# Patient Record
Sex: FEMALE | Race: BLACK OR AFRICAN AMERICAN | NOT HISPANIC OR LATINO | URBAN - METROPOLITAN AREA
[De-identification: names, ages, dates, MRNs, and addresses within clinical notes are randomized per-mention and may not be internally consistent; named-entity substitution may affect disease eponyms.]

---

## 2018-08-27 ENCOUNTER — INPATIENT (INPATIENT)
Facility: HOSPITAL | Age: 43
LOS: 1 days | Discharge: HOME | DRG: 552 | End: 2018-08-29
Attending: HOSPITALIST | Admitting: HOSPITALIST
Payer: COMMERCIAL

## 2018-08-27 VITALS
HEIGHT: 61 IN | OXYGEN SATURATION: 98 % | HEART RATE: 97 BPM | SYSTOLIC BLOOD PRESSURE: 118 MMHG | WEIGHT: 190.04 LBS | TEMPERATURE: 98 F | DIASTOLIC BLOOD PRESSURE: 88 MMHG | RESPIRATION RATE: 22 BRPM

## 2018-08-27 DIAGNOSIS — Z98.89 OTHER SPECIFIED POSTPROCEDURAL STATES: Chronic | ICD-10-CM

## 2018-08-27 LAB
BASOPHILS # BLD AUTO: 0.1 K/UL — SIGNIFICANT CHANGE UP (ref 0–0.2)
BASOPHILS NFR BLD AUTO: 0.7 % — SIGNIFICANT CHANGE UP (ref 0–2)
EOSINOPHIL # BLD AUTO: 0 K/UL — SIGNIFICANT CHANGE UP (ref 0–0.5)
EOSINOPHIL NFR BLD AUTO: 0.6 % — SIGNIFICANT CHANGE UP (ref 0–6)
HCT VFR BLD CALC: 40 % — SIGNIFICANT CHANGE UP (ref 34.5–45)
HGB BLD-MCNC: 13 G/DL — SIGNIFICANT CHANGE UP (ref 11.5–15.5)
LYMPHOCYTES # BLD AUTO: 1.9 K/UL — SIGNIFICANT CHANGE UP (ref 1–3.3)
LYMPHOCYTES # BLD AUTO: 26.5 % — SIGNIFICANT CHANGE UP (ref 13–44)
MCHC RBC-ENTMCNC: 28.3 PG — SIGNIFICANT CHANGE UP (ref 27–34)
MCHC RBC-ENTMCNC: 32.4 GM/DL — SIGNIFICANT CHANGE UP (ref 32–36)
MCV RBC AUTO: 87.5 FL — SIGNIFICANT CHANGE UP (ref 80–100)
MONOCYTES # BLD AUTO: 0.3 K/UL — SIGNIFICANT CHANGE UP (ref 0–0.9)
MONOCYTES NFR BLD AUTO: 3.6 % — SIGNIFICANT CHANGE UP (ref 2–14)
NEUTROPHILS # BLD AUTO: 5 K/UL — SIGNIFICANT CHANGE UP (ref 1.8–7.4)
NEUTROPHILS NFR BLD AUTO: 68.5 % — SIGNIFICANT CHANGE UP (ref 43–77)
PLATELET # BLD AUTO: 382 K/UL — SIGNIFICANT CHANGE UP (ref 150–400)
RBC # BLD: 4.58 M/UL — SIGNIFICANT CHANGE UP (ref 3.8–5.2)
RBC # FLD: 16.1 % — HIGH (ref 10.3–14.5)
WBC # BLD: 7.3 K/UL — SIGNIFICANT CHANGE UP (ref 3.8–10.5)
WBC # FLD AUTO: 7.3 K/UL — SIGNIFICANT CHANGE UP (ref 3.8–10.5)

## 2018-08-27 PROCEDURE — 99285 EMERGENCY DEPT VISIT HI MDM: CPT

## 2018-08-27 RX ORDER — OXYCODONE HYDROCHLORIDE 5 MG/1
5 TABLET ORAL ONCE
Qty: 0 | Refills: 0 | Status: DISCONTINUED | OUTPATIENT
Start: 2018-08-27 | End: 2018-08-27

## 2018-08-27 RX ORDER — ACETAMINOPHEN 500 MG
975 TABLET ORAL ONCE
Qty: 0 | Refills: 0 | Status: COMPLETED | OUTPATIENT
Start: 2018-08-27 | End: 2018-08-27

## 2018-08-27 RX ADMIN — Medication 975 MILLIGRAM(S): at 23:15

## 2018-08-27 RX ADMIN — OXYCODONE HYDROCHLORIDE 5 MILLIGRAM(S): 5 TABLET ORAL at 23:15

## 2018-08-27 NOTE — ED ADULT NURSE NOTE - PMH
Anemia    Ectopic pregnancy  2, both ruptured  Fibromyalgia    GI bleed    Herniated disc, cervical  c6-c7  Joint effusion    Lumbar disc herniation  L4-L5, BULGING DISC AT L5-S1  Lupus    Ovarian cyst rupture    Polyarthralgia

## 2018-08-27 NOTE — ED ADULT TRIAGE NOTE - CHIEF COMPLAINT QUOTE
neck pain s/p ACDF on june 18 at [facility], now with neck pain, taking lyrica/percocet without relief, has L-S fusion pending in oct at [facility]

## 2018-08-27 NOTE — ED ADULT NURSE NOTE - CHPI ED NUR SYMPTOMS NEG
no vomiting/no weakness/no numbness/no change in level of consciousness/no loss of consciousness/no fever/no blurred vision/no confusion

## 2018-08-27 NOTE — ED PROVIDER NOTE - ATTENDING CONTRIBUTION TO CARE
Private Physician Jacinto Mccarthy [facility]   42y female pmh acdf JUNE 2018, Planning LS Spinal fusion 10/2018. PT comes to ed complains of Neck and low back pain, Neck pain had improved since surg, not improving , low back pain past year able to ambulate with pain. Pain meds not sufficent. PE WDWN female awake alert normocephalic atraumatic neck in collar, cv no rmg abd soft neuro gcs 15 speech fluent power lue 4/5 rle 5/5 pain light touch intact moving lwer extr  Ra Richey MD, Facep

## 2018-08-27 NOTE — ED PROVIDER NOTE - PROGRESS NOTE DETAILS
neurosx paged regarding consult asks to be called once CT results   Carito Kim PA-C Endorsed to Dr Daniel Richey MD, Facep CT w/o acute pathology. spine rec appreciated. patient with persistent pain and unable to ambulate will admit for further management Dino Sanchez DO: receive pt from dr gutierrez. pending CT. CT w/o acute pathology. spine sx rec appreciated. patient with persistent pain and unable to ambulate will admit for further management

## 2018-08-27 NOTE — ED PROVIDER NOTE - OBJECTIVE STATEMENT
42 year old female w/ PMHx ACDF sx w/ Dr Macagno [facility] 6/2018 w/ plan for L/S fusion 10/2018 presents co worsened neck pain. Patient states pain was controlled w/ percocet at home after surgery. For the past 3 days patient has had worsened pain, states she was supposed to follow up w/ her surgeon day of onset of pain but was unable to secondary to intensity. States she has intermittently been wearing her c-collar as advised. Denies recurrent trauma, falls. Patient endorses continued b/l hand numbness unchanged since prior surgery. She is able to ambulate w/ some difficulty. Denies fevers, chills, chest pain, sob, headaches, dizziness, abdominal pain, n/v, saddle anesthesia, bowl and bladder retention/incontinence

## 2018-08-27 NOTE — ED ADULT NURSE NOTE - CHIEF COMPLAINT QUOTE
neck pain s/p ACDF on june 18 at [facility], now with neck pain, taking lyrica/percocet without relief, has L-S fusion pending in oct at [facility], +ccollar in place

## 2018-08-27 NOTE — ED ADULT NURSE NOTE - OBJECTIVE STATEMENT
Pt wear hard c-collar presents for eval of continued low back pain for which she is scheduled to have surgery next week.  She is ambulatory without assistance.   Denies urinary or bowel incontinence.  No loss of sensation lower extremities.

## 2018-08-27 NOTE — ED ADULT NURSE NOTE - PSH
Complication following molar or ectopic pregnancy  bilateral salpingectomy  H/O abdominoplasty    H/O gastric bypass  June 2009  Internal hernia  s/p repair - 2011

## 2018-08-27 NOTE — ED PROVIDER NOTE - PHYSICAL EXAMINATION
CONSTITUTIONAL: Patient is awake, alert and oriented x 3. Patient is well appearing and in no acute distress.  HEAD: NCAT,   EYES: PERRL b/l, EOMI,   ENT: Airway patent, Nasal mucosa clear. Mouth with normal mucosa. Throat has no vesicles, no oropharyngeal exudates and uvula is midline.  LUNGS: CTA B/L,  HEART: RRR.+S1S2 no murmurs,   ABDOMEN: Soft nd/nt+bs no rebound or guarding.   EXTREMITY: no edema or calf tenderness b/l, FROM upper and lower ext b/l. Patient in c-collar + midline cervical ttp and midline lumbar ttp and left pumbar paraspinal ttp. No midline thoracic ttp  SKIN: with no rash or lesions.   NEURO: CN 3-12 grossly intact. Normal gross sensation upper and lower extremities b/l.  strength 4/5 b/l UE otherwise 5/5, strength of the RLE 5/5 LLE 4/5.

## 2018-08-28 DIAGNOSIS — R73.9 HYPERGLYCEMIA, UNSPECIFIED: ICD-10-CM

## 2018-08-28 DIAGNOSIS — M50.20 OTHER CERVICAL DISC DISPLACEMENT, UNSPECIFIED CERVICAL REGION: ICD-10-CM

## 2018-08-28 DIAGNOSIS — M51.26 OTHER INTERVERTEBRAL DISC DISPLACEMENT, LUMBAR REGION: ICD-10-CM

## 2018-08-28 DIAGNOSIS — M79.7 FIBROMYALGIA: ICD-10-CM

## 2018-08-28 DIAGNOSIS — R52 PAIN, UNSPECIFIED: ICD-10-CM

## 2018-08-28 LAB
ALBUMIN SERPL ELPH-MCNC: 4.1 G/DL — SIGNIFICANT CHANGE UP (ref 3.3–5)
ALBUMIN SERPL ELPH-MCNC: 4.5 G/DL — SIGNIFICANT CHANGE UP (ref 3.3–5)
ALP SERPL-CCNC: 120 U/L — SIGNIFICANT CHANGE UP (ref 40–120)
ALP SERPL-CCNC: 130 U/L — HIGH (ref 40–120)
ALT FLD-CCNC: 27 U/L — SIGNIFICANT CHANGE UP (ref 10–45)
ALT FLD-CCNC: 29 U/L — SIGNIFICANT CHANGE UP (ref 10–45)
ANION GAP SERPL CALC-SCNC: 18 MMOL/L — HIGH (ref 5–17)
ANION GAP SERPL CALC-SCNC: 20 MMOL/L — HIGH (ref 5–17)
AST SERPL-CCNC: 40 U/L — SIGNIFICANT CHANGE UP (ref 10–40)
AST SERPL-CCNC: 55 U/L — HIGH (ref 10–40)
BILIRUB SERPL-MCNC: 0.5 MG/DL — SIGNIFICANT CHANGE UP (ref 0.2–1.2)
BILIRUB SERPL-MCNC: 0.7 MG/DL — SIGNIFICANT CHANGE UP (ref 0.2–1.2)
BUN SERPL-MCNC: 10 MG/DL — SIGNIFICANT CHANGE UP (ref 7–23)
BUN SERPL-MCNC: 9 MG/DL — SIGNIFICANT CHANGE UP (ref 7–23)
CALCIUM SERPL-MCNC: 9 MG/DL — SIGNIFICANT CHANGE UP (ref 8.4–10.5)
CALCIUM SERPL-MCNC: 9.5 MG/DL — SIGNIFICANT CHANGE UP (ref 8.4–10.5)
CHLORIDE SERPL-SCNC: 94 MMOL/L — LOW (ref 96–108)
CHLORIDE SERPL-SCNC: 96 MMOL/L — SIGNIFICANT CHANGE UP (ref 96–108)
CO2 SERPL-SCNC: 22 MMOL/L — SIGNIFICANT CHANGE UP (ref 22–31)
CO2 SERPL-SCNC: 23 MMOL/L — SIGNIFICANT CHANGE UP (ref 22–31)
CREAT SERPL-MCNC: 0.55 MG/DL — SIGNIFICANT CHANGE UP (ref 0.5–1.3)
CREAT SERPL-MCNC: 0.61 MG/DL — SIGNIFICANT CHANGE UP (ref 0.5–1.3)
GLUCOSE SERPL-MCNC: 132 MG/DL — HIGH (ref 70–99)
GLUCOSE SERPL-MCNC: 143 MG/DL — HIGH (ref 70–99)
HCG SERPL-ACNC: <2 MIU/ML — SIGNIFICANT CHANGE UP
HCT VFR BLD CALC: 39.3 % — SIGNIFICANT CHANGE UP (ref 34.5–45)
HGB BLD-MCNC: 12.8 G/DL — SIGNIFICANT CHANGE UP (ref 11.5–15.5)
MCHC RBC-ENTMCNC: 28 PG — SIGNIFICANT CHANGE UP (ref 27–34)
MCHC RBC-ENTMCNC: 32.6 GM/DL — SIGNIFICANT CHANGE UP (ref 32–36)
MCV RBC AUTO: 86 FL — SIGNIFICANT CHANGE UP (ref 80–100)
PLATELET # BLD AUTO: 389 K/UL — SIGNIFICANT CHANGE UP (ref 150–400)
POTASSIUM SERPL-MCNC: 3.9 MMOL/L — SIGNIFICANT CHANGE UP (ref 3.5–5.3)
POTASSIUM SERPL-MCNC: 5.4 MMOL/L — HIGH (ref 3.5–5.3)
POTASSIUM SERPL-SCNC: 3.9 MMOL/L — SIGNIFICANT CHANGE UP (ref 3.5–5.3)
POTASSIUM SERPL-SCNC: 5.4 MMOL/L — HIGH (ref 3.5–5.3)
PROT SERPL-MCNC: 8.5 G/DL — HIGH (ref 6–8.3)
PROT SERPL-MCNC: 8.7 G/DL — HIGH (ref 6–8.3)
RBC # BLD: 4.57 M/UL — SIGNIFICANT CHANGE UP (ref 3.8–5.2)
RBC # FLD: 16.3 % — HIGH (ref 10.3–14.5)
SODIUM SERPL-SCNC: 136 MMOL/L — SIGNIFICANT CHANGE UP (ref 135–145)
SODIUM SERPL-SCNC: 137 MMOL/L — SIGNIFICANT CHANGE UP (ref 135–145)
WBC # BLD: 8.96 K/UL — SIGNIFICANT CHANGE UP (ref 3.8–10.5)
WBC # FLD AUTO: 8.96 K/UL — SIGNIFICANT CHANGE UP (ref 3.8–10.5)

## 2018-08-28 PROCEDURE — 72125 CT NECK SPINE W/O DYE: CPT | Mod: 26

## 2018-08-28 PROCEDURE — 72131 CT LUMBAR SPINE W/O DYE: CPT | Mod: 26

## 2018-08-28 PROCEDURE — 99255 IP/OBS CONSLTJ NEW/EST HI 80: CPT

## 2018-08-28 PROCEDURE — 99223 1ST HOSP IP/OBS HIGH 75: CPT | Mod: AI

## 2018-08-28 RX ORDER — ONDANSETRON 8 MG/1
4 TABLET, FILM COATED ORAL ONCE
Qty: 0 | Refills: 0 | Status: COMPLETED | OUTPATIENT
Start: 2018-08-28 | End: 2018-08-28

## 2018-08-28 RX ORDER — ACETAMINOPHEN 500 MG
650 TABLET ORAL EVERY 6 HOURS
Qty: 0 | Refills: 0 | Status: DISCONTINUED | OUTPATIENT
Start: 2018-08-28 | End: 2018-08-29

## 2018-08-28 RX ORDER — OXYCODONE HYDROCHLORIDE 5 MG/1
5 TABLET ORAL ONCE
Qty: 0 | Refills: 0 | Status: DISCONTINUED | OUTPATIENT
Start: 2018-08-28 | End: 2018-08-28

## 2018-08-28 RX ORDER — MORPHINE SULFATE 50 MG/1
2 CAPSULE, EXTENDED RELEASE ORAL ONCE
Qty: 0 | Refills: 0 | Status: DISCONTINUED | OUTPATIENT
Start: 2018-08-28 | End: 2018-08-28

## 2018-08-28 RX ORDER — DIAZEPAM 5 MG
5 TABLET ORAL ONCE
Qty: 0 | Refills: 0 | Status: DISCONTINUED | OUTPATIENT
Start: 2018-08-28 | End: 2018-08-28

## 2018-08-28 RX ORDER — MORPHINE SULFATE 50 MG/1
4 CAPSULE, EXTENDED RELEASE ORAL EVERY 4 HOURS
Qty: 0 | Refills: 0 | Status: DISCONTINUED | OUTPATIENT
Start: 2018-08-28 | End: 2018-08-29

## 2018-08-28 RX ORDER — ENOXAPARIN SODIUM 100 MG/ML
40 INJECTION SUBCUTANEOUS EVERY 24 HOURS
Qty: 0 | Refills: 0 | Status: DISCONTINUED | OUTPATIENT
Start: 2018-08-28 | End: 2018-08-29

## 2018-08-28 RX ORDER — ONDANSETRON 8 MG/1
4 TABLET, FILM COATED ORAL EVERY 6 HOURS
Qty: 0 | Refills: 0 | Status: DISCONTINUED | OUTPATIENT
Start: 2018-08-28 | End: 2018-08-29

## 2018-08-28 RX ORDER — MORPHINE SULFATE 50 MG/1
4 CAPSULE, EXTENDED RELEASE ORAL ONCE
Qty: 0 | Refills: 0 | Status: DISCONTINUED | OUTPATIENT
Start: 2018-08-28 | End: 2018-08-28

## 2018-08-28 RX ORDER — CYCLOBENZAPRINE HYDROCHLORIDE 10 MG/1
5 TABLET, FILM COATED ORAL THREE TIMES A DAY
Qty: 0 | Refills: 0 | Status: DISCONTINUED | OUTPATIENT
Start: 2018-08-28 | End: 2018-08-29

## 2018-08-28 RX ORDER — OXYCODONE HYDROCHLORIDE 5 MG/1
10 TABLET ORAL EVERY 6 HOURS
Qty: 0 | Refills: 0 | Status: DISCONTINUED | OUTPATIENT
Start: 2018-08-28 | End: 2018-08-29

## 2018-08-28 RX ADMIN — Medication 5 MILLIGRAM(S): at 04:14

## 2018-08-28 RX ADMIN — OXYCODONE HYDROCHLORIDE 5 MILLIGRAM(S): 5 TABLET ORAL at 03:15

## 2018-08-28 RX ADMIN — MORPHINE SULFATE 4 MILLIGRAM(S): 50 CAPSULE, EXTENDED RELEASE ORAL at 05:35

## 2018-08-28 RX ADMIN — OXYCODONE HYDROCHLORIDE 10 MILLIGRAM(S): 5 TABLET ORAL at 14:11

## 2018-08-28 RX ADMIN — MORPHINE SULFATE 2 MILLIGRAM(S): 50 CAPSULE, EXTENDED RELEASE ORAL at 10:19

## 2018-08-28 RX ADMIN — ONDANSETRON 4 MILLIGRAM(S): 8 TABLET, FILM COATED ORAL at 14:11

## 2018-08-28 RX ADMIN — ONDANSETRON 4 MILLIGRAM(S): 8 TABLET, FILM COATED ORAL at 18:39

## 2018-08-28 RX ADMIN — ONDANSETRON 4 MILLIGRAM(S): 8 TABLET, FILM COATED ORAL at 10:19

## 2018-08-28 RX ADMIN — OXYCODONE HYDROCHLORIDE 10 MILLIGRAM(S): 5 TABLET ORAL at 15:17

## 2018-08-28 RX ADMIN — OXYCODONE HYDROCHLORIDE 5 MILLIGRAM(S): 5 TABLET ORAL at 02:11

## 2018-08-28 RX ADMIN — OXYCODONE HYDROCHLORIDE 10 MILLIGRAM(S): 5 TABLET ORAL at 21:33

## 2018-08-28 RX ADMIN — OXYCODONE HYDROCHLORIDE 10 MILLIGRAM(S): 5 TABLET ORAL at 21:03

## 2018-08-28 RX ADMIN — ENOXAPARIN SODIUM 40 MILLIGRAM(S): 100 INJECTION SUBCUTANEOUS at 14:11

## 2018-08-28 RX ADMIN — CYCLOBENZAPRINE HYDROCHLORIDE 5 MILLIGRAM(S): 10 TABLET, FILM COATED ORAL at 14:22

## 2018-08-28 RX ADMIN — ONDANSETRON 4 MILLIGRAM(S): 8 TABLET, FILM COATED ORAL at 05:35

## 2018-08-28 RX ADMIN — ONDANSETRON 4 MILLIGRAM(S): 8 TABLET, FILM COATED ORAL at 07:18

## 2018-08-28 RX ADMIN — MORPHINE SULFATE 2 MILLIGRAM(S): 50 CAPSULE, EXTENDED RELEASE ORAL at 10:38

## 2018-08-28 RX ADMIN — Medication 975 MILLIGRAM(S): at 03:15

## 2018-08-28 RX ADMIN — MORPHINE SULFATE 4 MILLIGRAM(S): 50 CAPSULE, EXTENDED RELEASE ORAL at 06:35

## 2018-08-28 NOTE — H&P ADULT - PROBLEM SELECTOR PLAN 2
-plan for L/S fusion 10/2018 at [facility]  -pain control as above   -PT   -f/up with outpatient ortho spine

## 2018-08-28 NOTE — CONSULT NOTE ADULT - ASSESSMENT
42F h/o herniated lumbar and cervical discs s/p C4/6 acdf in 6/2018 with persistent pain post operatively, no new weakness / numbness or other neurological complaints. Patient diffusely weak on exam 2/2 effort and pain however no focal deficit. Patient HERNANDEZ antigravity with only break away weakness 2/2 pain. CT C and L spine no acute fracture hardware in good place.  - No acute neurosurgical intervention  - Pain control  - Would recommend patient follow up with her surgeon as outpatient

## 2018-08-28 NOTE — CONSULT NOTE ADULT - SUBJECTIVE AND OBJECTIVE BOX
p ([id number])     HPI: 42 year old female w/ PMHx ACDF sx w/ Dr Macagno [facility] 6/2018 w/ plan for L/S fusion 10/2018 presents co worsened neck pain. Patient states pain was controlled w/ percocet at home after surgery. For the past 3 days patient has had worsened pain, states she was supposed to follow up w/ her surgeon day of onset of pain but was unable to secondary to intensity. States she has intermittently been wearing her c-collar as advised for comfort. Denies recurrent trauma, falls. Patient endorses continued b/l hand numbness/parethesias unchanged since prior surgery. She is able to ambulate w/ some difficulty. Denies fevers, chills, chest pain, sob, headaches, dizziness, abdominal pain, n/v, saddle anesthesia, bowl and bladder retention/incontinence. Patient denies any new weakness / numbness. Patient only complains of continued pain which is at her baseline since prior the operation. Patient states she has not had improvement in symptoms after the surgery    PAST MEDICAL HISTORY   Lupus  Anemia  GI bleed  Joint effusion  Herniated disc, cervical  Lumbar disc herniation  Polyarthralgia  Fibromyalgia  Ectopic pregnancy  Ovarian cyst rupture  No pertinent past medical history    PAST SURGICAL HISTORY   H/O abdominoplasty  Internal hernia  Complication following molar or ectopic pregnancy  H/O gastric bypass    IV Contrast (Hives)  Seafood (Hives)      MEDICATIONS:  Antibiotics:    Neuro:    Anticoagulation:    Other:      SOCIAL HISTORY:   Occupation:   Marital Status:     FAMILY HISTORY:  No pertinent family history in first degree relatives      REVIEW OF SYSTEMS:  Check here if all are normal other than Neurological []  General:  Eyes:  ENT:  Cardiac:  Respiratory:  GI:  Musculoskeletal:   Skin:  Neurologic:   Psychiatric:     PHYSICAL EXAMINATION:   T(C): 36.8 (08-28-18 @ 01:50), Max: 36.8 (08-28-18 @ 01:50)  HR: 88 (08-28-18 @ 01:50) (88 - 97)  BP: 127/84 (08-28-18 @ 01:50) (118/88 - 127/84)  RR: 21 (08-28-18 @ 01:50) (21 - 22)  SpO2: 100% (08-28-18 @ 01:50) (98% - 100%)  Wt(kg): --Height (cm): 154.94 (08-27 @ 21:44)  Weight (kg): 86.2 (08-27 @ 21:44)    General Examination:     Neurologic Examination:           PHYSICAL EXAM:    Constitutional: No Acute Distress     Neurological: AOx3, Following Commands    Motor exam:          Upper extremity                         Delt     Bicep     Tricep    HG                                                 R         4/5        4/5        4/5       4/5                                               L          4/5        4/5        4/5       4/5          Lower extremity                        HF         KF        KE       DF         PF                                                  R        4/5       4/5        4/5     4/5         4/5                                               L         4/5        4/5      4/5       4/5       4/5    Patient exam limited to pain and effort                                             Sensation: [x] intact to light touch  [] decreased:     No clonus, no hoffmans, no babinski    Incision: CDI   LABS:                        13.0   7.3   )-----------( 382      ( 27 Aug 2018 23:43 )             40.0     08-27    136  |  96  |  10  ----------------------------<  143<H>  5.4<H>   |  22  |  0.55    Ca    9.0      27 Aug 2018 23:43    TPro  8.5<H>  /  Alb  4.1  /  TBili  0.5  /  DBili  x   /  AST  55<H>  /  ALT  29  /  AlkPhos  120  08-27          RADIOLOGY & ADDITIONAL STUDIES:  IMPRESSION:     Central disc protrusions at L4-L5 and L5-S1 causing mild spinal canal   stenosis, better assessed with MRI.     Nonspecific fluid and stranding in the midline subcutaneous fat at the   L1-L3 level; correlate for recent intervention.                MARIA ALEJANDRA DANIELLE M.D., RADIOLOGY RESIDENT  This document has been electronically signed.  NAZIA MESSINA M.D., ATTENDING RADIOLOGIST  This document has been electronically signed. Aug 28 2018  1:49AM      IMPRESSION:    No evidence for acute displaced fracture or traumatic malalignment.   Patient is status post anterior cervical discectomy and fusion of C4-C6.   The hardware appears intact.    If symptoms persist, correlate with neurologic evaluation to determine if   further evaluation with MRI is warranted, if there are no   contraindications.                MARIA ALEJANDAR DANIELLE M.D., RADIOLOGY RESIDENT  This document has been electronically signed.  NAZIA MESSINA M.D., ATTENDING RADIOLOGIST  This document has been electronically signed. Aug 28 2018  1:42AM

## 2018-08-28 NOTE — H&P ADULT - HISTORY OF PRESENT ILLNESS
42F h/o fibromyalgia, herniated lumbar and cervical discs s/p C4/6 ACDF in 6/2018 w/ plan for L/S fusion 10/2018, presents c/o worsened neck pain. Pt says neck pain had improved after surgery, but then has been progressively worsening over the past 3 weeks. Pain is localized to back of neck, radiates down left arm, sharp pain, 8 to 10 out of 10 on pain scale, constant, worse w/ movement, Percocet helps. Pain associated w/ bilateral hand numbness and tingling. Pt also w/ lower back pain that has been controlled w/ Percocet.

## 2018-08-28 NOTE — PHYSICAL THERAPY INITIAL EVALUATION ADULT - PRECAUTIONS/LIMITATIONS, REHAB EVAL
spinal precautions/RESULTS CONTINUED: CT cervical spine unremarkable. CT Lumbar spine: central disc protrusions at L4-L5 and L5-S1 causing mild spinal canal stenosis; nonspecific fluid and stranding in the midline subcutaneous fat at the L-L3 level.

## 2018-08-28 NOTE — H&P ADULT - [facility] PE MLT D E PC
normal (ped)... In no apparent distress, appears well developed and well nourished. regular rate and rhythm

## 2018-08-28 NOTE — PHYSICAL THERAPY INITIAL EVALUATION ADULT - PLANNED THERAPY INTERVENTIONS, PT EVAL
Pt currently demonstrates independence with all functional mobility. Pt to be placed on ambulation aide program to maintain independence and to encourage further ambulation/activity. Pt cleared fo discharge from a PT standpoint. Please reconsult as appropriate/if status changes. Pt currently demonstrates independence with all functional mobility. Pt to be placed on ambulation aide program to maintain independence and to encourage further ambulation/activity. Pt cleared for discharge from a PT standpoint. Please reconsult as appropriate/if status changes.

## 2018-08-28 NOTE — H&P ADULT - PROBLEM SELECTOR PLAN 1
-s/p C4-6 ACDF in 6/2018   -CT C-spine unremarkable   -pain control w/ Oxycodone and Morphine IV prn   -Flexeril prn muscle spasms   -c-collar for comfort   -PT   -neurosurgery input appreciated

## 2018-08-28 NOTE — H&P ADULT - ASSESSMENT
42F h/o fibromyalgia, herniated lumbar and cervical discs s/p C4/6 ACDF in 6/2018 w/ plan for L/S fusion 10/2018, presents c/o worsened neck pain.

## 2018-08-28 NOTE — H&P ADULT - NSHPPHYSICALEXAM_GEN_ALL_CORE
Vital Signs Last 24 Hrs  T(C): 36.7 (28 Aug 2018 08:00), Max: 36.9 (28 Aug 2018 05:31)  T(F): 98 (28 Aug 2018 08:00), Max: 98.5 (28 Aug 2018 05:31)  HR: 79 (28 Aug 2018 08:00) (79 - 97)  BP: 165/83 (28 Aug 2018 08:00) (118/88 - 165/83)  BP(mean): --  RR: 18 (28 Aug 2018 08:00) (18 - 22)  SpO2: 99% (28 Aug 2018 08:00) (98% - 100%)

## 2018-08-28 NOTE — PHYSICAL THERAPY INITIAL EVALUATION ADULT - PERTINENT HX OF CURRENT PROBLEM, REHAB EVAL
41 y/o female with h/o fibromyalgia, herniated lumbar and cervical discs s/p C4-C6 ACDF (6/2018) with plan for L/S fusion 10/2018. Pt p/w c/o neck pain post operatively. Pt says neck pain had improved after surgery, but then has been progressively worsening over the past 3 weeks.  Pain associated with bilateral hand numbness and tingling. Pt states she has intermittently been wearing her c-collar as advised for comfort. Pt also with lower back pain that has been controlled with Percocet

## 2018-08-28 NOTE — CONSULT NOTE ADULT - ATTENDING COMMENTS
Pt seen and examined  Agree with above  Will try and contact her Spine surgeon at [facility] and arrange for transfer at the pts request

## 2018-08-28 NOTE — H&P ADULT - NSHPLABSRESULTS_GEN_ALL_CORE
12.8   8.96  )-----------( 389      ( 28 Aug 2018 07:25 )             39.3   08-28    137  |  94<L>  |  9   ----------------------------<  132<H>  3.9   |  23  |  0.61    Ca    9.5      28 Aug 2018 06:10    TPro  8.7<H>  /  Alb  4.5  /  TBili  0.7  /  DBili  x   /  AST  40  /  ALT  27  /  AlkPhos  130<H>  08-28    CT C-spine:   No evidence for acute displaced fracture or traumatic malalignment.   Patient is status post anterior cervical discectomy and fusion of C4-C6.   The hardware appears intact.    CT L-spine:   Central disc protrusions at L4-L5 and L5-S1 causing mild spinal canal   stenosis, better assessed with MRI.   Nonspecific fluid and stranding in the midline subcutaneous fat at the   L1-L3 level; correlate for recent intervention.

## 2018-08-28 NOTE — PHYSICAL THERAPY INITIAL EVALUATION ADULT - GENERAL OBSERVATIONS, REHAB EVAL
Pt ayesha 35 min eval well. Pt agreed to session. Pt a/w c/o neck and back pain (planned for sx 10/2018). Pt initially rec'd in bed, premedicated, c-collar off. When PT returned pt rec'd standing in room independently with c-collar on.

## 2018-08-29 ENCOUNTER — TRANSCRIPTION ENCOUNTER (OUTPATIENT)
Age: 43
End: 2018-08-29

## 2018-08-29 VITALS
HEART RATE: 100 BPM | RESPIRATION RATE: 18 BRPM | OXYGEN SATURATION: 98 % | TEMPERATURE: 99 F | SYSTOLIC BLOOD PRESSURE: 137 MMHG | DIASTOLIC BLOOD PRESSURE: 92 MMHG

## 2018-08-29 LAB
ALBUMIN SERPL ELPH-MCNC: 4.4 G/DL — SIGNIFICANT CHANGE UP (ref 3.3–5)
ALP SERPL-CCNC: 126 U/L — HIGH (ref 40–120)
ALT FLD-CCNC: 19 U/L — SIGNIFICANT CHANGE UP (ref 10–45)
ANION GAP SERPL CALC-SCNC: 13 MMOL/L — SIGNIFICANT CHANGE UP (ref 5–17)
AST SERPL-CCNC: 29 U/L — SIGNIFICANT CHANGE UP (ref 10–40)
BILIRUB SERPL-MCNC: 0.7 MG/DL — SIGNIFICANT CHANGE UP (ref 0.2–1.2)
BUN SERPL-MCNC: 7 MG/DL — SIGNIFICANT CHANGE UP (ref 7–23)
CALCIUM SERPL-MCNC: 9.6 MG/DL — SIGNIFICANT CHANGE UP (ref 8.4–10.5)
CHLORIDE SERPL-SCNC: 97 MMOL/L — SIGNIFICANT CHANGE UP (ref 96–108)
CO2 SERPL-SCNC: 24 MMOL/L — SIGNIFICANT CHANGE UP (ref 22–31)
CREAT SERPL-MCNC: 0.71 MG/DL — SIGNIFICANT CHANGE UP (ref 0.5–1.3)
GLUCOSE SERPL-MCNC: 141 MG/DL — HIGH (ref 70–99)
POTASSIUM SERPL-MCNC: 3.6 MMOL/L — SIGNIFICANT CHANGE UP (ref 3.5–5.3)
POTASSIUM SERPL-SCNC: 3.6 MMOL/L — SIGNIFICANT CHANGE UP (ref 3.5–5.3)
PROT SERPL-MCNC: 8.1 G/DL — SIGNIFICANT CHANGE UP (ref 6–8.3)
SODIUM SERPL-SCNC: 134 MMOL/L — LOW (ref 135–145)

## 2018-08-29 PROCEDURE — 72125 CT NECK SPINE W/O DYE: CPT

## 2018-08-29 PROCEDURE — 83036 HEMOGLOBIN GLYCOSYLATED A1C: CPT

## 2018-08-29 PROCEDURE — G0378: CPT

## 2018-08-29 PROCEDURE — 80053 COMPREHEN METABOLIC PANEL: CPT

## 2018-08-29 PROCEDURE — 97161 PT EVAL LOW COMPLEX 20 MIN: CPT

## 2018-08-29 PROCEDURE — 99285 EMERGENCY DEPT VISIT HI MDM: CPT

## 2018-08-29 PROCEDURE — 84702 CHORIONIC GONADOTROPIN TEST: CPT

## 2018-08-29 PROCEDURE — 85027 COMPLETE CBC AUTOMATED: CPT

## 2018-08-29 PROCEDURE — 72131 CT LUMBAR SPINE W/O DYE: CPT

## 2018-08-29 RX ORDER — OXYCODONE AND ACETAMINOPHEN 5; 325 MG/1; MG/1
2 TABLET ORAL EVERY 6 HOURS
Qty: 0 | Refills: 0 | Status: DISCONTINUED | OUTPATIENT
Start: 2018-08-29 | End: 2018-08-29

## 2018-08-29 RX ORDER — CYCLOBENZAPRINE HYDROCHLORIDE 10 MG/1
0 TABLET, FILM COATED ORAL
Qty: 0 | Refills: 0 | COMMUNITY

## 2018-08-29 RX ADMIN — ONDANSETRON 4 MILLIGRAM(S): 8 TABLET, FILM COATED ORAL at 08:33

## 2018-08-29 RX ADMIN — OXYCODONE AND ACETAMINOPHEN 2 TABLET(S): 5; 325 TABLET ORAL at 13:00

## 2018-08-29 RX ADMIN — Medication 150 MILLIGRAM(S): at 15:36

## 2018-08-29 RX ADMIN — OXYCODONE HYDROCHLORIDE 10 MILLIGRAM(S): 5 TABLET ORAL at 04:13

## 2018-08-29 RX ADMIN — OXYCODONE HYDROCHLORIDE 10 MILLIGRAM(S): 5 TABLET ORAL at 04:43

## 2018-08-29 RX ADMIN — CYCLOBENZAPRINE HYDROCHLORIDE 5 MILLIGRAM(S): 10 TABLET, FILM COATED ORAL at 11:29

## 2018-08-29 RX ADMIN — ONDANSETRON 4 MILLIGRAM(S): 8 TABLET, FILM COATED ORAL at 02:24

## 2018-08-29 RX ADMIN — OXYCODONE AND ACETAMINOPHEN 2 TABLET(S): 5; 325 TABLET ORAL at 12:39

## 2018-08-29 RX ADMIN — ENOXAPARIN SODIUM 40 MILLIGRAM(S): 100 INJECTION SUBCUTANEOUS at 15:36

## 2018-08-29 NOTE — PROGRESS NOTE ADULT - ATTENDING COMMENTS
zofran for nausea, improved.  suspect single episode of diarrhea was from food vs. valium. Improved today. Afebrile w/ no leukocytosis, HD stable.  Patient eager for d/c home, will not give opioids on d/c. Can give 5 days lyrica until she's able to get refill from outside provider.  d/w neurosurgery, no contraindication to discharge w/ close outpatient f/u.  40 minutes spent coordinating discharge, see d/c sum for details.

## 2018-08-29 NOTE — PROGRESS NOTE ADULT - ASSESSMENT
42F h/o fibromyalgia, herniated lumbar and cervical discs s/p C4/6 ACDF in 6/2018 w/ plan for L/S fusion 10/2018, presents c/o worsened neck pain in setting of missing recent refill of chronic opioids.

## 2018-08-29 NOTE — DISCHARGE NOTE ADULT - PLAN OF CARE
achieve acceptable pain level s/p C4-6 ACDF in 6/2018   -CT C-spine unremarkable   pain control with percocet 10mg q6 (ISTOP [id number]).   Flexeril 5mg TID, home dose.   Lyrica 150 TID, home dose.   c-collar for comfort   Out-patient physical therapy   neurosurgery input appreciated, no acute interventions.  Lumbar disc herniation.  Plan: -plan for L/S fusion 10/2018 at [facility] Continue current medication regimen Neck and back pain 2/2 cervical and lumbar disc herniation   s/p C4-6 ACDF in 6/2018   -CT C-spine unremarkable   pain control with percocet 10mg q6 (ISTOP [id number]).   Flexeril 5mg TID, home dose.   Lyrica 150 TID, home dose.   c-collar for comfort   Out-patient physical therapy   neurosurgery input appreciated, no acute interventions.  Lumbar disc herniation-plan for L/S fusion 10/2018 at [facility]

## 2018-08-29 NOTE — DISCHARGE NOTE ADULT - ADDITIONAL INSTRUCTIONS
Please follow up with Orthopedic surgeon, Dr. Meza within 1 week of discharge   Call to make appointment, bring discharge paperwork to follow up visit

## 2018-08-29 NOTE — DISCHARGE NOTE ADULT - CARE PLAN
Principal Discharge DX:	Intractable pain  Goal:	achieve acceptable pain level  Assessment and plan of treatment:	s/p C4-6 ACDF in 6/2018   -CT C-spine unremarkable   pain control with percocet 10mg q6 (ISTOP [id number]).   Flexeril 5mg TID, home dose.   Lyrica 150 TID, home dose.   c-collar for comfort   Out-patient physical therapy   neurosurgery input appreciated, no acute interventions.  Lumbar disc herniation.  Plan: -plan for L/S fusion 10/2018 at [facility]  Secondary Diagnosis:	Fibromyalgia  Assessment and plan of treatment:	Continue current medication regimen Principal Discharge DX:	Intractable pain  Goal:	achieve acceptable pain level  Assessment and plan of treatment:	Neck and back pain 2/2 cervical and lumbar disc herniation   s/p C4-6 ACDF in 6/2018   -CT C-spine unremarkable   pain control with percocet 10mg q6 (ISTOP [id number]).   Flexeril 5mg TID, home dose.   Lyrica 150 TID, home dose.   c-collar for comfort   Out-patient physical therapy   neurosurgery input appreciated, no acute interventions.  Lumbar disc herniation-plan for L/S fusion 10/2018 at [facility]  Secondary Diagnosis:	Fibromyalgia  Assessment and plan of treatment:	Continue current medication regimen

## 2018-08-29 NOTE — PROGRESS NOTE ADULT - PROBLEM SELECTOR PLAN 1
-s/p C4-6 ACDF in 6/2018   -CT C-spine unremarkable   -pain control w/ Oxycodone and Morphine IV prn   -Flexeril prn muscle spasms   -c-collar for comfort   -PT   -neurosurgery input appreciated - acute pain 2/2 patient missing refill of opioids. Now feels no pains. No new neurologic symptoms.   - re-start home regimen as below.   -s/p C4-6 ACDF in 6/2018   -CT C-spine unremarkable   -pain control w/ home dose perc 10 q6 (ISTOP [id number]).   -Flexeril 5mg TID, home dose.   - lyrica 150 TID, home dose.   -c-collar for comfort   -PT   -neurosurgery input appreciated, no acute interventions.

## 2018-08-29 NOTE — DISCHARGE NOTE ADULT - MEDICATION SUMMARY - MEDICATIONS TO CHANGE
I will SWITCH the dose or number of times a day I take the medications listed below when I get home from the hospital:    Lyrica 150 mg oral capsule  -- 1 cap(s) by mouth 2 times a day

## 2018-08-29 NOTE — DISCHARGE NOTE ADULT - MEDICATION SUMMARY - MEDICATIONS TO TAKE
I will START or STAY ON the medications listed below when I get home from the hospital:    Rolling walker  -- Indication: For ambulation    Physical Therapy  -- Indication: For Intractable pain    Percocet 10/325 oral tablet  -- 1 tab(s) by mouth every 6 hours, As Needed  -- Indication: For Pain    pregabalin 150 mg oral capsule  -- 1 cap(s) by mouth every 8 hours MDD:3  -- Indication: For Fibromyalgia    cyclobenzaprine 5 mg oral tablet  -- 1 tab(s) by mouth 3 times a day, As needed, Muscle Spasm  -- Indication: For Pain    Multiple Vitamins oral tablet  -- 1 tab(s) by mouth once a day  -- Indication: For supplement

## 2018-08-29 NOTE — PROGRESS NOTE ADULT - SUBJECTIVE AND OBJECTIVE BOX
cc: acute on chronic pain.     Yesterday after food + valium she developed watery diarrhea x1. None since.  Overnight VSS, afebrile. Improved pains this AM. Still w/ mild nausea.     REVIEW OF SYSTEMS:  CONSTITUTIONAL: No fever, weight loss, +fatigue.   EYES: No eye pain, visual disturbances, or discharge  ENMT:  No difficulty hearing, tinnitus, vertigo; No sinus or throat pain  NECK: +chronic neck pain, wears brace.   RESPIRATORY: No cough, wheezing, chills or hemoptysis; No shortness of breath  CARDIOVASCULAR: No chest pain, palpitations, dizziness, or leg swelling  GASTROINTESTINAL: +mild abdominal discomfort, mild nausea, no vomiting or hematemesis; No diarrhea (resolved) or constipation. No melena or hematochezia.  GENITOURINARY: No dysuria, frequency, hematuria, or incontinence  NEUROLOGICAL: No headaches, memory loss, +chronic loss of strength (LUE), no numbness, or tremors  SKIN: No itching, burning, rashes, or lesions   ENDOCRINE: No heat or cold intolerance; No hair loss  MUSCULOSKELETAL: pains as above.   HEME/LYMPH: No easy bruising, or bleeding gums    T(C): 37 (08-29-18 @ 08:37), Max: 37.2 (08-29-18 @ 00:58)  HR: 90 (08-29-18 @ 08:37) (88 - 112)  BP: 130/87 (08-29-18 @ 08:37) (130/87 - 147/89)  RR: 18 (08-29-18 @ 08:37) (18 - 18)  SpO2: 95% (08-29-18 @ 08:37) (95% - 99%)  Wt(kg): --Vital Signs Last 24 Hrs  T(C): 37 (29 Aug 2018 08:37), Max: 37.2 (29 Aug 2018 00:58)  T(F): 98.6 (29 Aug 2018 08:37), Max: 98.9 (29 Aug 2018 00:58)  HR: 90 (29 Aug 2018 08:37) (88 - 112)  BP: 130/87 (29 Aug 2018 08:37) (130/87 - 147/89)  BP(mean): --  RR: 18 (29 Aug 2018 08:37) (18 - 18)  SpO2: 95% (29 Aug 2018 08:37) (95% - 99%)    PHYSICAL EXAM:  GENERAL: NAD, well-groomed. Wearing collar around neck.   HEAD:  Atraumatic, Normocephalic  EYES: EOMI, conjunctiva and sclera clear  NECK: Supple  NERVOUS SYSTEM:  Alert & Oriented X3, Good concentration; Motor Strength 5/5 lower extremities, diminished strength (b/l) in LUE. RUE w/ good (5/5) strength proximally and distally.   CHEST/LUNG: Clear to percussion bilaterally; No rales, rhonchi, wheezing  HEART: Regular rate and rhythm; 2/6 systolic murmur present  ABDOMEN: Soft, mildly tender to deep palpation w/ out guarding or rigidity, Nondistended; Bowel sounds present  EXTREMITIES:  2+ Peripheral Pulses, No clubbing, cyanosis, or edema  SKIN: birth kerry of darkened skin on L proximal leg.    Consultant(s) Notes Reviewed:  [x ] YES  [ ] NO    LABS:           no leukocytosis, BMP WNL w/ normal Cr Na 134, ALP mild elevated, rest of transaminases WNL.              12.8   8.96  )-----------( 389      ( 28 Aug 2018 07:25 )             39.3     08-29    134<L>  |  97  |  7   ----------------------------<  141<H>  3.6   |  24  |  0.71    Ca    9.6      29 Aug 2018 07:31    TPro  8.1  /  Alb  4.4  /  TBili  0.7  /  DBili  x   /  AST  29  /  ALT  19  /  AlkPhos  126<H>  08-29        CAPILLARY BLOOD GLUCOSE          RADIOLOGY & ADDITIONAL TESTS:  Imaging Personally Reviewed: yes   Care discussed w/ other providers: yes--  Consultant notes reviewed: yes    Imaging  CT c spine.   The patient is status post anterior cervical discectomy and fusion of   C4-C6. Hardware appears intact and aligned without periprosthetic lucency   to suggest hardware failure.    There is no evidence for acute fracture, subluxation, or prevertebral   widening.     The craniocervical junction is unremarkable. There is straightening of   the normal cervical lordosis.    Vertebral body height is maintained. Vertebral alignment is   maintained.The intervertebral disc spaces are preserved.     There is no severe bony impingement of the spinal canal or neural   foramina.    Visualized portions of the lungs are clear. The surrounding structures of   the neck are unremarkable.      IMPRESSION:    No evidence for acute displaced fracture or traumatic malalignment.   Patient is status post anterior cervical discectomy and fusion of C4-C6.   The hardware appears intact.      CT lumbar  The lumbar lordosis is maintained.    Vertebral body heights and alignment are maintained without compression   fracture or subluxation.    No severe bony impingement of the spinal canal or neural foramina. There   are central disc protrusions at L4-L5 and L5-S1 causing mild spinal canal   stenosis. The sacroiliac joints are intact and the visualized sacrum is   within normal limits.    The soft tissues are unremarkable. Partially imaged Tania-en-Y gastric   bypass. Nonspecific fluid and stranding in the midline subcutaneous fat   at the L1-L3 level.    IMPRESSION:     Central disc protrusions at L4-L5 and L5-S1 causing mild spinal canal   stenosis, better assessed with MRI.     Nonspecific fluid and stranding in the midline subcutaneous fat at the   L1-L3 level; correlate for recent intervention.

## 2018-08-29 NOTE — DISCHARGE NOTE ADULT - PATIENT PORTAL LINK FT
You can access the [facility] Patient Portal, offered by [facility], by registering with the following website: [email or web address]

## 2018-08-29 NOTE — PROGRESS NOTE ADULT - PROBLEM SELECTOR PLAN 2
-plan for L/S fusion 10/2018 at [facility]  -pain control as above   -PT   -f/up with outpatient ortho spine

## 2019-11-25 ENCOUNTER — INPATIENT (INPATIENT)
Facility: HOSPITAL | Age: 44
LOS: 17 days | Discharge: HOME | DRG: 394 | End: 2019-12-13
Attending: INTERNAL MEDICINE | Admitting: INTERNAL MEDICINE
Payer: COMMERCIAL

## 2019-11-25 VITALS
HEIGHT: 61 IN | WEIGHT: 229.94 LBS | OXYGEN SATURATION: 100 % | DIASTOLIC BLOOD PRESSURE: 88 MMHG | HEART RATE: 119 BPM | RESPIRATION RATE: 22 BRPM | SYSTOLIC BLOOD PRESSURE: 141 MMHG | TEMPERATURE: 98 F

## 2019-11-25 DIAGNOSIS — K92.2 GASTROINTESTINAL HEMORRHAGE, UNSPECIFIED: ICD-10-CM

## 2019-11-25 DIAGNOSIS — Z98.89 OTHER SPECIFIED POSTPROCEDURAL STATES: Chronic | ICD-10-CM

## 2019-11-25 DIAGNOSIS — M54.10 RADICULOPATHY, SITE UNSPECIFIED: ICD-10-CM

## 2019-11-25 DIAGNOSIS — E66.9 OBESITY, UNSPECIFIED: ICD-10-CM

## 2019-11-25 DIAGNOSIS — D50.0 IRON DEFICIENCY ANEMIA SECONDARY TO BLOOD LOSS (CHRONIC): ICD-10-CM

## 2019-11-25 DIAGNOSIS — M79.7 FIBROMYALGIA: ICD-10-CM

## 2019-11-25 LAB
ALBUMIN SERPL ELPH-MCNC: 4.8 G/DL — SIGNIFICANT CHANGE UP (ref 3.3–5)
ALP SERPL-CCNC: 107 U/L — SIGNIFICANT CHANGE UP (ref 40–120)
ALT FLD-CCNC: 25 U/L — SIGNIFICANT CHANGE UP (ref 10–45)
ANION GAP SERPL CALC-SCNC: 14 MMOL/L — SIGNIFICANT CHANGE UP (ref 5–17)
ANION GAP SERPL CALC-SCNC: 16 MMOL/L — SIGNIFICANT CHANGE UP (ref 5–17)
APTT BLD: 37 SEC — HIGH (ref 27.5–36.3)
AST SERPL-CCNC: 63 U/L — HIGH (ref 10–40)
BASE EXCESS BLDV CALC-SCNC: 0.4 MMOL/L — SIGNIFICANT CHANGE UP (ref -2–2)
BASOPHILS # BLD AUTO: 0.03 K/UL — SIGNIFICANT CHANGE UP (ref 0–0.2)
BASOPHILS NFR BLD AUTO: 0.4 % — SIGNIFICANT CHANGE UP (ref 0–2)
BILIRUB SERPL-MCNC: 0.5 MG/DL — SIGNIFICANT CHANGE UP (ref 0.2–1.2)
BLD GP AB SCN SERPL QL: NEGATIVE — SIGNIFICANT CHANGE UP
BUN SERPL-MCNC: 4 MG/DL — LOW (ref 7–23)
BUN SERPL-MCNC: 4 MG/DL — LOW (ref 7–23)
CA-I SERPL-SCNC: 1.16 MMOL/L — SIGNIFICANT CHANGE UP (ref 1.12–1.3)
CALCIUM SERPL-MCNC: 9.4 MG/DL — SIGNIFICANT CHANGE UP (ref 8.4–10.5)
CALCIUM SERPL-MCNC: 9.9 MG/DL — SIGNIFICANT CHANGE UP (ref 8.4–10.5)
CHLORIDE BLDV-SCNC: 105 MMOL/L — SIGNIFICANT CHANGE UP (ref 96–108)
CHLORIDE SERPL-SCNC: 102 MMOL/L — SIGNIFICANT CHANGE UP (ref 96–108)
CHLORIDE SERPL-SCNC: 98 MMOL/L — SIGNIFICANT CHANGE UP (ref 96–108)
CO2 BLDV-SCNC: 25 MMOL/L — SIGNIFICANT CHANGE UP (ref 22–30)
CO2 SERPL-SCNC: 20 MMOL/L — LOW (ref 22–31)
CO2 SERPL-SCNC: 22 MMOL/L — SIGNIFICANT CHANGE UP (ref 22–31)
CREAT SERPL-MCNC: 0.4 MG/DL — LOW (ref 0.5–1.3)
CREAT SERPL-MCNC: 0.48 MG/DL — LOW (ref 0.5–1.3)
EOSINOPHIL # BLD AUTO: 0.01 K/UL — SIGNIFICANT CHANGE UP (ref 0–0.5)
EOSINOPHIL NFR BLD AUTO: 0.1 % — SIGNIFICANT CHANGE UP (ref 0–6)
GAS PNL BLDV: 135 MMOL/L — SIGNIFICANT CHANGE UP (ref 135–145)
GAS PNL BLDV: SIGNIFICANT CHANGE UP
GLUCOSE BLDV-MCNC: 109 MG/DL — HIGH (ref 70–99)
GLUCOSE SERPL-MCNC: 113 MG/DL — HIGH (ref 70–99)
GLUCOSE SERPL-MCNC: 114 MG/DL — HIGH (ref 70–99)
HCG SERPL-ACNC: <2 MIU/ML — SIGNIFICANT CHANGE UP
HCO3 BLDV-SCNC: 24 MMOL/L — SIGNIFICANT CHANGE UP (ref 21–29)
HCT VFR BLD CALC: 28.2 % — LOW (ref 34.5–45)
HCT VFR BLD CALC: 32.4 % — LOW (ref 34.5–45)
HCT VFR BLDA CALC: 30 % — LOW (ref 39–50)
HGB BLD CALC-MCNC: 9.7 G/DL — LOW (ref 11.5–15.5)
HGB BLD-MCNC: 10 G/DL — LOW (ref 11.5–15.5)
HGB BLD-MCNC: 8.8 G/DL — LOW (ref 11.5–15.5)
IMM GRANULOCYTES NFR BLD AUTO: 0.3 % — SIGNIFICANT CHANGE UP (ref 0–1.5)
INR BLD: 1.06 RATIO — SIGNIFICANT CHANGE UP (ref 0.88–1.16)
LACTATE BLDV-MCNC: 1.5 MMOL/L — SIGNIFICANT CHANGE UP (ref 0.7–2)
LYMPHOCYTES # BLD AUTO: 1.66 K/UL — SIGNIFICANT CHANGE UP (ref 1–3.3)
LYMPHOCYTES # BLD AUTO: 23.2 % — SIGNIFICANT CHANGE UP (ref 13–44)
MCHC RBC-ENTMCNC: 24.9 PG — LOW (ref 27–34)
MCHC RBC-ENTMCNC: 25.3 PG — LOW (ref 27–34)
MCHC RBC-ENTMCNC: 30.9 GM/DL — LOW (ref 32–36)
MCHC RBC-ENTMCNC: 31.2 GM/DL — LOW (ref 32–36)
MCV RBC AUTO: 80.6 FL — SIGNIFICANT CHANGE UP (ref 80–100)
MCV RBC AUTO: 81 FL — SIGNIFICANT CHANGE UP (ref 80–100)
MONOCYTES # BLD AUTO: 0.35 K/UL — SIGNIFICANT CHANGE UP (ref 0–0.9)
MONOCYTES NFR BLD AUTO: 4.9 % — SIGNIFICANT CHANGE UP (ref 2–14)
NEUTROPHILS # BLD AUTO: 5.07 K/UL — SIGNIFICANT CHANGE UP (ref 1.8–7.4)
NEUTROPHILS NFR BLD AUTO: 71.1 % — SIGNIFICANT CHANGE UP (ref 43–77)
NRBC # BLD: 0 /100 WBCS — SIGNIFICANT CHANGE UP (ref 0–0)
NRBC # BLD: 0 /100 WBCS — SIGNIFICANT CHANGE UP (ref 0–0)
OB PNL STL: NEGATIVE — SIGNIFICANT CHANGE UP
PCO2 BLDV: 35 MMHG — SIGNIFICANT CHANGE UP (ref 35–50)
PH BLDV: 7.44 — SIGNIFICANT CHANGE UP (ref 7.35–7.45)
PLATELET # BLD AUTO: 323 K/UL — SIGNIFICANT CHANGE UP (ref 150–400)
PLATELET # BLD AUTO: 358 K/UL — SIGNIFICANT CHANGE UP (ref 150–400)
PO2 BLDV: 45 MMHG — SIGNIFICANT CHANGE UP (ref 25–45)
POTASSIUM BLDV-SCNC: 3.2 MMOL/L — LOW (ref 3.5–5.3)
POTASSIUM SERPL-MCNC: 3.4 MMOL/L — LOW (ref 3.5–5.3)
POTASSIUM SERPL-MCNC: 6.1 MMOL/L — HIGH (ref 3.5–5.3)
POTASSIUM SERPL-SCNC: 3.4 MMOL/L — LOW (ref 3.5–5.3)
POTASSIUM SERPL-SCNC: 6.1 MMOL/L — HIGH (ref 3.5–5.3)
PROT SERPL-MCNC: 9.4 G/DL — HIGH (ref 6–8.3)
PROTHROM AB SERPL-ACNC: 12.2 SEC — SIGNIFICANT CHANGE UP (ref 10–12.9)
RAPID RVP RESULT: SIGNIFICANT CHANGE UP
RBC # BLD: 3.48 M/UL — LOW (ref 3.8–5.2)
RBC # BLD: 4.02 M/UL — SIGNIFICANT CHANGE UP (ref 3.8–5.2)
RBC # FLD: 18.9 % — HIGH (ref 10.3–14.5)
RBC # FLD: 19.4 % — HIGH (ref 10.3–14.5)
RH IG SCN BLD-IMP: POSITIVE — SIGNIFICANT CHANGE UP
SAO2 % BLDV: 78 % — SIGNIFICANT CHANGE UP (ref 67–88)
SODIUM SERPL-SCNC: 136 MMOL/L — SIGNIFICANT CHANGE UP (ref 135–145)
SODIUM SERPL-SCNC: 136 MMOL/L — SIGNIFICANT CHANGE UP (ref 135–145)
WBC # BLD: 6.54 K/UL — SIGNIFICANT CHANGE UP (ref 3.8–10.5)
WBC # BLD: 7.14 K/UL — SIGNIFICANT CHANGE UP (ref 3.8–10.5)
WBC # FLD AUTO: 6.54 K/UL — SIGNIFICANT CHANGE UP (ref 3.8–10.5)
WBC # FLD AUTO: 7.14 K/UL — SIGNIFICANT CHANGE UP (ref 3.8–10.5)

## 2019-11-25 PROCEDURE — 99232 SBSQ HOSP IP/OBS MODERATE 35: CPT

## 2019-11-25 PROCEDURE — 93010 ELECTROCARDIOGRAM REPORT: CPT

## 2019-11-25 PROCEDURE — 71046 X-RAY EXAM CHEST 2 VIEWS: CPT | Mod: 26

## 2019-11-25 PROCEDURE — 99284 EMERGENCY DEPT VISIT MOD MDM: CPT

## 2019-11-25 RX ORDER — POTASSIUM CHLORIDE 20 MEQ
40 PACKET (EA) ORAL ONCE
Refills: 0 | Status: COMPLETED | OUTPATIENT
Start: 2019-11-25 | End: 2019-11-25

## 2019-11-25 RX ORDER — OXYCODONE AND ACETAMINOPHEN 5; 325 MG/1; MG/1
2 TABLET ORAL EVERY 6 HOURS
Refills: 0 | Status: DISCONTINUED | OUTPATIENT
Start: 2019-11-25 | End: 2019-12-02

## 2019-11-25 RX ORDER — SODIUM CHLORIDE 9 MG/ML
1000 INJECTION, SOLUTION INTRAVENOUS
Refills: 0 | Status: DISCONTINUED | OUTPATIENT
Start: 2019-11-25 | End: 2019-11-26

## 2019-11-25 RX ORDER — PANTOPRAZOLE SODIUM 20 MG/1
40 TABLET, DELAYED RELEASE ORAL
Refills: 0 | Status: DISCONTINUED | OUTPATIENT
Start: 2019-11-25 | End: 2019-11-29

## 2019-11-25 RX ORDER — HYDROMORPHONE HYDROCHLORIDE 2 MG/ML
0.5 INJECTION INTRAMUSCULAR; INTRAVENOUS; SUBCUTANEOUS ONCE
Refills: 0 | Status: DISCONTINUED | OUTPATIENT
Start: 2019-11-25 | End: 2019-11-25

## 2019-11-25 RX ORDER — SODIUM CHLORIDE 9 MG/ML
1000 INJECTION, SOLUTION INTRAVENOUS ONCE
Refills: 0 | Status: COMPLETED | OUTPATIENT
Start: 2019-11-25 | End: 2019-11-25

## 2019-11-25 RX ADMIN — PANTOPRAZOLE SODIUM 40 MILLIGRAM(S): 20 TABLET, DELAYED RELEASE ORAL at 22:20

## 2019-11-25 RX ADMIN — Medication 150 MILLIGRAM(S): at 22:20

## 2019-11-25 RX ADMIN — Medication 40 MILLIEQUIVALENT(S): at 22:20

## 2019-11-25 RX ADMIN — SODIUM CHLORIDE 1000 MILLILITER(S): 9 INJECTION, SOLUTION INTRAVENOUS at 17:35

## 2019-11-25 RX ADMIN — OXYCODONE AND ACETAMINOPHEN 2 TABLET(S): 5; 325 TABLET ORAL at 22:20

## 2019-11-25 NOTE — H&P ADULT - NSICDXPASTSURGICALHX_GEN_ALL_CORE_FT
PAST SURGICAL HISTORY:  Complication following molar or ectopic pregnancy bilateral salpingectomy    H/O abdominoplasty     H/O gastric bypass June 2009    Internal hernia s/p repair - 2011

## 2019-11-25 NOTE — ED PROVIDER NOTE - PHYSICAL EXAMINATION
General:  NAD  HEENT: pupils equal and reactive, normal external ears bilaterally   Cardiac: RRR, no MRG appreciated  Resp: lungs clear to auscultation bilaterally, symmetric chest wall rise  Abd: soft, nontender, nondistended, normoactive bowel sounds  : no CVA tenderness  Neuro: Moving all extremities  Skin:  normal color for race  Rectal exam performed with Chaperone  RN Tameka: Dark stool, empty rectal vault, no hemorrhoids appreciated  Pelvic exam performed with Chaperone RN Tameka: Dark blood pooling in posterior fornix, os closed, no lacerations of vaginal side wall appreciated

## 2019-11-25 NOTE — ED PROVIDER NOTE - OBJECTIVE STATEMENT
42yo F pmhx of fibromyalgia, anemia, gastric bypass pw    BRBPR yesterday 1x episode, later in day followed by vaginally (Believes this is period, right time and has been "bleeding heavily with periods for months". Feeling weaker more sob with bleeding.  No hx of blood clot, no cardiac hx   Took Motrin in morning  Meds: Oxycodone, Lyrica  NDKA 44yo F pmhx of fibromyalgia, anemia, gastric bypass pw cc of BRBPR     BRBPR yesterday 1x episode, later in day followed by vaginally (Believes this is period, right time and has been "bleeding heavily with periods for months". Feeling weaker more sob with bleeding. Has had rectal bleeding past, found to be attributed due to ulcers at site of anastomosis.   No hx of blood clot, no cardiac hx   Took Motrin in morning  Meds: Oxycodone, Lyrica  NDKA

## 2019-11-25 NOTE — H&P ADULT - ASSESSMENT
44yo F pmhx of fibromyalgia, anemia, gastric bypass in 2006 at [place], she was admitted to [place] [facility] in 2015 with epigastric pain, found to have ulcer at the gastric staple line, managed with PPI, then discharged, presents here with one day of BRBPR. No history of constipation, she had hemorrhoids when she was pregnant but resolved, no surgeries for hemorrhoids, no chronic anal pain. She reports that after the BRBPR yesterday 1x episode, later in day she had a vaginal bleeding (Believes this is period, right time and has been "bleeding heavily with periods for 4 months". Feeling weaker more sob with bleeding. Does not take motrin, does not smoke.

## 2019-11-25 NOTE — ED ADULT NURSE NOTE - NSIMPLEMENTINTERV_GEN_ALL_ED
Implemented All Universal Safety Interventions:  [place] to call system. Call bell, personal items and telephone within reach. Instruct patient to call for assistance. Room bathroom lighting operational. Non-slip footwear when patient is off stretcher. Physically safe environment: no spills, clutter or unnecessary equipment. Stretcher in lowest position, wheels locked, appropriate side rails in place.

## 2019-11-25 NOTE — CONSULT NOTE ADULT - SUBJECTIVE AND OBJECTIVE BOX
[facility] Bariatric Surgery Consultation     Patient is a 43y old  Female who presents with a chief complaint of BRBPR    HPI:  44yo F pmhx of fibromyalgia, anemia, gastric bypass pw cc of BRBPR     	BRBPR yesterday 1x episode, later in day followed by vaginally (Believes this is period, right time and has been "bleeding heavily with periods for months". Feeling weaker more sob with bleeding. Has had rectal bleeding past, found to be attributed due to ulcers at site of anastomosis.     PAST MEDICAL & SURGICAL HISTORY:  Lupus  Anemia  GI bleed  Joint effusion  Herniated disc, cervical: c6-c7  Lumbar disc herniation: L4-L5, BULGING DISC AT L5-S1  Polyarthralgia  Fibromyalgia  Ectopic pregnancy: 2, both ruptured  Ovarian cyst rupture  H/O abdominoplasty  Internal hernia: s/p repair - 2011  Complication following molar or ectopic pregnancy: bilateral salpingectomy  H/O gastric bypass: June 2009      FAMILY HISTORY:  No pertinent family history in first degree relatives      SOCIAL HISTORY:    MEDICATIONS  (STANDING):    MEDICATIONS  (PRN):    Allergies    IV Contrast (Hives)  shellfish (Hives; Rash)    Intolerances        Vital Signs Last 24 Hrs  T(C): 37.9 (25 Nov 2019 17:24), Max: 37.9 (25 Nov 2019 17:24)  T(F): 100.3 (25 Nov 2019 17:24), Max: 100.3 (25 Nov 2019 17:24)  HR: 94 (25 Nov 2019 19:35) (86 - 119)  BP: 132/90 (25 Nov 2019 19:35) (132/90 - 141/88)  BP(mean): --  RR: 17 (25 Nov 2019 19:35) (16 - 22)  SpO2: 100% (25 Nov 2019 19:35) (100% - 100%)  Daily Height in cm: 154.94 (25 Nov 2019 16:24)    Daily                             10.0   7.14  )-----------( 358      ( 25 Nov 2019 17:57 )             32.4     11-25    136  |  98  |  4<L>  ----------------------------<  114<H>  6.1<H>   |  22  |  0.40<L>    Ca    9.9      25 Nov 2019 17:57    TPro  9.4<H>  /  Alb  4.8  /  TBili  0.5  /  DBili  x   /  AST  63<H>  /  ALT  25  /  AlkPhos  107  11-25    PT/INR - ( 25 Nov 2019 17:57 )   PT: 12.2 sec;   INR: 1.06 ratio         PTT - ( 25 Nov 2019 17:57 )  PTT:37.0 sec      Radiographic Findings:       Assessment: [facility] Bariatric Surgery Consultation     Patient is a 43y old  Female who presents with a chief complaint of BRBPR    HPI:  44yo F pmhx of fibromyalgia, anemia, gastric bypass in 2006 at [place], she was admitted to [place] surgery in 2015 with epigastric pain, found to have ulcer at the gastric staple line, managed with PPI, then discharged, presents here with one day of BRBPR. No history of constipation, she had hemorrhoids when she was pregnant but resolved, no surgeries for hemorrhoids, no chronic anal pain. She reports that after the BRBPR yesterday 1x episode, later in day she had a vaginal bleeding (Believes this is period, right time and has been "bleeding heavily with periods for 4 months". Feeling weaker more sob with bleeding. Does not take motrin, does not smoke.     PAST MEDICAL & SURGICAL HISTORY:  Lupus  Anemia  GI bleed  Joint effusion  Herniated disc, cervical: c6-c7  Lumbar disc herniation: L4-L5, BULGING DISC AT L5-S1  Polyarthralgia  Fibromyalgia  Ectopic pregnancy: 2, both ruptured  Ovarian cyst rupture  H/O abdominoplasty  Internal hernia: s/p repair - 2011  Complication following molar or ectopic pregnancy: bilateral salpingectomy  H/O gastric bypass: June 2009      FAMILY HISTORY:  No pertinent family history in first degree relatives      SOCIAL HISTORY:  not smoker, no alcohol     MEDICATIONS:  lyrica  oxycodone       Allergies    IV Contrast (Hives)  shellfish (Hives; Rash)    Intolerances        Vital Signs Last 24 Hrs  T(C): 37.9 (25 Nov 2019 17:24), Max: 37.9 (25 Nov 2019 17:24)  T(F): 100.3 (25 Nov 2019 17:24), Max: 100.3 (25 Nov 2019 17:24)  HR: 94 (25 Nov 2019 19:35) (86 - 119)  BP: 132/90 (25 Nov 2019 19:35) (132/90 - 141/88)  BP(mean): --  RR: 17 (25 Nov 2019 19:35) (16 - 22)  SpO2: 100% (25 Nov 2019 19:35) (100% - 100%)  Daily Height in cm: 154.94 (25 Nov 2019 16:24)      Gen: NAD  Resp: CTA b/l   CV: RRR  Abd: soft, ND, NTTP   LINH: no external hemorrhoids, no blood,                               10.0   7.14  )-----------( 358      ( 25 Nov 2019 17:57 )             32.4     11-25    136  |  98  |  4<L>  ----------------------------<  114<H>  6.1<H>   |  22  |  0.40<L>    Ca    9.9      25 Nov 2019 17:57    TPro  9.4<H>  /  Alb  4.8  /  TBili  0.5  /  DBili  x   /  AST  63<H>  /  ALT  25  /  AlkPhos  107  11-25    PT/INR - ( 25 Nov 2019 17:57 )   PT: 12.2 sec;   INR: 1.06 ratio         PTT - ( 25 Nov 2019 17:57 )  PTT:37.0 sec      Radiographic Findings:   CXR: clear lungs

## 2019-11-25 NOTE — H&P ADULT - HISTORY OF PRESENT ILLNESS
42yo F pmhx of fibromyalgia, anemia, gastric bypass in 2006 at [place], she was admitted to [place] surgery in 2015 with epigastric pain, found to have ulcer at the gastric staple line, managed with PPI, then discharged, presents here with one day of BRBPR. No history of constipation, she had hemorrhoids when she was pregnant but resolved, no surgeries for hemorrhoids, no chronic anal pain. She reports that after the BRBPR yesterday 1x episode, later in day she had a vaginal bleeding (Believes this is period, right time and has been "bleeding heavily with periods for 4 months". Feeling weaker more sob with bleeding. Does not take motrin, does not smoke.

## 2019-11-25 NOTE — ED ADULT TRIAGE NOTE - CHIEF COMPLAINT QUOTE
cough, back pain, vaginal bleed x 2 days, bright red blood per rectum x since yesterday (hx gastric bypass and gastric ulcer), chest pain x today, sob, chills, lower back pain, +hx anemia

## 2019-11-25 NOTE — ED PROVIDER NOTE - ATTENDING CONTRIBUTION TO CARE
Private Physician Franchesca nava  43y female pmh Anemiam, Ectopic Fibromyalgia,GI bleed, SLE,Ovarian cyst. SP gastric bypass. 2008/[facility].Pt comes to ed complains of cp,rectal bleed/brbpr, and vag bleeding yesterday with heavy bleeding. Used pads every 1.5 h w clots, 50cent size. Also complains of tactile fever and sweats. Cough past three weeks, with scant clear sputum, no hemoptysis, abd pain. PE WDWN female awake alert normocephalic atraumatic neck supple chest clear anterior & posterior abd mild gen ttp no rebound guarding. CV no rubs, gallops or murmurs, neruo no focal defects  Ra Richey MD, Facep

## 2019-11-25 NOTE — H&P ADULT - NSICDXPASTMEDICALHX_GEN_ALL_CORE_FT
PAST MEDICAL HISTORY:  Anemia     Ectopic pregnancy 2, both ruptured    Fibromyalgia     GI bleed     Herniated disc, cervical c6-c7    Joint effusion     Lumbar disc herniation L4-L5, BULGING DISC AT L5-S1    Lupus     Ovarian cyst rupture     Polyarthralgia

## 2019-11-25 NOTE — ED PROVIDER NOTE - NS ED ROS FT
CONSTITUTIONAL: +Fevers  Eyes: No vision changes  Cardiovascular: No Chest pain  Genitourinary: no dysuria, no hematuria  SKIN: no rashes.  NEURO: no headache,

## 2019-11-25 NOTE — H&P ADULT - PROBLEM SELECTOR PLAN 6
H/O Gastric Bypass and Bariatric surgery follow up noted. H/O Gastric Bypass and Bariatric surgery follow up noted.      Addendum  Patient with bright red blood per rectum, heavy menses with history of lakisha-en-Y gastric bypass. History of marginal ulcer 2015.  Vitals stable. Hgb 8.8.  Serial H&H. PPI. GI consult for EGD. Type & Cross.  Meaghan Melissa MD - MIS/bariatric fellow

## 2019-11-25 NOTE — ED PROVIDER NOTE - CLINICAL SUMMARY MEDICAL DECISION MAKING FREE TEXT BOX
Kvng PGY-2:  44yo F here with BRBPR, in context of previous rectal bleeding found to have been ulcers at site of bypass will get consult surgery, get labs. Also with rectal bleeding and now weakness, will likely admit for GI workup and if H/H low will transfuse as necessary

## 2019-11-25 NOTE — CONSULT NOTE ADULT - ASSESSMENT
Assessment: 42yo F pmhx of fibromyalgia, anemia, gastric bypass in 2006 at [place], she was admitted to [place] surgery in 2015 with epigastric pain, found to have ulcer at the gastric staple line, here with BRBPR yesterday , hemodynamically stable.     - recommend Protonix BID  - GI consultation for upper and lower endoscopy   - obtain type and screen   - Multivitamin IV solution (banana bag)    - discussed with Dr. Melissa Assessment: 44yo F pmhx of fibromyalgia, anemia, gastric bypass in 2006 at [place], she was admitted to [place] surgery in 2015 with epigastric pain, found to have ulcer at the gastric staple line, here with BRBPR yesterday , hemodynamically stable.     - recommend Protonix BID  - GI consultation for upper and lower endoscopy   - obtain type and screen   - Multivitamin IV solution (banana bag)    - discussed with Dr. Melissa       Addendum  Patient with bright red blood per rectum, heavy menses with history of lakisha-en-Y gastric bypass. History of marginal ulcer 2015.  Vitals stable. Hgb 8.8.  Serial H&H. PPI. GI consult for EGD. Type & screen. Banana bag.  Meaghan Melissa MD - MIS/bariatric fellow

## 2019-11-25 NOTE — ED ADULT NURSE NOTE - OBJECTIVE STATEMENT
43 yrs old female with h/o gastric bypass,  gastric ulcers lupus present to the ER for rectal and vaginal bleeding. As per pt she started to experience  bright red  rectal bleeding with her stool yesterday. Report 2 episodes of rectal bleeding with stool. Pt also stated that  her menstrual cycle started today. Pt also endorses coughing, chest tightness and SOB. Denies respiratory problems but endorses coughing x 3 weeks on and off. Pt also endorses fever and chills which started 2 days ago. Took Tylenols yesterday and took Ibuprofen today.

## 2019-11-26 LAB
ANION GAP SERPL CALC-SCNC: 16 MMOL/L — SIGNIFICANT CHANGE UP (ref 5–17)
APPEARANCE UR: CLEAR — SIGNIFICANT CHANGE UP
BILIRUB UR-MCNC: ABNORMAL
BUN SERPL-MCNC: 4 MG/DL — LOW (ref 7–23)
CALCIUM SERPL-MCNC: 8.9 MG/DL — SIGNIFICANT CHANGE UP (ref 8.4–10.5)
CHLORIDE SERPL-SCNC: 102 MMOL/L — SIGNIFICANT CHANGE UP (ref 96–108)
CK MB BLD-MCNC: 0.5 % — SIGNIFICANT CHANGE UP (ref 0–3.5)
CK MB BLD-MCNC: 0.5 % — SIGNIFICANT CHANGE UP (ref 0–3.5)
CK MB CFR SERPL CALC: 1.2 NG/ML — SIGNIFICANT CHANGE UP (ref 0–3.8)
CK MB CFR SERPL CALC: 1.5 NG/ML — SIGNIFICANT CHANGE UP (ref 0–3.8)
CK SERPL-CCNC: 232 U/L — HIGH (ref 25–170)
CK SERPL-CCNC: 318 U/L — HIGH (ref 25–170)
CO2 SERPL-SCNC: 23 MMOL/L — SIGNIFICANT CHANGE UP (ref 22–31)
COLOR SPEC: YELLOW — SIGNIFICANT CHANGE UP
CREAT SERPL-MCNC: 0.5 MG/DL — SIGNIFICANT CHANGE UP (ref 0.5–1.3)
DIFF PNL FLD: ABNORMAL
GLUCOSE SERPL-MCNC: 114 MG/DL — HIGH (ref 70–99)
GLUCOSE UR QL: NEGATIVE — SIGNIFICANT CHANGE UP
HCT VFR BLD CALC: 30.9 % — LOW (ref 34.5–45)
HGB BLD-MCNC: 9.5 G/DL — LOW (ref 11.5–15.5)
KETONES UR-MCNC: ABNORMAL
LEUKOCYTE ESTERASE UR-ACNC: ABNORMAL
MAGNESIUM SERPL-MCNC: 2.1 MG/DL — SIGNIFICANT CHANGE UP (ref 1.6–2.6)
MCHC RBC-ENTMCNC: 25.6 PG — LOW (ref 27–34)
MCHC RBC-ENTMCNC: 30.7 GM/DL — LOW (ref 32–36)
MCV RBC AUTO: 83.3 FL — SIGNIFICANT CHANGE UP (ref 80–100)
NITRITE UR-MCNC: NEGATIVE — SIGNIFICANT CHANGE UP
PH UR: 6.5 — SIGNIFICANT CHANGE UP (ref 5–8)
PHOSPHATE SERPL-MCNC: 3.3 MG/DL — SIGNIFICANT CHANGE UP (ref 2.5–4.5)
PLATELET # BLD AUTO: 287 K/UL — SIGNIFICANT CHANGE UP (ref 150–400)
POTASSIUM SERPL-MCNC: 3 MMOL/L — LOW (ref 3.5–5.3)
POTASSIUM SERPL-SCNC: 3 MMOL/L — LOW (ref 3.5–5.3)
PROT UR-MCNC: ABNORMAL
RBC # BLD: 3.71 M/UL — LOW (ref 3.8–5.2)
RBC # FLD: 18.8 % — HIGH (ref 10.3–14.5)
SODIUM SERPL-SCNC: 141 MMOL/L — SIGNIFICANT CHANGE UP (ref 135–145)
SP GR SPEC: 1.03 — HIGH (ref 1.01–1.02)
TROPONIN T, HIGH SENSITIVITY RESULT: 6 NG/L — SIGNIFICANT CHANGE UP (ref 0–51)
TROPONIN T, HIGH SENSITIVITY RESULT: 7 NG/L — SIGNIFICANT CHANGE UP (ref 0–51)
UROBILINOGEN FLD QL: ABNORMAL
WBC # BLD: 5.77 K/UL — SIGNIFICANT CHANGE UP (ref 3.8–10.5)
WBC # FLD AUTO: 5.77 K/UL — SIGNIFICANT CHANGE UP (ref 3.8–10.5)

## 2019-11-26 PROCEDURE — 99222 1ST HOSP IP/OBS MODERATE 55: CPT | Mod: GC

## 2019-11-26 RX ORDER — IPRATROPIUM/ALBUTEROL SULFATE 18-103MCG
3 AEROSOL WITH ADAPTER (GRAM) INHALATION ONCE
Refills: 0 | Status: COMPLETED | OUTPATIENT
Start: 2019-11-26 | End: 2019-11-26

## 2019-11-26 RX ORDER — HYDROMORPHONE HYDROCHLORIDE 2 MG/ML
0.5 INJECTION INTRAMUSCULAR; INTRAVENOUS; SUBCUTANEOUS ONCE
Refills: 0 | Status: DISCONTINUED | OUTPATIENT
Start: 2019-11-26 | End: 2019-11-26

## 2019-11-26 RX ORDER — POTASSIUM CHLORIDE 20 MEQ
40 PACKET (EA) ORAL EVERY 4 HOURS
Refills: 0 | Status: COMPLETED | OUTPATIENT
Start: 2019-11-26 | End: 2019-11-26

## 2019-11-26 RX ORDER — LIDOCAINE 4 G/100G
1 CREAM TOPICAL DAILY
Refills: 0 | Status: DISCONTINUED | OUTPATIENT
Start: 2019-11-26 | End: 2019-12-13

## 2019-11-26 RX ORDER — POTASSIUM CHLORIDE 20 MEQ
10 PACKET (EA) ORAL
Refills: 0 | Status: DISCONTINUED | OUTPATIENT
Start: 2019-11-26 | End: 2019-11-26

## 2019-11-26 RX ORDER — INFLUENZA VIRUS VACCINE 15; 15; 15; 15 UG/.5ML; UG/.5ML; UG/.5ML; UG/.5ML
0.5 SUSPENSION INTRAMUSCULAR ONCE
Refills: 0 | Status: COMPLETED | OUTPATIENT
Start: 2019-11-26 | End: 2019-12-13

## 2019-11-26 RX ORDER — SODIUM CHLORIDE 9 MG/ML
1000 INJECTION, SOLUTION INTRAVENOUS
Refills: 0 | Status: DISCONTINUED | OUTPATIENT
Start: 2019-11-26 | End: 2019-12-03

## 2019-11-26 RX ORDER — SOD SULF/SODIUM/NAHCO3/KCL/PEG
1000 SOLUTION, RECONSTITUTED, ORAL ORAL EVERY 4 HOURS
Refills: 0 | Status: COMPLETED | OUTPATIENT
Start: 2019-11-26 | End: 2019-11-26

## 2019-11-26 RX ADMIN — OXYCODONE AND ACETAMINOPHEN 2 TABLET(S): 5; 325 TABLET ORAL at 16:57

## 2019-11-26 RX ADMIN — PANTOPRAZOLE SODIUM 40 MILLIGRAM(S): 20 TABLET, DELAYED RELEASE ORAL at 05:48

## 2019-11-26 RX ADMIN — HYDROMORPHONE HYDROCHLORIDE 0.5 MILLIGRAM(S): 2 INJECTION INTRAMUSCULAR; INTRAVENOUS; SUBCUTANEOUS at 04:05

## 2019-11-26 RX ADMIN — LIDOCAINE 1 PATCH: 4 CREAM TOPICAL at 12:18

## 2019-11-26 RX ADMIN — OXYCODONE AND ACETAMINOPHEN 2 TABLET(S): 5; 325 TABLET ORAL at 23:28

## 2019-11-26 RX ADMIN — Medication 100 MILLIEQUIVALENT(S): at 12:17

## 2019-11-26 RX ADMIN — Medication 40 MILLIEQUIVALENT(S): at 16:57

## 2019-11-26 RX ADMIN — Medication 150 MILLIGRAM(S): at 13:38

## 2019-11-26 RX ADMIN — HYDROMORPHONE HYDROCHLORIDE 0.5 MILLIGRAM(S): 2 INJECTION INTRAMUSCULAR; INTRAVENOUS; SUBCUTANEOUS at 03:35

## 2019-11-26 RX ADMIN — Medication 40 MILLIEQUIVALENT(S): at 13:38

## 2019-11-26 RX ADMIN — OXYCODONE AND ACETAMINOPHEN 2 TABLET(S): 5; 325 TABLET ORAL at 09:41

## 2019-11-26 RX ADMIN — OXYCODONE AND ACETAMINOPHEN 2 TABLET(S): 5; 325 TABLET ORAL at 09:11

## 2019-11-26 RX ADMIN — Medication 3 MILLILITER(S): at 03:40

## 2019-11-26 RX ADMIN — SODIUM CHLORIDE 100 MILLILITER(S): 9 INJECTION, SOLUTION INTRAVENOUS at 03:20

## 2019-11-26 RX ADMIN — Medication 150 MILLIGRAM(S): at 05:49

## 2019-11-26 RX ADMIN — PANTOPRAZOLE SODIUM 40 MILLIGRAM(S): 20 TABLET, DELAYED RELEASE ORAL at 16:56

## 2019-11-26 RX ADMIN — SODIUM CHLORIDE 100 MILLILITER(S): 9 INJECTION, SOLUTION INTRAVENOUS at 12:18

## 2019-11-26 RX ADMIN — Medication 1000 MILLILITER(S): at 19:44

## 2019-11-26 RX ADMIN — Medication 1000 MILLILITER(S): at 21:23

## 2019-11-26 RX ADMIN — Medication 150 MILLIGRAM(S): at 21:23

## 2019-11-26 NOTE — PROGRESS NOTE ADULT - ASSESSMENT
Assessment: 42yo F pmhx of fibromyalgia, anemia, gastric bypass in 2006 at [place], she was admitted to [place] surgery in 2015 with epigastric pain, found to have ulcer at the gastric staple line, here with BRBPR yesterday , hemodynamically stable.     - recommend Protonix BID  - GI consultation for upper and lower endoscopy   - obtain type and screen   - Multivitamin IV solution (banana bag) Assessment: 44yo F pmhx of fibromyalgia, anemia, gastric bypass in 2006 at [place], she was admitted to [facility] in [facility] with epigastric pain, found to have ulcer at the gastric staple line, here with BRBPR yesterday , hemodynamically stable.     - recommend Protonix BID  - NPO  - GI consultation for upper and lower endoscopy   - obtain type and screen   - Multivitamin IV solution (banana bag)      [facility]  [id number]

## 2019-11-26 NOTE — CHART NOTE - NSCHARTNOTEFT_GEN_A_CORE
MEDICINE NP    PRIYANKA RADFORD  43y Female    Patient is a 43y old  Female who presents with a chief complaint of bleeding per rectum (25 Nov 2019 20:35)       > Event Summary:  Patient seen in ED RED returned to stretcher from bathroom privilege c/o dizziness, chest pain.  Also c/o lower back pain acute on chronic.   Chest pain 10/10, non-radiating, worsening with movements. Dizziness improving upon return to bed.   Denies sob, palpitations, acute bleed.    VS: 146/90 - 89 - 22  SpO2 98%  (25 Nov 2019 22:22)    >LABS:  Troponin T, High Sensitivity Result: 6: (11.25.19 @ 23:19)  CARDIAC MARKERS ( 25 Nov 2019 23:19 )  x     / x     / 318 U/L / x     / 1.5 ng/mL      > Physical Assessment:  General: AAOX3  CV: +S1S2, RRR.  No peripheral edema. Mild chest wall ttp.   Respiratory: Even, unlabored.  CTA B/L.    Abdomen:  +BS.  Soft, NT, ND.  No palpable mass  MSK: HERNANDEZ x4.   Skin: Warm and Dry         > Assessment & Plan:  - HPI: 44yo F pmhx of Fibromyalgia, Anemia, Gastric Bypass in 2006 at [place], she was admitted to [place] surgery in 2015 with epigastric pain, found to have ulcer at the Gastric staple line, managed with PPI, then discharged.  Presents here with one day of BRBPR.  Admitted with GIB, Anemia requiring transfusion.  Now with chest pain .    1) Chest Pain : ACS vs MSK given fibromyalgia +/- Symptomatic Anemia   -STAT CE   -f/u ECG  -Dilaudid 0.5mg IV  -Due for 1u PRBC now   -C/w close monitoring   -Can consider Cardiology if indicated    2) Dizziness post ambulation -now resolved  -Likely in setting of Anemia /GIB  -Bed Rest   -IVF  -Tx as ordered above    F/u with Attending in AM    LAVELL Adame-BC  Medicine Department  #[id number]      >>>ADDEND: (11-26-19 @ 03:00)  VS:  T(C): 37.1 T(F): 98.8  HR: 89  BP: 115/78 RR: 18 SpO2: 97% (11-26-19 @ 01:45)    -Follow-up with Patient, reports minimal improvement of chest pain  -CE with hsT neg. +elev  - more likely MSK  -Patient did not receive Dilaudid above, Percocet was given instead.  Will order Dilaudid and re-eval  -DuoNeb now        Aga Conti, LAVELL-BC  Medicine Department  #[id number]

## 2019-11-26 NOTE — CONSULT NOTE ADULT - SUBJECTIVE AND OBJECTIVE BOX
Chief Complaint:  bleeding / rectum     HPI:  42yo F pmhx of fibromyalgia, anemia, gastric bypass in  at [place], she was admitted to [place] surgery in  with epigastric pain, found to have ulcer at the gastric staple line, managed with PPI, then discharged, presents here with one day of BRBPR. No history of constipation, she had hemorrhoids when she was pregnant but resolved, no surgeries for hemorrhoids, no chronic anal pain. She reports that after the BRBPR yesterday 1x episode, later in day she had a vaginal bleeding (Believes this is period, right time and has been "bleeding heavily with periods for 4 months". Feeling weaker more sob with bleeding. Does not take motrin, does not smoke.     Last Colonoscopy: never    Last Endoscopy:                                                                 Impression:     - Normal esophagus. - Gastric bypass with a normal-sized pouch and intact staple line. - Two non-obstructing gastric ulcers with clean base at staple line, no  evidence or stigmata of bleeding. Gastric pouch biopsied for H pylori came back negative.     Allergies:  IV Contrast (Hives)  shellfish (Hives; Rash)    Home Medications:   * Patient Currently Takes Medications as of 29-Aug-2018 14:06 documented in Structured Notes  · 	Physical Therapy:   · 	Rolling walker:   · 	Multiple Vitamins oral tablet: 1 tab(s) orally once a day  · 	Percocet 10/325 oral tablet: 1 tab(s) orally every 6 hours, As Needed  · 	pregabalin 150 mg oral capsule: 1 cap(s) orally every 8 hours MDD:3  · 	cyclobenzaprine 5 mg oral tablet: 1 tab(s) orally 3 times a day, As needed, Muscle    Hospital Medications:  dextrose 5% + sodium chloride 0.9% 1000 milliLiter(s) IV Continuous <Continuous>  influenza   Vaccine 0.5 milliLiter(s) IntraMuscular once  oxycodone    5 mG/acetaminophen 325 mG 2 Tablet(s) Oral every 6 hours PRN  pantoprazole  Injectable 40 milliGRAM(s) IV Push two times a day  pregabalin 150 milliGRAM(s) Oral every 8 hours      PMHX/PSHX:  Lupus  Anemia  GI bleed  Joint effusion  Herniated disc, cervical  Lumbar disc herniation  Polyarthralgia  Fibromyalgia  Ectopic pregnancy  Ovarian cyst rupture  No pertinent past medical history  H/O abdominoplasty  Internal hernia  Complication following molar or ectopic pregnancy  H/O gastric bypass      Family history:  No family history of colon CA in first degree relatives      Social History: no smoking    ROS:   General:  No fevers, chills or night sweats.  ENT:  No sore throat or dysphagia  CV:  No pain or palpitations  Resp:  No dyspnea, cough, wheezing  GI:  as above  Skin:  No rash or edema      PHYSICAL EXAM:   GENERAL:  NAD, Appears stated age  HEENT:  NC/AT,  conjunctivae clear and pink, sclera -anicteric  CHEST:  CTA B/L, Normal effort  HEART:  RRR S1/S2, No murmurs  ABDOMEN:  Soft, non-tender, non-distended, normoactive bowel sounds,  no masses   EXTREMITIES:  No cyanosis or Edema  SKIN:  Warm & Dry. No rash or erythema  NEURO:  Alert, oriented  Rectal exam: no blood or melena     Vital Signs:  Vital Signs Last 24 Hrs  T(C): 37.1 (2019 01:45), Max: 37.9 (2019 17:24)  T(F): 98.8 (2019 01:45), Max: 100.3 (2019 17:24)  HR: 89 (2019 01:45) (86 - 119)  BP: 115/78 (2019 01:45) (115/78 - 146/90)  BP(mean): 104 (2019 20:35) (104 - 104)  RR: 18 (2019 01:45) (16 - 22)  SpO2: 97% (2019 01:45) (97% - 100%)  Daily Height in cm: 154.94 (2019 16:24)    Daily     LABS:                        8.8    6.54  )-----------( 323      ( 2019 19:49 )             28.2     Mean Cell Volume: 81.0 fl (19 @ 19:49)        141  |  102  |  4<L>  ----------------------------<  114<H>  3.0<L>   |  23  |  0.50    Ca    8.9      2019 06:39  Phos  3.3       Mg     2.1         TPro  9.4<H>  /  Alb  4.8  /  TBili  0.5  /  DBili  x   /  AST  63<H>  /  ALT  25  /  AlkPhos  107  11-25    LIVER FUNCTIONS - ( 2019 17:57 )  Alb: 4.8 g/dL / Pro: 9.4 g/dL / ALK PHOS: 107 U/L / ALT: 25 U/L / AST: 63 U/L / GGT: x           PT/INR - ( 2019 17:57 )   PT: 12.2 sec;   INR: 1.06 ratio         PTT - ( 2019 17:57 )  PTT:37.0 sec  Urinalysis Basic - ( 2019 00:24 )    Color: Yellow / Appearance: Clear / S.029 / pH: x  Gluc: x / Ketone: Large  / Bili: Small / Urobili: 4 mg/dL   Blood: x / Protein: 30 mg/dL / Nitrite: Negative   Leuk Esterase: Moderate / RBC: >50 /hpf / WBC 3-5   Sq Epi: x / Non Sq Epi: Moderate / Bacteria: Few                              8.8    6.54  )-----------( 323      ( 2019 19:49 )             28.2                         10.0   7.14  )-----------( 358      ( 2019 17:57 )             32.4     Imaging:

## 2019-11-26 NOTE — PROGRESS NOTE ADULT - ASSESSMENT
42yo F pmhx of fibromyalgia, anemia, gastric bypass in 2006 at [place], she was admitted to [place] surgery in 2015 with epigastric pain, found to have ulcer at the gastric staple line, managed with PPI, then discharged, presents here with one day of BRBPR. No history of constipation, she had hemorrhoids when she was pregnant but resolved, no surgeries for hemorrhoids, no chronic anal pain. She reports that after the BRBPR yesterday 1x episode, later in day she had a vaginal bleeding (Believes this is period, right time and has been "bleeding heavily with periods for 4 months". Feeling weaker more sob with bleeding. Does not take motrin, does not smoke.      Problem/Plan - 1:  ·  Problem: GIB (gastrointestinal bleeding).  Plan: Hemodynamically stable. GI and Colorectal help appreciated. EGD and Colonoscopy.      Problem/Plan - 2:  ·  Problem: Anemia due to blood loss.  Plan: Transfusing to keep Hgb>9 G .      Problem/Plan - 3:  ·  Problem:  Fever and chills.  Plan: Will check RVP ,Chest Xray noted  and cultures pending. No Leucocytosis .      Problem/Plan - 4:  ·  Problem: Back pain with radiculopathy.  Plan: H/O Surgeries and will f/u with her spinal surgeon.      Problem/Plan - 5:  ·  Problem: Fibromyalgia.  Plan: Home meds.      Problem/Plan - 6:  Problem: Obesity. Plan: H/O Gastric Bypass and Bariatric surgery follow up noted.

## 2019-11-26 NOTE — PROGRESS NOTE ADULT - SUBJECTIVE AND OBJECTIVE BOX
INTERVAL HPI/OVERNIGHT EVENTS: I feel better.   Vital Signs Last 24 Hrs  T(C): 36.9 (2019 18:36), Max: 37.9 (2019 20:35)  T(F): 98.4 (2019 18:36), Max: 100.3 (2019 20:35)  HR: 79 (2019 18:36) (77 - 95)  BP: 126/83 (2019 18:36) (115/78 - 146/90)  BP(mean): 104 (2019 20:35) (104 - 104)  RR: 18 (2019 18:36) (18 - 22)  SpO2: 99% (2019 18:36) (97% - 100%)  I&O's Summary    2019 07:01  -  2019 19:53  --------------------------------------------------------  IN: 120 mL / OUT: 0 mL / NET: 120 mL      MEDICATIONS  (STANDING):  dextrose 5% + sodium chloride 0.9% 1000 milliLiter(s) (100 mL/Hr) IV Continuous <Continuous>  influenza   Vaccine 0.5 milliLiter(s) IntraMuscular once  lidocaine   Patch 1 Patch Transdermal daily  pantoprazole  Injectable 40 milliGRAM(s) IV Push two times a day  polyethylene glycol/electrolyte Solution 1000 milliLiter(s) Oral every 4 hours  pregabalin 150 milliGRAM(s) Oral every 8 hours    MEDICATIONS  (PRN):  oxycodone    5 mG/acetaminophen 325 mG 2 Tablet(s) Oral every 6 hours PRN Moderate Pain (4 - 6)    LABS:                        9.5    5.77  )-----------( 287      ( 2019 09:17 )             30.9         141  |  102  |  4<L>  ----------------------------<  114<H>  3.0<L>   |  23  |  0.50    Ca    8.9      2019 06:39  Phos  3.3       Mg     2.1         TPro  9.4<H>  /  Alb  4.8  /  TBili  0.5  /  DBili  x   /  AST  63<H>  /  ALT  25  /  AlkPhos  107  11-25    PT/INR - ( 2019 17:57 )   PT: 12.2 sec;   INR: 1.06 ratio         PTT - ( 2019 17:57 )  PTT:37.0 sec  Urinalysis Basic - ( 2019 00:24 )    Color: Yellow / Appearance: Clear / S.029 / pH: x  Gluc: x / Ketone: Large  / Bili: Small / Urobili: 4 mg/dL   Blood: x / Protein: 30 mg/dL / Nitrite: Negative   Leuk Esterase: Moderate / RBC: >50 /hpf / WBC 3-5   Sq Epi: x / Non Sq Epi: Moderate / Bacteria: Few      CAPILLARY BLOOD GLUCOSE            Urinalysis Basic - ( 2019 00:24 )    Color: Yellow / Appearance: Clear / S.029 / pH: x  Gluc: x / Ketone: Large  / Bili: Small / Urobili: 4 mg/dL   Blood: x / Protein: 30 mg/dL / Nitrite: Negative   Leuk Esterase: Moderate / RBC: >50 /hpf / WBC 3-5   Sq Epi: x / Non Sq Epi: Moderate / Bacteria: Few      REVIEW OF SYSTEMS:  CONSTITUTIONAL: No fever, weight loss, or fatigue  EYES: No eye pain, visual disturbances, or discharge  ENMT:  No difficulty hearing, tinnitus, vertigo; No sinus or throat pain  NECK: No pain or stiffness  RESPIRATORY: No cough, wheezing, chills or hemoptysis; No shortness of breath  CARDIOVASCULAR: No chest pain, palpitations, dizziness, or leg swelling  GASTROINTESTINAL: No abdominal or epigastric pain. No nausea, vomiting, or hematemesis; No diarrhea or constipation. No melena or hematochezia.  GENITOURINARY: No dysuria, frequency, hematuria, or incontinence  NEUROLOGICAL: No headaches, memory loss, loss of strength, numbness, or tremors      Consultant(s) Notes Reviewed:  [x ] YES  [ ] NO    PHYSICAL EXAM:  GENERAL: NAD, well-groomed, well-developed, not in any distress ,  HEAD:  Atraumatic, Normocephalic  EYES: EOMI, PERRLA, conjunctiva and sclera clear  ENMT: No tonsillar erythema, exudates, or enlargement; Moist mucous membranes, Good dentition, No lesions  NECK: Supple, No JVD, Normal thyroid  NERVOUS SYSTEM:  Alert & Oriented X3, No focal deficit   CHEST/LUNG: Good air entry bilateral with no  rales, rhonchi, wheezing, or rubs  HEART: Regular rate and rhythm; No murmurs, rubs, or gallops  ABDOMEN: Soft, Nontender, Nondistended; Bowel sounds present  EXTREMITIES:  2+ Peripheral Pulses, No clubbing, cyanosis, or edema    Care Discussed with Consultants/Other Providers [ x] YES  [ ] NO

## 2019-11-26 NOTE — PROGRESS NOTE ADULT - SUBJECTIVE AND OBJECTIVE BOX
GENERAL SURGERY CONSULT PROGRESS NOTE    SUBJECTIVE  Patient seen and examined. episode of chest pain overnight, ACS workup pending. received PRBC x 1.   NPO without nausea, vomiting, passing flatus, having bowel movements, voiding without issues, have been ambulating and out of bed. Denies fever, chills, SOB, chest pain.       OBJECTIVE    PHYSICAL EXAM  General: Appears well, NAD  CHEST: breathing comfortably  CV: appears well perfused  Abdomen: soft, nontender, nondistended, no rebound or guarding, no rectal bleeding noted on exam  Extremities: Grossly symmetric    T(C): 37.1 (19 @ 01:45), Max: 37.9 (19 @ 17:24)  HR: 89 (19 @ 01:45) (86 - 119)  BP: 115/78 (19 @ 01:45) (115/78 - 146/90)  RR: 18 (19 @ 01:45) (16 - 22)  SpO2: 97% (19 @ 01:45) (97% - 100%)      MEDICATIONS  dextrose 5% + sodium chloride 0.9% 1000 milliLiter(s) IV Continuous <Continuous>  influenza   Vaccine 0.5 milliLiter(s) IntraMuscular once  oxycodone    5 mG/acetaminophen 325 mG 2 Tablet(s) Oral every 6 hours PRN  pantoprazole  Injectable 40 milliGRAM(s) IV Push two times a day  pregabalin 150 milliGRAM(s) Oral every 8 hours      LABS                        8.8    6.54  )-----------( 323      ( 2019 19:49 )             28.2         136  |  102  |  4<L>  ----------------------------<  113<H>  3.4<L>   |  20<L>  |  0.48<L>    Ca    9.4      2019 19:49    TPro  9.4<H>  /  Alb  4.8  /  TBili  0.5  /  DBili  x   /  AST  63<H>  /  ALT  25  /  AlkPhos  107      PT/INR - ( 2019 17:57 )   PT: 12.2 sec;   INR: 1.06 ratio         PTT - ( 2019 17:57 )  PTT:37.0 sec  Urinalysis Basic - ( 2019 00:24 )    Color: Yellow / Appearance: Clear / S.029 / pH: x  Gluc: x / Ketone: Large  / Bili: Small / Urobili: 4 mg/dL   Blood: x / Protein: 30 mg/dL / Nitrite: Negative   Leuk Esterase: Moderate / RBC: >50 /hpf / WBC 3-5   Sq Epi: x / Non Sq Epi: Moderate / Bacteria: Few        RADIOLOGY & ADDITIONAL STUDIES GENERAL SURGERY CONSULT PROGRESS NOTE    SUBJECTIVE  Patient seen and examined. episode of chest pain overnight, ACS workup pending. received PRBC x 1.   NPO without nausea, vomiting, passing flatus, no bowel movements since admission, voiding without issues, have been ambulating and out of bed. Denies fever, chills, SOB, chest pain.   c/o vaginal bleeding, denies rectal bleeding but has not had BM since admission    OBJECTIVE    PHYSICAL EXAM  General: Appears well, NAD  CHEST: breathing comfortably  CV: appears well perfused  Abdomen: soft, nontender, nondistended, no rebound or guarding, no rectal bleeding noted on exam  Extremities: Grossly symmetric    T(C): 37.1 (19 @ 01:45), Max: 37.9 (19 @ 17:24)  HR: 89 (19 @ :45) (86 - 119)  BP: 115/78 (19 @ 01:45) (115/78 - 146/90)  RR: 18 (19 @ 01:45) (16 - 22)  SpO2: 97% (19 @ 01:45) (97% - 100%)      MEDICATIONS  dextrose 5% + sodium chloride 0.9% 1000 milliLiter(s) IV Continuous <Continuous>  influenza   Vaccine 0.5 milliLiter(s) IntraMuscular once  oxycodone    5 mG/acetaminophen 325 mG 2 Tablet(s) Oral every 6 hours PRN  pantoprazole  Injectable 40 milliGRAM(s) IV Push two times a day  pregabalin 150 milliGRAM(s) Oral every 8 hours      LABS                        8.8    6.54  )-----------( 323      ( 2019 19:49 )             28.2         136  |  102  |  4<L>  ----------------------------<  113<H>  3.4<L>   |  20<L>  |  0.48<L>    Ca    9.4      2019 19:49    TPro  9.4<H>  /  Alb  4.8  /  TBili  0.5  /  DBili  x   /  AST  63<H>  /  ALT  25  /  AlkPhos  107      PT/INR - ( 2019 17:57 )   PT: 12.2 sec;   INR: 1.06 ratio         PTT - ( 2019 17:57 )  PTT:37.0 sec  Urinalysis Basic - ( 2019 00:24 )    Color: Yellow / Appearance: Clear / S.029 / pH: x  Gluc: x / Ketone: Large  / Bili: Small / Urobili: 4 mg/dL   Blood: x / Protein: 30 mg/dL / Nitrite: Negative   Leuk Esterase: Moderate / RBC: >50 /hpf / WBC 3-5   Sq Epi: x / Non Sq Epi: Moderate / Bacteria: Few        RADIOLOGY & ADDITIONAL STUDIES GENERAL SURGERY CONSULT PROGRESS NOTE    SUBJECTIVE  Patient seen and examined. episode of chest pain overnight, ACS workup pending. received PRBC x 1.   NPO without nausea, vomiting, passing flatus, no bowel movements since admission, voiding without issues, have been ambulating and out of bed. Denies fever, chills, SOB, chest pain.   c/o vaginal bleeding, denies rectal bleeding but has not had BM since admission    OBJECTIVE    PHYSICAL EXAM limited by patient in hallway  General: Appears well, NAD  CHEST: breathing comfortably  CV: appears well perfused  Abdomen: soft, nontender, nondistended, no rebound or guarding  Extremities: Grossly symmetric    T(C): 37.1 (19 @ 01:45), Max: 37.9 (19 @ 17:24)  HR: 89 (19 @ :45) (86 - 119)  BP: 115/78 (19 @ 01:45) (115/78 - 146/90)  RR: 18 (19 @ 01:45) (16 - 22)  SpO2: 97% (19 @ 01:45) (97% - 100%)      MEDICATIONS  dextrose 5% + sodium chloride 0.9% 1000 milliLiter(s) IV Continuous <Continuous>  influenza   Vaccine 0.5 milliLiter(s) IntraMuscular once  oxycodone    5 mG/acetaminophen 325 mG 2 Tablet(s) Oral every 6 hours PRN  pantoprazole  Injectable 40 milliGRAM(s) IV Push two times a day  pregabalin 150 milliGRAM(s) Oral every 8 hours      LABS                        8.8    6.54  )-----------( 323      ( 2019 19:49 )             28.2         136  |  102  |  4<L>  ----------------------------<  113<H>  3.4<L>   |  20<L>  |  0.48<L>    Ca    9.4      2019 19:49    TPro  9.4<H>  /  Alb  4.8  /  TBili  0.5  /  DBili  x   /  AST  63<H>  /  ALT  25  /  AlkPhos  107      PT/INR - ( 2019 17:57 )   PT: 12.2 sec;   INR: 1.06 ratio         PTT - ( 2019 17:57 )  PTT:37.0 sec  Urinalysis Basic - ( 2019 00:24 )    Color: Yellow / Appearance: Clear / S.029 / pH: x  Gluc: x / Ketone: Large  / Bili: Small / Urobili: 4 mg/dL   Blood: x / Protein: 30 mg/dL / Nitrite: Negative   Leuk Esterase: Moderate / RBC: >50 /hpf / WBC 3-5   Sq Epi: x / Non Sq Epi: Moderate / Bacteria: Few        RADIOLOGY & ADDITIONAL STUDIES

## 2019-11-26 NOTE — CONSULT NOTE ADULT - ASSESSMENT
43 year woman with medical history of fibromyalgia, anemia, gastric bypass in 2006 at [place], marginal ulcer in 2015( at the gastric staple line) presents here with one day of BRBPR.  Hgb dropped from 10 to 8.8    Impression:   # Hematochezia DDx: Hemorrhoidal bleed, diverticular bleed, colonic angioectasia, malignancy   # Acute on chronic anemia     Recommendations:  - Clear liquid diet for now  - Will tentatively plan for colonoscopy tomorrow.  - NPO after midnight.  - Continue to monitor h&h and transfuse if hgb drop below 7     - Iron studies 43 year woman with medical history of fibromyalgia, anemia, gastric bypass in 2006 at [place], marginal ulcer in 2015( at the gastric staple line) presents here with one day of BRBPR.  Hgb dropped from 10 to 8.8    Impression:   # Hematochezia DDx: Hemorrhoidal bleed, diverticular bleed, colonic angioectasia, malignancy   # Acute on chronic anemia     Recommendations:  - Clear liquid diet for now  - Will tentatively plan for colonoscopy + EGD tomorrow.  - NPO after midnight.  - Continue to monitor h&h and transfuse if hgb drop below 7     - Iron studies

## 2019-11-27 ENCOUNTER — RESULT REVIEW (OUTPATIENT)
Age: 44
End: 2019-11-27

## 2019-11-27 ENCOUNTER — TRANSCRIPTION ENCOUNTER (OUTPATIENT)
Age: 44
End: 2019-11-27

## 2019-11-27 LAB
ANION GAP SERPL CALC-SCNC: 11 MMOL/L — SIGNIFICANT CHANGE UP (ref 5–17)
BUN SERPL-MCNC: <4 MG/DL — LOW (ref 7–23)
CALCIUM SERPL-MCNC: 9.5 MG/DL — SIGNIFICANT CHANGE UP (ref 8.4–10.5)
CHLORIDE SERPL-SCNC: 106 MMOL/L — SIGNIFICANT CHANGE UP (ref 96–108)
CO2 SERPL-SCNC: 20 MMOL/L — LOW (ref 22–31)
CREAT SERPL-MCNC: 0.5 MG/DL — SIGNIFICANT CHANGE UP (ref 0.5–1.3)
GLUCOSE SERPL-MCNC: 119 MG/DL — HIGH (ref 70–99)
HCT VFR BLD CALC: 30.8 % — LOW (ref 34.5–45)
HGB BLD-MCNC: 9.5 G/DL — LOW (ref 11.5–15.5)
MCHC RBC-ENTMCNC: 25.7 PG — LOW (ref 27–34)
MCHC RBC-ENTMCNC: 30.8 GM/DL — LOW (ref 32–36)
MCV RBC AUTO: 83.5 FL — SIGNIFICANT CHANGE UP (ref 80–100)
NRBC # BLD: 0 /100 WBCS — SIGNIFICANT CHANGE UP (ref 0–0)
PLATELET # BLD AUTO: 304 K/UL — SIGNIFICANT CHANGE UP (ref 150–400)
POTASSIUM SERPL-MCNC: 4.5 MMOL/L — SIGNIFICANT CHANGE UP (ref 3.5–5.3)
POTASSIUM SERPL-SCNC: 4.5 MMOL/L — SIGNIFICANT CHANGE UP (ref 3.5–5.3)
RBC # BLD: 3.69 M/UL — LOW (ref 3.8–5.2)
RBC # FLD: 18.6 % — HIGH (ref 10.3–14.5)
SODIUM SERPL-SCNC: 137 MMOL/L — SIGNIFICANT CHANGE UP (ref 135–145)
WBC # BLD: 5.44 K/UL — SIGNIFICANT CHANGE UP (ref 3.8–10.5)
WBC # FLD AUTO: 5.44 K/UL — SIGNIFICANT CHANGE UP (ref 3.8–10.5)

## 2019-11-27 PROCEDURE — 88305 TISSUE EXAM BY PATHOLOGIST: CPT | Mod: 26

## 2019-11-27 PROCEDURE — 99231 SBSQ HOSP IP/OBS SF/LOW 25: CPT

## 2019-11-27 PROCEDURE — 88312 SPECIAL STAINS GROUP 1: CPT | Mod: 26

## 2019-11-27 PROCEDURE — 43239 EGD BIOPSY SINGLE/MULTIPLE: CPT | Mod: GC

## 2019-11-27 PROCEDURE — 45380 COLONOSCOPY AND BIOPSY: CPT | Mod: GC

## 2019-11-27 RX ORDER — IRON SUCROSE 20 MG/ML
200 INJECTION, SOLUTION INTRAVENOUS ONCE
Refills: 0 | Status: COMPLETED | OUTPATIENT
Start: 2019-11-27 | End: 2019-11-27

## 2019-11-27 RX ADMIN — LIDOCAINE 1 PATCH: 4 CREAM TOPICAL at 11:44

## 2019-11-27 RX ADMIN — OXYCODONE AND ACETAMINOPHEN 2 TABLET(S): 5; 325 TABLET ORAL at 00:10

## 2019-11-27 RX ADMIN — OXYCODONE AND ACETAMINOPHEN 2 TABLET(S): 5; 325 TABLET ORAL at 19:33

## 2019-11-27 RX ADMIN — Medication 100 MILLIGRAM(S): at 21:07

## 2019-11-27 RX ADMIN — SODIUM CHLORIDE 100 MILLILITER(S): 9 INJECTION, SOLUTION INTRAVENOUS at 05:40

## 2019-11-27 RX ADMIN — OXYCODONE AND ACETAMINOPHEN 2 TABLET(S): 5; 325 TABLET ORAL at 12:09

## 2019-11-27 RX ADMIN — Medication 150 MILLIGRAM(S): at 13:31

## 2019-11-27 RX ADMIN — PANTOPRAZOLE SODIUM 40 MILLIGRAM(S): 20 TABLET, DELAYED RELEASE ORAL at 05:39

## 2019-11-27 RX ADMIN — OXYCODONE AND ACETAMINOPHEN 2 TABLET(S): 5; 325 TABLET ORAL at 11:39

## 2019-11-27 RX ADMIN — Medication 100 MILLIGRAM(S): at 14:53

## 2019-11-27 RX ADMIN — LIDOCAINE 1 PATCH: 4 CREAM TOPICAL at 00:49

## 2019-11-27 RX ADMIN — Medication 150 MILLIGRAM(S): at 05:39

## 2019-11-27 RX ADMIN — Medication 150 MILLIGRAM(S): at 21:07

## 2019-11-27 RX ADMIN — IRON SUCROSE 110 MILLIGRAM(S): 20 INJECTION, SOLUTION INTRAVENOUS at 21:07

## 2019-11-27 RX ADMIN — OXYCODONE AND ACETAMINOPHEN 2 TABLET(S): 5; 325 TABLET ORAL at 20:15

## 2019-11-27 RX ADMIN — PANTOPRAZOLE SODIUM 40 MILLIGRAM(S): 20 TABLET, DELAYED RELEASE ORAL at 18:45

## 2019-11-27 NOTE — DISCHARGE NOTE NURSING/CASE MANAGEMENT/SOCIAL WORK - PATIENT PORTAL LINK FT
You can access the FollowMyHealth Patient Portal offered by [facility] by registering at the following website: [email or web address]. By joining [facility]’s FollowMyHealth portal, you will also be able to view your health information using other applications (apps) compatible with our system.

## 2019-11-27 NOTE — PROGRESS NOTE ADULT - SUBJECTIVE AND OBJECTIVE BOX
GENERAL SURGERY CONSULT PROGRESS NOTE    SUBJECTIVE  Patient seen and examined. NPO without nausea, vomiting, passing flatus, no bowel movements since admission, voiding without issues, have been ambulating and out of bed. Denies fever, chills, SOB, chest pain.   c/o vaginal bleeding, denies rectal bleeding but has not had BM since admission    OBJECTIVE    PHYSICAL EXAM   General: Appears well, NAD  CHEST: breathing comfortably  CV: appears well perfused  Abdomen: soft, nontender, nondistended, no rebound or guarding  Extremities: Grossly symmetric            Vital Signs:  Vital Signs Last 24 Hrs  T(C): 36.6 (27 Nov 2019 04:30), Max: 36.9 (26 Nov 2019 18:36)  T(F): 97.8 (27 Nov 2019 04:30), Max: 98.4 (26 Nov 2019 18:36)  HR: 91 (27 Nov 2019 04:30) (77 - 91)  BP: 124/83 (27 Nov 2019 04:30) (115/80 - 138/92)  BP(mean): --  RR: 18 (27 Nov 2019 04:30) (17 - 18)  SpO2: 97% (27 Nov 2019 04:30) (97% - 99%)    CAPILLARY BLOOD GLUCOSE          I&O's Detail    26 Nov 2019 07:01  -  27 Nov 2019 04:38  --------------------------------------------------------  IN:    Oral Fluid: 120 mL  Total IN: 120 mL    OUT:  Total OUT: 0 mL    Total NET: 120 mL          MEDICATIONS  (STANDING):  dextrose 5% + sodium chloride 0.9% 1000 milliLiter(s) (100 mL/Hr) IV Continuous <Continuous>  influenza   Vaccine 0.5 milliLiter(s) IntraMuscular once  lidocaine   Patch 1 Patch Transdermal daily  pantoprazole  Injectable 40 milliGRAM(s) IV Push two times a day  pregabalin 150 milliGRAM(s) Oral every 8 hours    MEDICATIONS  (PRN):  oxycodone    5 mG/acetaminophen 325 mG 2 Tablet(s) Oral every 6 hours PRN Moderate Pain (4 - 6)          Labs:    11-27    137  |  106  |  <4<L>  ----------------------------<  119<H>  4.5   |  20<L>  |  0.50    Ca    9.5      27 Nov 2019 01:22  Phos  3.3     11-26  Mg     2.1     11-26    TPro  9.4<H>  /  Alb  4.8  /  TBili  0.5  /  DBili  x   /  AST  63<H>  /  ALT  25  /  AlkPhos  107  11-25    LIVER FUNCTIONS - ( 25 Nov 2019 17:57 )  Alb: 4.8 g/dL / Pro: 9.4 g/dL / ALK PHOS: 107 U/L / ALT: 25 U/L / AST: 63 U/L / GGT: x                                 9.5    5.44  )-----------( 304      ( 27 Nov 2019 01:22 )             30.8     PT/INR - ( 25 Nov 2019 17:57 )   PT: 12.2 sec;   INR: 1.06 ratio         PTT - ( 25 Nov 2019 17:57 )  PTT:37.0 sec    Imaging: GENERAL SURGERY CONSULT PROGRESS NOTE    SUBJECTIVE  Patient seen and examined. NPO without nausea, vomiting, passing flatus, no bowel movements since admission, voiding without issues, have been ambulating and out of bed. Denies fever, chills, SOB, chest pain.   c/o vaginal bleeding that has improved, denies rectal bleeding     OBJECTIVE    PHYSICAL EXAM   General: Appears well, NAD  CHEST: breathing comfortably  CV: appears well perfused  Abdomen: soft, nontender, nondistended, no rebound or guarding  Extremities: Grossly symmetric            Vital Signs:  Vital Signs Last 24 Hrs  T(C): 36.6 (27 Nov 2019 04:30), Max: 36.9 (26 Nov 2019 18:36)  T(F): 97.8 (27 Nov 2019 04:30), Max: 98.4 (26 Nov 2019 18:36)  HR: 91 (27 Nov 2019 04:30) (77 - 91)  BP: 124/83 (27 Nov 2019 04:30) (115/80 - 138/92)  BP(mean): --  RR: 18 (27 Nov 2019 04:30) (17 - 18)  SpO2: 97% (27 Nov 2019 04:30) (97% - 99%)    CAPILLARY BLOOD GLUCOSE          I&O's Detail    26 Nov 2019 07:01  -  27 Nov 2019 04:38  --------------------------------------------------------  IN:    Oral Fluid: 120 mL  Total IN: 120 mL    OUT:  Total OUT: 0 mL    Total NET: 120 mL          MEDICATIONS  (STANDING):  dextrose 5% + sodium chloride 0.9% 1000 milliLiter(s) (100 mL/Hr) IV Continuous <Continuous>  influenza   Vaccine 0.5 milliLiter(s) IntraMuscular once  lidocaine   Patch 1 Patch Transdermal daily  pantoprazole  Injectable 40 milliGRAM(s) IV Push two times a day  pregabalin 150 milliGRAM(s) Oral every 8 hours    MEDICATIONS  (PRN):  oxycodone    5 mG/acetaminophen 325 mG 2 Tablet(s) Oral every 6 hours PRN Moderate Pain (4 - 6)          Labs:    11-27    137  |  106  |  <4<L>  ----------------------------<  119<H>  4.5   |  20<L>  |  0.50    Ca    9.5      27 Nov 2019 01:22  Phos  3.3     11-26  Mg     2.1     11-26    TPro  9.4<H>  /  Alb  4.8  /  TBili  0.5  /  DBili  x   /  AST  63<H>  /  ALT  25  /  AlkPhos  107  11-25    LIVER FUNCTIONS - ( 25 Nov 2019 17:57 )  Alb: 4.8 g/dL / Pro: 9.4 g/dL / ALK PHOS: 107 U/L / ALT: 25 U/L / AST: 63 U/L / GGT: x                                 9.5    5.44  )-----------( 304      ( 27 Nov 2019 01:22 )             30.8     PT/INR - ( 25 Nov 2019 17:57 )   PT: 12.2 sec;   INR: 1.06 ratio         PTT - ( 25 Nov 2019 17:57 )  PTT:37.0 sec    Imaging:

## 2019-11-27 NOTE — DISCHARGE NOTE NURSING/CASE MANAGEMENT/SOCIAL WORK - NSDCVIVACCINE_GEN_ALL_CORE_FT
Influenza , 2016/4/7 12:49 , Vicky Dhaliwal (RN) Influenza , 2016/4/7 12:49 , Vicky Dhaliwal (RN)  Influenza , 2019/12/13 17:27 , Priscilla Vale (RN)

## 2019-11-27 NOTE — PROGRESS NOTE ADULT - SUBJECTIVE AND OBJECTIVE BOX
Pre-Endoscopy Evaluation      Referring Physician: dr. dennis alonzo                                 Procedure:  upper gastrointestinal endoscopy/colonoscopy    Indication for Procedure: anemia, gib    Pertinent History: 43 year woman with medical history of fibromyalgia, anemia, gastric bypass in 2006 at [place], marginal ulcer in 2015( at the gastric staple line) BRBPR.      Sedation by Anesthesia [X]    PAST MEDICAL & SURGICAL HISTORY:  Lupus  Anemia  GI bleed  Joint effusion  Herniated disc, cervical: c6-c7  Lumbar disc herniation: L4-L5, BULGING DISC AT L5-S1  Polyarthralgia  Fibromyalgia  Ectopic pregnancy: 2, both ruptured  Ovarian cyst rupture  H/O abdominoplasty  Internal hernia: s/p repair - 2011  Complication following molar or ectopic pregnancy: bilateral salpingectomy  H/O gastric bypass: June 2009  Marginal Ulcer      PMH of Gastroparesis [ ]  Gastric Surgery [X]  Gastric Outlet Obstruction [ ]    Allergies    IV Contrast (Hives)  shellfish (Hives; Rash)    Intolerances:    Latex allergy: [ ] yes [X] no    Medications:MEDICATIONS  (STANDING):  dextrose 5% + sodium chloride 0.9% 1000 milliLiter(s) (100 mL/Hr) IV Continuous <Continuous>  influenza   Vaccine 0.5 milliLiter(s) IntraMuscular once  lidocaine   Patch 1 Patch Transdermal daily  pantoprazole  Injectable 40 milliGRAM(s) IV Push two times a day  pregabalin 150 milliGRAM(s) Oral every 8 hours    MEDICATIONS  (PRN):  oxycodone    5 mG/acetaminophen 325 mG 2 Tablet(s) Oral every 6 hours PRN Moderate Pain (4 - 6)      Smoking: [ ] yes  [x] no    AICD/PPM: [ ] yes   [x] no    Pertinent lab data:                        9.5    5.44  )-----------( 304      ( 27 Nov 2019 01:22 )             30.8     11-27    137  |  106  |  <4<L>  ----------------------------<  119<H>  4.5   |  20<L>  |  0.50    Ca    9.5      27 Nov 2019 01:22  Phos  3.3     11-26  Mg     2.1     11-26    TPro  9.4<H>  /  Alb  4.8  /  TBili  0.5  /  DBili  x   /  AST  63<H>  /  ALT  25  /  AlkPhos  107  11-25    PT/INR - ( 25 Nov 2019 17:57 )   PT: 12.2 sec;   INR: 1.06 ratio         PTT - ( 25 Nov 2019 17:57 )  PTT:37.0 sec  CARDIAC MARKERS ( 26 Nov 2019 06:39 )  x     / x     / 232 U/L / x     / 1.2 ng/mL  CARDIAC MARKERS ( 25 Nov 2019 23:19 )  x     / x     / 318 U/L / x     / 1.5 ng/mL        HCG Quantitative, Serum: <2.0 mIU/mL (11-25 @ 17:57)      Physical Examination:   Daily   Vital Signs Last 24 Hrs  T(C): 36.6 (27 Nov 2019 04:30), Max: 36.9 (26 Nov 2019 18:36)  T(F): 97.8 (27 Nov 2019 04:30), Max: 98.4 (26 Nov 2019 18:36)  HR: 91 (27 Nov 2019 04:30) (77 - 91)  BP: 124/83 (27 Nov 2019 04:30) (115/80 - 138/92)  BP(mean): --  RR: 18 (27 Nov 2019 04:30) (17 - 18)  SpO2: 97% (27 Nov 2019 04:30) (97% - 99%)      Drug Dosing Weight  Height (cm): 154.94 (25 Nov 2019 16:24)  Weight (kg): 104.3 (25 Nov 2019 16:24)  BMI (kg/m2): 43.4 (25 Nov 2019 16:24)  BSA (m2): 2 (25 Nov 2019 16:24)    Constitutional: NAD     Neck:  No JVD    Respiratory: CTAB/L    Cardiovascular: S1 and S2    Gastrointestinal: BS+, soft, NT/ND    Extremities: No peripheral edema    Neurological: A/O x 3    : No Nguyen    Skin: No rashes    Comments:    The patient is a suitable candidate for the planned procedure unless box checked [ ]  No, explain:

## 2019-11-27 NOTE — PROGRESS NOTE ADULT - SUBJECTIVE AND OBJECTIVE BOX
INTERVAL HPI/OVERNIGHT EVENTS: No new concerns.   Vital Signs Last 24 Hrs  T(C): 36.9 (2019 14:11), Max: 36.9 (2019 14:11)  T(F): 98.5 (2019 14:11), Max: 98.5 (2019 14:11)  HR: 98 (2019 14:11) (85 - 98)  BP: 114/77 (2019 14:11) (114/77 - 138/92)  BP(mean): --  RR: 18 (2019 14:11) (17 - 18)  SpO2: 97% (2019 14:11) (97% - 98%)  I&O's Summary    2019 07:  -  2019 07:00  --------------------------------------------------------  IN: 320 mL / OUT: 0 mL / NET: 320 mL    2019 07:01  -  2019 19:06  --------------------------------------------------------  IN: 240 mL / OUT: 0 mL / NET: 240 mL      MEDICATIONS  (STANDING):  dextrose 5% + sodium chloride 0.9% 1000 milliLiter(s) (100 mL/Hr) IV Continuous <Continuous>  influenza   Vaccine 0.5 milliLiter(s) IntraMuscular once  iron sucrose IVPB 200 milliGRAM(s) IV Intermittent once  lidocaine   Patch 1 Patch Transdermal daily  pantoprazole  Injectable 40 milliGRAM(s) IV Push two times a day  pregabalin 150 milliGRAM(s) Oral every 8 hours    MEDICATIONS  (PRN):  guaiFENesin   Syrup  (Sugar-Free) 100 milliGRAM(s) Oral every 6 hours PRN Cough  oxycodone    5 mG/acetaminophen 325 mG 2 Tablet(s) Oral every 6 hours PRN Moderate Pain (4 - 6)    LABS:                        9.5    5.44  )-----------( 304      ( 2019 01:22 )             30.8     11    137  |  106  |  <4<L>  ----------------------------<  119<H>  4.5   |  20<L>  |  0.50    Ca    9.5      2019 01:22  Phos  3.3       Mg     2.1             Urinalysis Basic - ( 2019 00:24 )    Color: Yellow / Appearance: Clear / S.029 / pH: x  Gluc: x / Ketone: Large  / Bili: Small / Urobili: 4 mg/dL   Blood: x / Protein: 30 mg/dL / Nitrite: Negative   Leuk Esterase: Moderate / RBC: >50 /hpf / WBC 3-5   Sq Epi: x / Non Sq Epi: Moderate / Bacteria: Few      CAPILLARY BLOOD GLUCOSE            Urinalysis Basic - ( 2019 00:24 )    Color: Yellow / Appearance: Clear / S.029 / pH: x  Gluc: x / Ketone: Large  / Bili: Small / Urobili: 4 mg/dL   Blood: x / Protein: 30 mg/dL / Nitrite: Negative   Leuk Esterase: Moderate / RBC: >50 /hpf / WBC 3-5   Sq Epi: x / Non Sq Epi: Moderate / Bacteria: Few      REVIEW OF SYSTEMS:  CONSTITUTIONAL: No fever, weight loss, or fatigue  EYES: No eye pain, visual disturbances, or discharge  ENMT:  No difficulty hearing, tinnitus, vertigo; No sinus or throat pain  NECK: No pain or stiffness  RESPIRATORY: No cough, wheezing, chills or hemoptysis; No shortness of breath  CARDIOVASCULAR: No chest pain, palpitations, dizziness, or leg swelling  GASTROINTESTINAL: No abdominal or epigastric pain. No nausea, vomiting, or hematemesis; No diarrhea or constipation. No melena or hematochezia.  GENITOURINARY: No dysuria, frequency, hematuria, or incontinence  NEUROLOGICAL: No headaches, memory loss, loss of strength, numbness, or tremors    RADIOLOGY & ADDITIONAL TESTS:    Consultant(s) Notes Reviewed:  [x ] YES  [ ] NO    PHYSICAL EXAM:  GENERAL: NAD, well-groomed, well-developed,not in any distress ,  HEAD:  Atraumatic, Normocephalic  EYES: EOMI, PERRLA, conjunctiva and sclera clear  ENMT: No tonsillar erythema, exudates, or enlargement; Moist mucous membranes, Good dentition, No lesions  NECK: Supple, No JVD, Normal thyroid  NERVOUS SYSTEM:  Alert & Oriented X3, No focal deficit   CHEST/LUNG: Good air entry bilateral with no  rales, rhonchi, wheezing, or rubs  HEART: Regular rate and rhythm; No murmurs, rubs, or gallops  ABDOMEN: Soft, Nontender, Nondistended; Bowel sounds present  EXTREMITIES:  2+ Peripheral Pulses, No clubbing, cyanosis, or edema  SKIN: No rashes or lesions    Care Discussed with Consultants/Other Providers [ x] YES  [ ] NO

## 2019-11-27 NOTE — PROGRESS NOTE ADULT - ASSESSMENT
42yo F pmhx of fibromyalgia, anemia, gastric bypass in 2006 at [place], she was admitted to [facility] [place] surgery in 2015 with epigastric pain, found to have ulcer at the gastric staple line, managed with PPI, then discharged, presents here with one day of BRBPR. No history of constipation, she had hemorrhoids when she was pregnant but resolved, no surgeries for hemorrhoids, no chronic anal pain. She reports that after the BRBPR yesterday 1x episode, later in day she had a vaginal bleeding (Believes this is period, right time and has been "bleeding heavily with periods for 4 months". Feeling weaker more sob with bleeding. Does not take motrin, does not smoke.      Problem/Plan - 1:  ·  Problem: GIB (gastrointestinal bleeding).  Plan: Hemodynamically stable. GI and Colorectal help appreciated. S/P EGD and Colonoscopy.      Problem/Plan - 2:  ·  Problem: Anemia due to blood loss.  Plan: Transfusing to keep Hgb>9 G . IV iron and checking AM labs.      Problem/Plan - 3:  ·  Problem:  Fever and chills.  Plan: Will check RVP ,Chest Xray noted  and cultures pending. No Leucocytosis .      Problem/Plan - 4:  ·  Problem: Back pain with radiculopathy.  Plan: H/O Surgeries and will f/u with her spinal surgeon.      Problem/Plan - 5:  ·  Problem: Fibromyalgia.  Plan: Home meds.      Problem/Plan - 6:  Problem: Obesity. Plan: H/O Gastric Bypass and Bariatric surgery follow up noted.

## 2019-11-27 NOTE — PROGRESS NOTE ADULT - ASSESSMENT
Assessment: 42yo F pmhx of fibromyalgia, anemia, gastric bypass in 2006 at [place], she was admitted to [facility] in 2015 with epigastric pain, found to have ulcer at the gastric staple line, here with BRBPR yesterday , hemodynamically stable.     - GI for upper and lower endoscopy   - recommend Protonix BID  - NPO for scpoe  - IVF  - Care per primary team           [facility]  [id number] Assessment: 44yo F pmhx of fibromyalgia, anemia, gastric bypass in 2006 at [place], she was admitted to [facility] in 2015 with epigastric pain, found to have ulcer at the gastric staple line, here with BRBPR yesterday , hemodynamically stable.     - GI for upper and lower endoscopy   - recommend Protonix BID  - NPO for scpoe  - IVF  - Care per primary team      [facility]  [id number]    Addendum  Agree with resident note.  No further bleeding per rectum.  Continue to follow H&H. Continue PPI.  Meaghan Melissa MD

## 2019-11-27 NOTE — DISCHARGE NOTE NURSING/CASE MANAGEMENT/SOCIAL WORK - NSDCPEWEB_GEN_ALL_CORE
[facility] website --- [email or web address] website --- [email or web address]

## 2019-11-28 LAB
ANION GAP SERPL CALC-SCNC: 13 MMOL/L — SIGNIFICANT CHANGE UP (ref 5–17)
BUN SERPL-MCNC: 8 MG/DL — SIGNIFICANT CHANGE UP (ref 7–23)
CALCIUM SERPL-MCNC: 9.3 MG/DL — SIGNIFICANT CHANGE UP (ref 8.4–10.5)
CHLORIDE SERPL-SCNC: 104 MMOL/L — SIGNIFICANT CHANGE UP (ref 96–108)
CO2 SERPL-SCNC: 21 MMOL/L — LOW (ref 22–31)
CREAT SERPL-MCNC: 0.52 MG/DL — SIGNIFICANT CHANGE UP (ref 0.5–1.3)
FERRITIN SERPL-MCNC: 34 NG/ML — SIGNIFICANT CHANGE UP (ref 15–150)
FOLATE SERPL-MCNC: 14.4 NG/ML — SIGNIFICANT CHANGE UP
GLUCOSE SERPL-MCNC: 117 MG/DL — HIGH (ref 70–99)
HCT VFR BLD CALC: 29.8 % — LOW (ref 34.5–45)
HGB BLD-MCNC: 9.4 G/DL — LOW (ref 11.5–15.5)
IRON SATN MFR SERPL: 399 UG/DL — HIGH (ref 30–160)
IRON SATN MFR SERPL: 82 % — HIGH (ref 14–50)
MCHC RBC-ENTMCNC: 26.6 PG — LOW (ref 27–34)
MCHC RBC-ENTMCNC: 31.5 GM/DL — LOW (ref 32–36)
MCV RBC AUTO: 84.2 FL — SIGNIFICANT CHANGE UP (ref 80–100)
PLATELET # BLD AUTO: 333 K/UL — SIGNIFICANT CHANGE UP (ref 150–400)
POTASSIUM SERPL-MCNC: 3.3 MMOL/L — LOW (ref 3.5–5.3)
POTASSIUM SERPL-SCNC: 3.3 MMOL/L — LOW (ref 3.5–5.3)
RBC # BLD: 3.54 M/UL — LOW (ref 3.8–5.2)
RBC # FLD: 19.5 % — HIGH (ref 10.3–14.5)
SODIUM SERPL-SCNC: 138 MMOL/L — SIGNIFICANT CHANGE UP (ref 135–145)
TIBC SERPL-MCNC: 488 UG/DL — HIGH (ref 220–430)
UIBC SERPL-MCNC: 89 UG/DL — LOW (ref 110–370)
VIT B12 SERPL-MCNC: 263 PG/ML — SIGNIFICANT CHANGE UP (ref 232–1245)
WBC # BLD: 7.99 K/UL — SIGNIFICANT CHANGE UP (ref 3.8–10.5)
WBC # FLD AUTO: 7.99 K/UL — SIGNIFICANT CHANGE UP (ref 3.8–10.5)

## 2019-11-28 PROCEDURE — 71250 CT THORAX DX C-: CPT | Mod: 26

## 2019-11-28 RX ORDER — IRON SUCROSE 20 MG/ML
200 INJECTION, SOLUTION INTRAVENOUS EVERY 24 HOURS
Refills: 0 | Status: COMPLETED | OUTPATIENT
Start: 2019-11-28 | End: 2019-11-29

## 2019-11-28 RX ORDER — POTASSIUM CHLORIDE 20 MEQ
40 PACKET (EA) ORAL ONCE
Refills: 0 | Status: COMPLETED | OUTPATIENT
Start: 2019-11-28 | End: 2019-11-28

## 2019-11-28 RX ADMIN — Medication 100 MILLIGRAM(S): at 13:46

## 2019-11-28 RX ADMIN — OXYCODONE AND ACETAMINOPHEN 2 TABLET(S): 5; 325 TABLET ORAL at 21:04

## 2019-11-28 RX ADMIN — LIDOCAINE 1 PATCH: 4 CREAM TOPICAL at 00:00

## 2019-11-28 RX ADMIN — IRON SUCROSE 110 MILLIGRAM(S): 20 INJECTION, SOLUTION INTRAVENOUS at 18:13

## 2019-11-28 RX ADMIN — OXYCODONE AND ACETAMINOPHEN 2 TABLET(S): 5; 325 TABLET ORAL at 21:34

## 2019-11-28 RX ADMIN — Medication 150 MILLIGRAM(S): at 13:05

## 2019-11-28 RX ADMIN — OXYCODONE AND ACETAMINOPHEN 2 TABLET(S): 5; 325 TABLET ORAL at 14:49

## 2019-11-28 RX ADMIN — Medication 100 MILLIGRAM(S): at 21:42

## 2019-11-28 RX ADMIN — SODIUM CHLORIDE 100 MILLILITER(S): 9 INJECTION, SOLUTION INTRAVENOUS at 08:46

## 2019-11-28 RX ADMIN — OXYCODONE AND ACETAMINOPHEN 2 TABLET(S): 5; 325 TABLET ORAL at 02:21

## 2019-11-28 RX ADMIN — OXYCODONE AND ACETAMINOPHEN 2 TABLET(S): 5; 325 TABLET ORAL at 09:16

## 2019-11-28 RX ADMIN — SODIUM CHLORIDE 100 MILLILITER(S): 9 INJECTION, SOLUTION INTRAVENOUS at 05:15

## 2019-11-28 RX ADMIN — LIDOCAINE 1 PATCH: 4 CREAM TOPICAL at 13:01

## 2019-11-28 RX ADMIN — PANTOPRAZOLE SODIUM 40 MILLIGRAM(S): 20 TABLET, DELAYED RELEASE ORAL at 05:15

## 2019-11-28 RX ADMIN — Medication 100 MILLIGRAM(S): at 21:43

## 2019-11-28 RX ADMIN — OXYCODONE AND ACETAMINOPHEN 2 TABLET(S): 5; 325 TABLET ORAL at 03:00

## 2019-11-28 RX ADMIN — Medication 100 MILLIGRAM(S): at 04:33

## 2019-11-28 RX ADMIN — Medication 150 MILLIGRAM(S): at 05:15

## 2019-11-28 RX ADMIN — Medication 150 MILLIGRAM(S): at 21:42

## 2019-11-28 RX ADMIN — PANTOPRAZOLE SODIUM 40 MILLIGRAM(S): 20 TABLET, DELAYED RELEASE ORAL at 18:14

## 2019-11-28 RX ADMIN — Medication 40 MILLIEQUIVALENT(S): at 08:41

## 2019-11-28 RX ADMIN — OXYCODONE AND ACETAMINOPHEN 2 TABLET(S): 5; 325 TABLET ORAL at 08:46

## 2019-11-28 RX ADMIN — OXYCODONE AND ACETAMINOPHEN 2 TABLET(S): 5; 325 TABLET ORAL at 15:19

## 2019-11-28 RX ADMIN — LIDOCAINE 1 PATCH: 4 CREAM TOPICAL at 19:33

## 2019-11-28 NOTE — PROGRESS NOTE ADULT - ASSESSMENT
42yo F pmhx of fibromyalgia, anemia, gastric bypass in 2006 at [place], she was admitted to [place] surgery in 2015 with epigastric pain, found to have ulcer at the gastric staple line, managed with PPI, then discharged, presents here with one day of BRBPR. No history of constipation, she had hemorrhoids when she was pregnant but resolved, no surgeries for hemorrhoids, no chronic anal pain. She reports that after the BRBPR yesterday 1x episode, later in day she had a vaginal bleeding (Believes this is period, right time and has been "bleeding heavily with periods for 4 months". Feeling weaker more sob with bleeding. Does not take motrin, does not smoke.      Problem/Plan - 1:  ·  Problem: GIB (gastrointestinal bleeding).  Plan: Hemodynamically stable. GI and Colorectal help appreciated. S/P EGD and Colonoscopy.      Problem/Plan - 2:  ·  Problem: Anemia due to blood loss.  Plan: Transfusing to keep Hgb>9 G . IV iron and checking AM labs.      Problem/Plan - 3:  ·  Problem:  Fever and chills with persistent cough .  Plan: RVP ,Chest Xray noted  and cultures noted.  No Leucocytosis . CT chest pending.      Problem/Plan - 4:  ·  Problem: Back pain with radiculopathy.  Plan: H/O Surgeries and will f/u with her spinal surgeon.      Problem/Plan - 5:  ·  Problem: Fibromyalgia.  Plan: Home meds.      Problem/Plan - 6:  Problem: Obesity. Plan: H/O Gastric Bypass and Bariatric surgery follow up noted.

## 2019-11-28 NOTE — PROGRESS NOTE ADULT - SUBJECTIVE AND OBJECTIVE BOX
INTERVAL HPI/OVERNIGHT EVENTS: 3 weeks of persistent cough   Vital Signs Last 24 Hrs  T(C): 36.6 (28 Nov 2019 11:40), Max: 37.1 (27 Nov 2019 21:12)  T(F): 97.8 (28 Nov 2019 11:40), Max: 98.7 (27 Nov 2019 21:12)  HR: 85 (28 Nov 2019 11:40) (84 - 88)  BP: 127/88 (28 Nov 2019 11:40) (124/78 - 152/91)  BP(mean): --  RR: 18 (28 Nov 2019 11:40) (18 - 18)  SpO2: 97% (28 Nov 2019 11:40) (96% - 98%)  I&O's Summary    27 Nov 2019 07:01  -  28 Nov 2019 07:00  --------------------------------------------------------  IN: 540 mL / OUT: 0 mL / NET: 540 mL    28 Nov 2019 07:01  -  28 Nov 2019 15:05  --------------------------------------------------------  IN: 480 mL / OUT: 0 mL / NET: 480 mL      MEDICATIONS  (STANDING):  benzonatate 100 milliGRAM(s) Oral three times a day  dextrose 5% + sodium chloride 0.9% 1000 milliLiter(s) (100 mL/Hr) IV Continuous <Continuous>  influenza   Vaccine 0.5 milliLiter(s) IntraMuscular once  iron sucrose IVPB 200 milliGRAM(s) IV Intermittent every 24 hours  lidocaine   Patch 1 Patch Transdermal daily  pantoprazole  Injectable 40 milliGRAM(s) IV Push two times a day  pregabalin 150 milliGRAM(s) Oral every 8 hours    MEDICATIONS  (PRN):  guaiFENesin   Syrup  (Sugar-Free) 100 milliGRAM(s) Oral every 6 hours PRN Cough  oxycodone    5 mG/acetaminophen 325 mG 2 Tablet(s) Oral every 6 hours PRN Moderate Pain (4 - 6)    LABS:                        9.4    7.99  )-----------( 333      ( 28 Nov 2019 08:51 )             29.8     11-28    138  |  104  |  8   ----------------------------<  117<H>  3.3<L>   |  21<L>  |  0.52    Ca    9.3      28 Nov 2019 06:48          CAPILLARY BLOOD GLUCOSE              REVIEW OF SYSTEMS:  CONSTITUTIONAL: No fever, weight loss, or fatigue  EYES: No eye pain, visual disturbances, or discharge  ENMT:  No difficulty hearing, tinnitus, vertigo; No sinus or throat pain  NECK: No pain or stiffness  BREASTS: No pain, masses, or nipple discharge  RESPIRATORY: cough, but no wheezing, chills or hemoptysis; No shortness of breath  CARDIOVASCULAR: No chest pain, palpitations, dizziness, or leg swelling  GASTROINTESTINAL: No abdominal or epigastric pain. No nausea, vomiting, or hematemesis; No diarrhea or constipation. No melena or hematochezia.  GENITOURINARY: No dysuria, frequency, hematuria, or incontinence  NEUROLOGICAL: No headaches, memory loss, loss of strength, numbness, or tremors    Consultant(s) Notes Reviewed:  [x ] YES  [ ] NO    PHYSICAL EXAM:  GENERAL: NAD, well-groomed, well-developed, not in any distress ,  HEAD:  Atraumatic, Normocephalic  EYES: EOMI, PERRLA, conjunctiva and sclera clear  ENMT: No tonsillar erythema, exudates, or enlargement; Moist mucous membranes, Good dentition, No lesions  NECK: Supple, No JVD, Normal thyroid  NERVOUS SYSTEM:  Alert & Oriented X3, No focal deficit   CHEST/LUNG: Good air entry bilateral with no  rales, rhonchi, wheezing, or rubs  HEART: Regular rate and rhythm; No murmurs, rubs, or gallops  ABDOMEN: Soft, Nontender, Nondistended; Bowel sounds present  EXTREMITIES:  2+ Peripheral Pulses, No clubbing, cyanosis, or edema  SKIN: No rashes or lesions    Care Discussed with Consultants/Other Providers [ x] YES  [ ] NO

## 2019-11-29 LAB
ANION GAP SERPL CALC-SCNC: 13 MMOL/L — SIGNIFICANT CHANGE UP (ref 5–17)
BUN SERPL-MCNC: 6 MG/DL — LOW (ref 7–23)
CALCIUM SERPL-MCNC: 9.2 MG/DL — SIGNIFICANT CHANGE UP (ref 8.4–10.5)
CHLORIDE SERPL-SCNC: 106 MMOL/L — SIGNIFICANT CHANGE UP (ref 96–108)
CO2 SERPL-SCNC: 22 MMOL/L — SIGNIFICANT CHANGE UP (ref 22–31)
CREAT SERPL-MCNC: 0.51 MG/DL — SIGNIFICANT CHANGE UP (ref 0.5–1.3)
GLUCOSE SERPL-MCNC: 93 MG/DL — SIGNIFICANT CHANGE UP (ref 70–99)
HCT VFR BLD CALC: 30.7 % — LOW (ref 34.5–45)
HGB BLD-MCNC: 9.3 G/DL — LOW (ref 11.5–15.5)
MCHC RBC-ENTMCNC: 25.8 PG — LOW (ref 27–34)
MCHC RBC-ENTMCNC: 30.3 GM/DL — LOW (ref 32–36)
MCV RBC AUTO: 85.3 FL — SIGNIFICANT CHANGE UP (ref 80–100)
PLATELET # BLD AUTO: 359 K/UL — SIGNIFICANT CHANGE UP (ref 150–400)
POTASSIUM SERPL-MCNC: 3.6 MMOL/L — SIGNIFICANT CHANGE UP (ref 3.5–5.3)
POTASSIUM SERPL-SCNC: 3.6 MMOL/L — SIGNIFICANT CHANGE UP (ref 3.5–5.3)
RBC # BLD: 3.6 M/UL — LOW (ref 3.8–5.2)
RBC # FLD: 19.9 % — HIGH (ref 10.3–14.5)
SODIUM SERPL-SCNC: 141 MMOL/L — SIGNIFICANT CHANGE UP (ref 135–145)
SURGICAL PATHOLOGY STUDY: SIGNIFICANT CHANGE UP
WBC # BLD: 6.27 K/UL — SIGNIFICANT CHANGE UP (ref 3.8–10.5)
WBC # FLD AUTO: 6.27 K/UL — SIGNIFICANT CHANGE UP (ref 3.8–10.5)

## 2019-11-29 PROCEDURE — 72158 MRI LUMBAR SPINE W/O & W/DYE: CPT | Mod: 26

## 2019-11-29 PROCEDURE — 99255 IP/OBS CONSLTJ NEW/EST HI 80: CPT

## 2019-11-29 PROCEDURE — 72157 MRI CHEST SPINE W/O & W/DYE: CPT | Mod: 26

## 2019-11-29 RX ORDER — IRON SUCROSE 20 MG/ML
200 INJECTION, SOLUTION INTRAVENOUS EVERY 24 HOURS
Refills: 0 | Status: COMPLETED | OUTPATIENT
Start: 2019-11-30 | End: 2019-11-30

## 2019-11-29 RX ORDER — ENOXAPARIN SODIUM 100 MG/ML
40 INJECTION SUBCUTANEOUS DAILY
Refills: 0 | Status: DISCONTINUED | OUTPATIENT
Start: 2019-11-29 | End: 2019-12-01

## 2019-11-29 RX ORDER — PANTOPRAZOLE SODIUM 20 MG/1
40 TABLET, DELAYED RELEASE ORAL
Refills: 0 | Status: DISCONTINUED | OUTPATIENT
Start: 2019-11-29 | End: 2019-12-13

## 2019-11-29 RX ADMIN — Medication 100 MILLIGRAM(S): at 15:04

## 2019-11-29 RX ADMIN — Medication 100 MILLIGRAM(S): at 05:17

## 2019-11-29 RX ADMIN — Medication 150 MILLIGRAM(S): at 21:29

## 2019-11-29 RX ADMIN — OXYCODONE AND ACETAMINOPHEN 2 TABLET(S): 5; 325 TABLET ORAL at 04:15

## 2019-11-29 RX ADMIN — OXYCODONE AND ACETAMINOPHEN 2 TABLET(S): 5; 325 TABLET ORAL at 10:34

## 2019-11-29 RX ADMIN — ENOXAPARIN SODIUM 40 MILLIGRAM(S): 100 INJECTION SUBCUTANEOUS at 15:03

## 2019-11-29 RX ADMIN — Medication 150 MILLIGRAM(S): at 15:03

## 2019-11-29 RX ADMIN — Medication 150 MILLIGRAM(S): at 05:17

## 2019-11-29 RX ADMIN — LIDOCAINE 1 PATCH: 4 CREAM TOPICAL at 12:50

## 2019-11-29 RX ADMIN — Medication 100 MILLIGRAM(S): at 21:29

## 2019-11-29 RX ADMIN — LIDOCAINE 1 PATCH: 4 CREAM TOPICAL at 19:34

## 2019-11-29 RX ADMIN — PANTOPRAZOLE SODIUM 40 MILLIGRAM(S): 20 TABLET, DELAYED RELEASE ORAL at 05:17

## 2019-11-29 RX ADMIN — OXYCODONE AND ACETAMINOPHEN 2 TABLET(S): 5; 325 TABLET ORAL at 16:33

## 2019-11-29 RX ADMIN — Medication 100 MILLIGRAM(S): at 09:23

## 2019-11-29 RX ADMIN — PANTOPRAZOLE SODIUM 40 MILLIGRAM(S): 20 TABLET, DELAYED RELEASE ORAL at 16:36

## 2019-11-29 RX ADMIN — OXYCODONE AND ACETAMINOPHEN 2 TABLET(S): 5; 325 TABLET ORAL at 11:04

## 2019-11-29 RX ADMIN — IRON SUCROSE 110 MILLIGRAM(S): 20 INJECTION, SOLUTION INTRAVENOUS at 21:13

## 2019-11-29 RX ADMIN — SODIUM CHLORIDE 100 MILLILITER(S): 9 INJECTION, SOLUTION INTRAVENOUS at 08:50

## 2019-11-29 RX ADMIN — LIDOCAINE 1 PATCH: 4 CREAM TOPICAL at 01:02

## 2019-11-29 NOTE — DIETITIAN INITIAL EVALUATION ADULT. - NS FNS WEIGHT CHANGE REASON
Pt reports weight was stable between 130 - 140 pounds after gastric bypass in 2006, but states gradually gaining 100 pounds in the last year due to steroids and "not taking care of her diet", from 130 - 140 pounds to 230 - 240 pounds. Weight as per flow sheets (11/25) 229 pounds - will continue to monitor.

## 2019-11-29 NOTE — CONSULT NOTE ADULT - ASSESSMENT
Missael Moy  43F pmhx fibromyalgia, gastric bypass and cervical / lumbar fusion @ [facility] (lumbar 10/2018) admitted for GI Bleed / anemia found to have incidental partially visualized lumbar fluid collection on CT Chest done for cough. At neurologic baseline since surgery (Mild RLE weakness with R foot peripheral neuropathy). Incision CDI, non erythematous, non tender mild fluctuance vs adipose tissue under incision.  - Incidental finding, likely chronic fluid collection  - May get MRI L spine wwo as inpatient or outpatient if new compared to her previous spinal imaging (unavailable for review)  - Patient should followup with her primary surgeon Dr. Fuller ([facility])  - Neurology eval for chronic R foot peripheral neuropathy  - Reconsult as needed

## 2019-11-29 NOTE — CONSULT NOTE ADULT - SUBJECTIVE AND OBJECTIVE BOX
Patient is a 43y old  Female who presents with a chief complaint of bleeding per rectum (29 Nov 2019 13:46)      HPI:  44yo F pmhx of fibromyalgia, anemia, gastric bypass in 2006 at [place], she was admitted to [place] surgery in 2015 with epigastric pain, found to have ulcer at the gastric staple line, managed with PPI, then discharged, presents here with one day of BRBPR. No history of constipation, she had hemorrhoids when she was pregnant but resolved, no surgeries for hemorrhoids, no chronic anal pain. She reports that after the BRBPR yesterday 1x episode, later in day she had a vaginal bleeding (Believes this is period, right time and has been "bleeding heavily with periods for 4 months". Feeling weaker more sob with bleeding. Does not take motrin, does not smoke. (25 Nov 2019 20:35)  Above reviewed:   Pt says she is having night sweats for a few months but lately over last 6 weeks they are more frequent, did not take temp. She had her neck surgery in July 2018 and has been doing well but her lower back was Oct 2018 and even though is better she still has pain going up the incision and noted rt sided radiculopathy. She also developed a cough 4 weeks ago which is not getting better. She took 3 days of azithro and 3 days of amoxicillin, with no relief, her cough worse at night, does feel it dripping in the back, sputum clear. No weight loss. No SOB.       PAST MEDICAL & SURGICAL HISTORY:  Lupus  Anemia  GI bleed  Joint effusion  Herniated disc, cervical: c6-c7  Lumbar disc herniation: L4-L5, BULGING DISC AT L5-S1  Polyarthralgia  Fibromyalgia  Ectopic pregnancy: 2, both ruptured  Ovarian cyst rupture  H/O abdominoplasty  Internal hernia: s/p repair - 2011  Complication following molar or ectopic pregnancy: bilateral salpingectomy  H/O gastric bypass: June 2009      REVIEW OF SYSTEMS    General: Denies any chills. + Fevers     Skin: No rash  	  Ophthalmologic: Denies any discharge, redness.  	  ENT: No nasal congestion or throat pain.     Respiratory and Thorax: per HPI.   	  Cardiovascular: Resolved chest pain, no palpitations.    Gastrointestinal: No nausea, abdominal pain or diarrhea.    Genitourinary: No dysuria, frequency. No flank pain.    Musculoskeletal: No joint swelling or pain.    Neurological: No extremity weakness but Rt LE restricted     Psychiatric: No hallucinations	    Endocrine: No recent weight gain or loss.     Allergic/Immunologic: No hives or rash       Social history:  Lives with family, worked as teacher, no smoking.       FAMILY HISTORY:  Family hx of DM in mother's side, heart problems in father's side.       Allergies  IV Contrast (Hives)  shellfish (Hives; Rash)      Antimicrobials:        Vital Signs Last 24 Hrs  T(C): 36.4 (29 Nov 2019 11:26), Max: 36.9 (29 Nov 2019 03:56)  T(F): 97.6 (29 Nov 2019 11:26), Max: 98.4 (29 Nov 2019 03:56)  HR: 88 (29 Nov 2019 11:26) (77 - 92)  BP: 115/67 (29 Nov 2019 11:26) (96/61 - 135/89)  BP(mean): --  RR: 18 (29 Nov 2019 11:26) (18 - 18)  SpO2: 97% (29 Nov 2019 11:26) (97% - 99%)        PHYSICAL EXAM: Patient in no acute distress.    Constitutional: Comfortable. Awake and alert    Eyes: No discharge or conjunctival injection    ENT: No thrush. No pharyngeal exudate or erythema.    Neck: Supple,     Respiratory: + air entry bilaterally.    Cardiovascular: S1 S2 wnl, No murmurs.    Gastrointestinal: Soft BS(+) no tenderness, non distended.    Genitourinary: No CVA tenderness     Extremities: No edema.    Vascular: peripheral pulses felt    Neurological: No grossly focal deficits.    Skin: No rash     Musculoskeletal: No joint swelling. Well healed surgical scars, no open ulcers to the skin.     Psychiatric: Affect normal.                              9.3    6.27  )-----------( 359      ( 29 Nov 2019 08:37 )             30.7       11-29    141  |  106  |  6<L>  ----------------------------<  93  3.6   |  22  |  0.51    Ca    9.2      29 Nov 2019 06:39      Urinalysis (11.26.19 @ 00:24)    Glucose Qualitative, Urine: Negative    Blood, Urine: Large    pH Urine: 6.5    Color: Yellow    Urine Appearance: Clear    Bilirubin: Small    Ketone - Urine: Large    Specific Gravity: 1.029    Protein, Urine: 30 mg/dL    Urobilinogen: 4 mg/dL    Nitrite: Negative    Leukocyte Esterase Concentration: Moderate    Bacteria: Few    Epithelial Cells: Moderate    Red Blood Cell - Urine: >50 /hpf    White Blood Cell - Urine: 3-5    Hyaline Casts: 3 /LPF      Rapid Respiratory Viral Panel (11.25.19 @ 20:54)    Rapid RVP Result: NotDetec      Radiology: Imaging reviewed personally [ x]  < from: Xray Chest 2 Views PA/Lat (11.25.19 @ 18:43) >  IMPRESSION:    Clear lungs.      < from: CT Chest No Cont (11.28.19 @ 16:27) >  IMPRESSION:     Clear lungs.    3.5 x 2.5 cm incompletely imaged subcutaneous fluid collection with   fistulous tract to the skin surface within the mid lower back at the   level of L1 vertebral body. Recommend correlation with physical exam.

## 2019-11-29 NOTE — DIETITIAN INITIAL EVALUATION ADULT. - DIET TYPE
High fiber Recommend change to regular diet. Will continue to monitor and adjust as needed. Spoke to NP.

## 2019-11-29 NOTE — PROGRESS NOTE ADULT - SUBJECTIVE AND OBJECTIVE BOX
INTERVAL HPI/OVERNIGHT EVENTS: No new concerns.   Vital Signs Last 24 Hrs  T(C): 36.4 (29 Nov 2019 11:26), Max: 36.9 (29 Nov 2019 03:56)  T(F): 97.6 (29 Nov 2019 11:26), Max: 98.4 (29 Nov 2019 03:56)  HR: 88 (29 Nov 2019 11:26) (77 - 92)  BP: 115/67 (29 Nov 2019 11:26) (96/61 - 135/89)  BP(mean): --  RR: 18 (29 Nov 2019 11:26) (18 - 18)  SpO2: 97% (29 Nov 2019 11:26) (97% - 99%)  I&O's Summary    28 Nov 2019 07:01  -  29 Nov 2019 07:00  --------------------------------------------------------  IN: 2260 mL / OUT: 0 mL / NET: 2260 mL    29 Nov 2019 07:01  -  29 Nov 2019 13:13  --------------------------------------------------------  IN: 240 mL / OUT: 0 mL / NET: 240 mL      MEDICATIONS  (STANDING):  benzonatate 100 milliGRAM(s) Oral three times a day  dextrose 5% + sodium chloride 0.9% 1000 milliLiter(s) (100 mL/Hr) IV Continuous <Continuous>  influenza   Vaccine 0.5 milliLiter(s) IntraMuscular once  iron sucrose IVPB 200 milliGRAM(s) IV Intermittent every 24 hours  lidocaine   Patch 1 Patch Transdermal daily  pantoprazole    Tablet 40 milliGRAM(s) Oral two times a day  pregabalin 150 milliGRAM(s) Oral every 8 hours    MEDICATIONS  (PRN):  guaiFENesin   Syrup  (Sugar-Free) 100 milliGRAM(s) Oral every 6 hours PRN Cough  oxycodone    5 mG/acetaminophen 325 mG 2 Tablet(s) Oral every 6 hours PRN Moderate Pain (4 - 6)    LABS:                        9.3    6.27  )-----------( 359      ( 29 Nov 2019 08:37 )             30.7     11-29    141  |  106  |  6<L>  ----------------------------<  93  3.6   |  22  |  0.51    Ca    9.2      29 Nov 2019 06:39          CAPILLARY BLOOD GLUCOSE              REVIEW OF SYSTEMS:  CONSTITUTIONAL: No fever, weight loss, or fatigue  EYES: No eye pain, visual disturbances, or discharge  ENMT:  No difficulty hearing, tinnitus, vertigo; No sinus or throat pain  NECK: No pain or stiffness  BREASTS: No pain, masses, or nipple discharge  RESPIRATORY: No cough, wheezing, chills or hemoptysis; No shortness of breath  CARDIOVASCULAR: No chest pain, palpitations, dizziness, or leg swelling  GASTROINTESTINAL: No abdominal or epigastric pain. No nausea, vomiting, or hematemesis; No diarrhea or constipation. No melena or hematochezia.  GENITOURINARY: No dysuria, frequency, hematuria, or incontinence  NEUROLOGICAL: No headaches, memory loss, loss of strength, numbness, or tremors  SKIN: No itching, burning, rashes, or lesions   LYMPH NODES: No enlarged glands  ENDOCRINE: No heat or cold intolerance; No hair loss  MUSCULOSKELETAL: No joint pain or swelling; No muscle, back, or extremity pain  PSYCHIATRIC: No depression, anxiety, mood swings, or difficulty sleeping  HEME/LYMPH: No easy bruising, or bleeding gums  ALLERY AND IMMUNOLOGIC: No hives or eczema    RADIOLOGY & ADDITIONAL TESTS:    Consultant(s) Notes Reviewed:  [x ] YES  [ ] NO    PHYSICAL EXAM:  GENERAL: NAD, well-groomed, well-developed,not in any distress ,  HEAD:  Atraumatic, Normocephalic  EYES: EOMI, PERRLA, conjunctiva and sclera clear  ENMT: No tonsillar erythema, exudates, or enlargement; Moist mucous membranes, Good dentition, No lesions  NECK: Supple, No JVD, Normal thyroid  NERVOUS SYSTEM:  Alert & Oriented X3, No focal deficit , Diffuse tenderness lower back   CHEST/LUNG: Good air entry bilateral with no  rales, rhonchi, wheezing, or rubs  HEART: Regular rate and rhythm; No murmurs, rubs, or gallops  ABDOMEN: Soft, Nontender, Nondistended; Bowel sounds present  EXTREMITIES:  2+ Peripheral Pulses, No clubbing, cyanosis, or edema  SKIN: No rashes or lesions    Care Discussed with Consultants/Other Providers [ x] YES  [ ] NO

## 2019-11-29 NOTE — CONSULT NOTE ADULT - SUBJECTIVE AND OBJECTIVE BOX
p ([id number])     HPI:  42yo F pmhx of fibromyalgia, anemia, gastric bypass in 2006 at [place], she was admitted to [place] surgery in 2015 with epigastric pain, found to have ulcer at the gastric staple line, managed with PPI, then discharged, presents here with one day of BRBPR. No history of constipation, she had hemorrhoids when she was pregnant but resolved, no surgeries for hemorrhoids, no chronic anal pain. She reports that after the BRBPR yesterday 1x episode, later in day she had a vaginal bleeding (Believes this is period, right time and has been "bleeding heavily with periods for 4 months". Feeling weaker more sob with bleeding. Does not take motrin, does not smoke. (25 Nov 2019 20:35)    Imaging:    Exam:  AOx3, FC, PERRL, EOMI, no facial   RLE 4/5 proximally, 4+5 distally (baseline since previous spine surgery) otherwise 5/5 throughout LE  R circumferential foot numbness (baseline since surgery)    --Anticoagulation:  enoxaparin Injectable 40 milliGRAM(s) SubCutaneous daily    =====================  PAST MEDICAL HISTORY   Lupus  Anemia  GI bleed  Joint effusion  Herniated disc, cervical  Lumbar disc herniation  Polyarthralgia  Fibromyalgia  Ectopic pregnancy  Ovarian cyst rupture    PAST SURGICAL HISTORY   H/O abdominoplasty  Internal hernia  Complication following molar or ectopic pregnancy  H/O gastric bypass    IV Contrast (Hives)  shellfish (Hives; Rash)      MEDICATIONS:  Antibiotics:    Neuro:  oxycodone    5 mG/acetaminophen 325 mG 2 Tablet(s) Oral every 6 hours PRN  pregabalin 150 milliGRAM(s) Oral every 8 hours    Other:  benzonatate 100 milliGRAM(s) Oral three times a day  dextrose 5% + sodium chloride 0.9% 1000 milliLiter(s) IV Continuous <Continuous>  guaiFENesin   Syrup  (Sugar-Free) 100 milliGRAM(s) Oral every 6 hours PRN  influenza   Vaccine 0.5 milliLiter(s) IntraMuscular once  iron sucrose IVPB 200 milliGRAM(s) IV Intermittent every 24 hours  pantoprazole    Tablet 40 milliGRAM(s) Oral two times a day      SOCIAL HISTORY:   Occupation:   Marital Status:     FAMILY HISTORY:  No pertinent family history in first degree relatives      ROS: Negative except per HPI    LABS:                          9.3    6.27  )-----------( 359      ( 29 Nov 2019 08:37 )             30.7     11-29    141  |  106  |  6<L>  ----------------------------<  93  3.6   |  22  |  0.51    Ca    9.2      29 Nov 2019 06:39

## 2019-11-29 NOTE — DIETITIAN INITIAL EVALUATION ADULT. - PHYSICAL APPEARANCE
obese/No visual signs of muscle/fat loss noted/other (specify) Ht: 61 inches Wt: 229 pounds BMI: 43.3 kg/m2 IBW: 105 (+/-10%) 218.1 %IBW  No noted edema as per flow sheets.   Skin: no noted pressure injuries as per documentation.

## 2019-11-29 NOTE — CHART NOTE - NSCHARTNOTEFT_GEN_A_CORE
Upon Nutritional Assessment by the Registered Dietitian your patient was determined to meet criteria / has evidence of the following diagnosis/diagnoses:          [ ]  Mild Protein Calorie Malnutrition        [ ]  Moderate Protein Calorie Malnutrition        [ ] Severe Protein Calorie Malnutrition        [ ] Unspecified Protein Calorie Malnutrition        [ ] Underweight / BMI <19        [x] Morbid Obesity / BMI > 40      Findings as based on:  [x] Comprehensive nutrition assessment   [ ] Nutrition Focused Physical Exam  [x] Other: BMI 43.3       Nutrition Plan/Recommendations:    1. Recommend change to regular diet. Will continue to monitor and adjust as needed  2. Encourage PO intake and obtain food preferences (obtained at this time).   3. Provided thorough education on weight loss nutrition therapy as per pt's request - made aware RD remains available.    RD remains available  Celena Reina MS, RDN CDN #[phone]    PROVIDER Section:     By signing this assessment you are acknowledging and agree with the diagnosis/diagnoses assigned by the Registered Dietitian    Comments:

## 2019-11-29 NOTE — CONSULT NOTE ADULT - ASSESSMENT
2yo F pmhx of fibromyalgia, anemia, gastric bypass in 2006 at [place], she was admitted to [facility] surgery in 2015 with epigastric pain, found to have ulcer at the gastric staple line, managed with PPI, then discharged, presents here with one day of BRBPR.     Pt with intermittent low grade temp, Abnormal CT, concern for post op spinal/ soft tissue infection.       Plan:   Given pt hemodynamically stable, hold off on initiating abx at this time for optimal microbiologic diagnosis   Reviewed CT with radiology, there is fluid collection, not completely imaged, recommend MRI with contrast.   MRI thoracic, LS spine,   ESR/CRP ordered  Blood cx ordered.   Will need aspiration of the fluid for Bacterial, AFB, Fungal cx after MRI.   Discussed with Neuroradiology planned for aspiration on Monday, hold lovenox the night before.   s/p Neurosurgery eval

## 2019-11-29 NOTE — CHART NOTE - NSCHARTNOTEFT_GEN_A_CORE
General surgery, Dr. Ellis, consulted regarding lumbar collection seen on CT scan 11/29 "3.5 x 2.5 cm incompletely imaged subcutaneous fluid collection with fistulous tract to the skin surface within the mid lower back at the level of L1 vertebral body". Of note, [facility] was consulted on this patient previously for BRBPR x 1 day, now resolved. hx of gastric bypass c/b marginal ulcer at gastric staple line treated conservatively. Escope/cscope 11/27 sig for internal hemorrhoids non-bleeding, likely source of BRBPR.    Per chart review, NSGY also consulted, recommended outpatient f/u with patient's neurosurgeon and possible MRI. IR Dr. Reid consulted, plan for drain of fluid 12/2.    Discussed with DALJIT Miller, general surgery consult no longer needed at this time. NP Angela said that she would discuss with Dr. Bustos and re-consult general surgery prn. Patient was not seen and examined.    [facility]  [id number]

## 2019-11-29 NOTE — PHARMACOTHERAPY INTERVENTION NOTE - COMMENTS
Pantoprazole 40mg IV Q12H was switched to 40mg DR tab by mouth Q12H per IV/PO conversion policy.     Doron Todd, PharmD  [phone]

## 2019-11-29 NOTE — CHART NOTE - NSCHARTNOTEFT_GEN_A_CORE
Path resulted from recent EGD/colonoscopy. Discussed results with patient. HP negative, but findings c/w chronic gastritis (and patient receiving PPI therapy for treatment). Colonoscopy results notable for non-specific chronic colitis at [facility]. Etiology unclear. IBD on differential, but patient without chronic symptoms. Limited NSAID use. At this time, would defer empiric therapy given overall limited GI symptoms. Patient instructed to follow up at next available GI appointment in 2-3 months to reassess symptoms.   Further management per primary team.       Final Diagnosis  1. Gastric, biopsy:  - Gastric antrum and oxyntic mucosa with chronic gastritis.  - Negative for Helicobacter pylori (special stain).  - Negative for intestinal metaplasia.    2. Ileocecal valve, biopsy:  - Focal active chronic colitis with rare neutrophil and mild  crypt distortion.  - Negative for dysplasia and granulomata.    Verified by: Wojciech Mcneil M.D.  (Electronic Signature)  Reported on: 11/29/19 09:44 EST, [address] [place] [address]  Phone: [phone]   Fax: [phone]  _________________________________________________________________    Clinical History  pre dx: history ulcer, rectal bleed, anemia  post dx: hemorrhoids    Specimen(s) Submitted  1     Gastric biopsy  2     Ileocecal valve biopsy    Gross Description  1.   The specimen is received in formalin and the specimen  container is labeled:   Gastric biopsy.  It consists of three tan  soft fragments of tissue ranging from 0.2 cm to 0.5 cm in  greatest dimension.  Special stains are requested.  Entirely  submitted.  One cassette.    2.   The specimen is received in formalin and the specimen  container is labeled: Ileocecal valve biopsy.  It consists of two  tan soft fragments of tissue, each measuring up to 0.3 cm in  greatest dimension.  Entirely submitted.  One cassette.    In addition to other data that may appear on the specimen  containers, all labelshave been inspected to confirm the  presence of the patient's name and date of birth.  Bobbi Neeraj 11/27/2019 18:15

## 2019-11-29 NOTE — DIETITIAN INITIAL EVALUATION ADULT. - OTHER INFO
Pt reports in general good appetite and PO intake at home, states decreased x 2 days PTA due to not feeling well. Reports allergy to shellfish, states causes hives; denies having any other food allergies or intolerances. Pt reports not following any type of diet or restriction at home. Reports taking Multivitamin PTA and a protein shake "occasionally".     Pt reports now good appetite and PO intake due to feeling better. Denies difficulty chewing/swallowing. Denies nausea, but reports vomiting this am (11/29) due to "coughing hard". Denies diarrhea or constipation, reports last BM yesterday (11/28). Pt requests change to regular diet - states knows what to order that is high in fiber, spoke to NP.     Provided thorough education on weight loss nutrition therapy as per pt's request. Described the benefits of gradual/healthy weight loss in overall health, encouraged pt to consume balanced meals, discussed groups of foods and portion sizes, recommended to avoid concentrated sweets and sweetened beverages such as soda or juice and encouraged water throughout the day; discussed how to adjust diet recall to recommendations. Pt amenable for education - made aware RD remains available.

## 2019-11-29 NOTE — DIETITIAN INITIAL EVALUATION ADULT. - ENERGY NEEDS
Pertinent information as per chart: Pt 42 y/o F with PMH: fibromyalgia, anemia, gastric bypass in 2006, ulcer at the gastric staple line - managed with PPI, admitted with one day of BRBPR, anemia, S/P EGD and Colonoscopy, found with gastritis S/P biopsy, pending results.

## 2019-11-29 NOTE — DIETITIAN INITIAL EVALUATION ADULT. - ADD RECOMMEND
1. Will continue to monitor PO intake, weight, labs, skin, GI status, diet. 2. Encourage PO intake and obtain food preferences (obtained at this time). 3. Provided thorough education on weight loss nutrition therapy as per pt's request - made aware RD remains available. 4. BMI > 40 notification placed in chart - spoke to NP.

## 2019-11-29 NOTE — PROGRESS NOTE ADULT - ASSESSMENT
44yo F pmhx of fibromyalgia, anemia, gastric bypass in 2006 at [place], she was admitted to [facility] in 2015 with epigastric pain, found to have ulcer at the gastric staple line, managed with PPI, then discharged, presents here with one day of BRBPR. No history of constipation, she had hemorrhoids when she was pregnant but resolved, no surgeries for hemorrhoids, no chronic anal pain. She reports that after the BRBPR yesterday 1x episode, later in day she had a vaginal bleeding (Believes this is period, right time and has been "bleeding heavily with periods for 4 months". Feeling weaker more sob with bleeding. Does not take motrin, does not smoke.      Problem/Plan - 1:  ·  Problem: GIB (gastrointestinal bleeding).  Plan: Hemodynamically stable. GI and Colorectal help appreciated. S/P EGD and Colonoscopy.      Problem/Plan - 2:  ·  Problem: Anemia due to blood loss.  Plan: Transfusing to keep Hgb>9 G . IV iron and checking AM labs.      Problem/Plan - 3:  ·  Problem:  Fever and chills with persistent cough .  Plan: RVP ,Chest Xray noted  and cultures noted.  No Leucocytosis . CT chest noted.     < from: CT Chest No Cont (11.28.19 @ 16:27) >  IMPRESSION:     Clear lungs.    3.5 x 2.5 cm incompletely imaged subcutaneous fluid collection with   fistulous tract to the skin surface within the mid lower back at the   level of L1 vertebral body. Recommend correlation with physical exam.    These findings were discussed [place] by Dr. Boykin on 11/29/19 10:22   AM.    < end of copied text >  ID abd Surgery consulted.      Problem/Plan - 4:  ·  Problem: Back pain with radiculopathy.  Plan: H/O Surgeries and will f/u with her spinal surgeon.      Problem/Plan - 5:  ·  Problem: Fibromyalgia.  Plan: Home meds.      Problem/Plan - 6:  Problem: Obesity. Plan: H/O Gastric Bypass and Bariatric surgery follow up noted.

## 2019-11-30 LAB
HCT VFR BLD CALC: 31.3 % — LOW (ref 34.5–45)
HGB BLD-MCNC: 9.4 G/DL — LOW (ref 11.5–15.5)
MCHC RBC-ENTMCNC: 25.9 PG — LOW (ref 27–34)
MCHC RBC-ENTMCNC: 30 GM/DL — LOW (ref 32–36)
MCV RBC AUTO: 86.2 FL — SIGNIFICANT CHANGE UP (ref 80–100)
NRBC # BLD: 0 /100 WBCS — SIGNIFICANT CHANGE UP (ref 0–0)
PLATELET # BLD AUTO: 222 K/UL — SIGNIFICANT CHANGE UP (ref 150–400)
RBC # BLD: 3.63 M/UL — LOW (ref 3.8–5.2)
RBC # FLD: 20.8 % — HIGH (ref 10.3–14.5)
WBC # BLD: 4.57 K/UL — SIGNIFICANT CHANGE UP (ref 3.8–10.5)
WBC # FLD AUTO: 4.57 K/UL — SIGNIFICANT CHANGE UP (ref 3.8–10.5)

## 2019-11-30 PROCEDURE — 99233 SBSQ HOSP IP/OBS HIGH 50: CPT

## 2019-11-30 RX ADMIN — Medication 100 MILLIGRAM(S): at 13:29

## 2019-11-30 RX ADMIN — SODIUM CHLORIDE 100 MILLILITER(S): 9 INJECTION, SOLUTION INTRAVENOUS at 07:20

## 2019-11-30 RX ADMIN — OXYCODONE AND ACETAMINOPHEN 2 TABLET(S): 5; 325 TABLET ORAL at 14:30

## 2019-11-30 RX ADMIN — Medication 150 MILLIGRAM(S): at 13:53

## 2019-11-30 RX ADMIN — OXYCODONE AND ACETAMINOPHEN 2 TABLET(S): 5; 325 TABLET ORAL at 00:31

## 2019-11-30 RX ADMIN — PANTOPRAZOLE SODIUM 40 MILLIGRAM(S): 20 TABLET, DELAYED RELEASE ORAL at 18:58

## 2019-11-30 RX ADMIN — LIDOCAINE 1 PATCH: 4 CREAM TOPICAL at 00:50

## 2019-11-30 RX ADMIN — Medication 100 MILLIGRAM(S): at 06:20

## 2019-11-30 RX ADMIN — Medication 100 MILLIGRAM(S): at 13:52

## 2019-11-30 RX ADMIN — Medication 150 MILLIGRAM(S): at 21:00

## 2019-11-30 RX ADMIN — Medication 150 MILLIGRAM(S): at 06:18

## 2019-11-30 RX ADMIN — Medication 100 MILLIGRAM(S): at 21:00

## 2019-11-30 RX ADMIN — OXYCODONE AND ACETAMINOPHEN 2 TABLET(S): 5; 325 TABLET ORAL at 13:54

## 2019-11-30 RX ADMIN — ENOXAPARIN SODIUM 40 MILLIGRAM(S): 100 INJECTION SUBCUTANEOUS at 13:28

## 2019-11-30 RX ADMIN — PANTOPRAZOLE SODIUM 40 MILLIGRAM(S): 20 TABLET, DELAYED RELEASE ORAL at 06:24

## 2019-11-30 RX ADMIN — OXYCODONE AND ACETAMINOPHEN 2 TABLET(S): 5; 325 TABLET ORAL at 21:30

## 2019-11-30 RX ADMIN — OXYCODONE AND ACETAMINOPHEN 2 TABLET(S): 5; 325 TABLET ORAL at 21:00

## 2019-11-30 RX ADMIN — LIDOCAINE 1 PATCH: 4 CREAM TOPICAL at 21:05

## 2019-11-30 RX ADMIN — LIDOCAINE 1 PATCH: 4 CREAM TOPICAL at 13:27

## 2019-11-30 RX ADMIN — OXYCODONE AND ACETAMINOPHEN 2 TABLET(S): 5; 325 TABLET ORAL at 00:01

## 2019-11-30 RX ADMIN — IRON SUCROSE 110 MILLIGRAM(S): 20 INJECTION, SOLUTION INTRAVENOUS at 21:00

## 2019-11-30 RX ADMIN — OXYCODONE AND ACETAMINOPHEN 2 TABLET(S): 5; 325 TABLET ORAL at 06:21

## 2019-11-30 RX ADMIN — Medication 100 MILLIGRAM(S): at 01:01

## 2019-11-30 NOTE — PROGRESS NOTE ADULT - ASSESSMENT
44yo F pmhx of fibromyalgia, anemia, gastric bypass in 2006 at [place], she was admitted to [facility] in 2015 with epigastric pain, found to have ulcer at the gastric staple line, managed with PPI, then discharged, presents here with one day of BRBPR. No history of constipation, she had hemorrhoids when she was pregnant but resolved, no surgeries for hemorrhoids, no chronic anal pain. She reports that after the BRBPR yesterday 1x episode, later in day she had a vaginal bleeding (Believes this is period, right time and has been "bleeding heavily with periods for 4 months". Feeling weaker more sob with bleeding. Does not take motrin, does not smoke.      Problem/Plan - 1:  ·  Problem: GIB (gastrointestinal bleeding).  Plan: Hemodynamically stable. GI and Colorectal help appreciated. S/P EGD and Colonoscopy.      Problem/Plan - 2:  ·  Problem: Anemia due to blood loss.  Plan: Transfusing to keep Hgb>9 G . IV iron and checking AM labs.      Problem/Plan - 3:  ·  Problem:  Fever and chills with persistent cough .  Plan: RVP ,Chest Xray noted  and cultures noted.  No Leucocytosis . CT chest noted.     < from: CT Chest No Cont (11.28.19 @ 16:27) >  IMPRESSION:     Clear lungs.    3.5 x 2.5 cm incompletely imaged subcutaneous fluid collection with   fistulous tract to the skin surface within the mid lower back at the   level of L1 vertebral body. Recommend correlation with physical exam.    These findings were discussed NP Xavier by Dr. Boykin on 11/29/19 10:22   AM.    < end of copied text >  ID abd Surgery consulted.      Problem/Plan - 4:  ·  Problem: Back pain with radiculopathy with Fluid Collection .  Plan: H/O Surgeries and will f/u with her spinal surgeon.   Nsx consult noted. Awaiting MRI and Aspiration by IR on Monday .      Problem/Plan - 5:  ·  Problem: Fibromyalgia.  Plan: Home meds.      Problem/Plan - 6:  Problem: Obesity. Plan: H/O Gastric Bypass and Bariatric surgery follow up noted.

## 2019-11-30 NOTE — PROGRESS NOTE ADULT - SUBJECTIVE AND OBJECTIVE BOX
Follow Up:  back fluid collection    Interval History: pt stable and afebrile pending MRI results     ROS:      All other systems negative    Constitutional: no fever, no chills  Head: no trauma  Eyes: no vision changes, no eye pain  ENT:  no sore throat, no rhinorrhea  Cardiovascular:  no chest pain, no palpitation  Respiratory:  no SOB, no cough  GI:  no abd pain, some nausea  no vomiting, + diarrhea but no blood  urinary: no dysuria, no hematuria, no flank pain  musculoskeletal:  +back pain  skin:  no rash  neurology:  no headache, no seizure, no change in mental status  psych: no anxiety, no depression         Allergies  IV Contrast (Hives)  shellfish (Hives; Rash)        ANTIMICROBIALS:      OTHER MEDS:  benzonatate 100 milliGRAM(s) Oral three times a day  dextrose 5% + sodium chloride 0.9% 1000 milliLiter(s) IV Continuous <Continuous>  enoxaparin Injectable 40 milliGRAM(s) SubCutaneous daily  guaiFENesin   Syrup  (Sugar-Free) 100 milliGRAM(s) Oral every 6 hours PRN  influenza   Vaccine 0.5 milliLiter(s) IntraMuscular once  iron sucrose IVPB 200 milliGRAM(s) IV Intermittent every 24 hours  lidocaine   Patch 1 Patch Transdermal daily  oxycodone    5 mG/acetaminophen 325 mG 2 Tablet(s) Oral every 6 hours PRN  pantoprazole    Tablet 40 milliGRAM(s) Oral two times a day  pregabalin 150 milliGRAM(s) Oral every 8 hours      Vital Signs Last 24 Hrs  T(C): 36.7 (30 Nov 2019 05:03), Max: 36.8 (29 Nov 2019 21:47)  T(F): 98.1 (30 Nov 2019 05:03), Max: 98.3 (29 Nov 2019 21:47)  HR: 68 (30 Nov 2019 05:03) (68 - 81)  BP: 123/84 (30 Nov 2019 05:03) (116/72 - 123/84)  BP(mean): --  RR: 18 (30 Nov 2019 05:03) (18 - 18)  SpO2: 98% (30 Nov 2019 05:03) (96% - 98%)    Physical Exam:  General:    NAD,  non toxic  Head: atraumatic, normocephalic  Eye: normal sclera and conjunctiva  ENT:    no oropharyngeal lesions,   no LAD,   neck supple  Cardio:     regular S1, S2,  no murmur  Respiratory:    clear b/l,    no wheezing  abd:     soft,   BS +,   no tenderness,    no organomegaly  :   no CVAT,  no suprapubic tenderness,   no  blackman  Musculoskeletal:   tenderness on the lower back   vascular: no phlebitis, normal pulses  Skin:    no rash  Neurologic:     no focal deficit  psych: normal affect                          9.4    4.57  )-----------( 222      ( 30 Nov 2019 11:27 )             31.3       11-29    141  |  106  |  6<L>  ----------------------------<  93  3.6   |  22  |  0.51    Ca    9.2      29 Nov 2019 06:39            MICROBIOLOGY:  v      Rapid RVP Result: NotDetec (11-25 @ 20:54)        RADIOLOGY:  Images below reviewed personally  < from: CT Chest No Cont (11.28.19 @ 16:27) >  IMPRESSION:     Clear lungs.    3.5 x 2.5 cm incompletely imaged subcutaneous fluid collection with   fistulous tract to the skin surface within the mid lower back at the   level of L1 vertebral body. Recommend correlation with physical exam. Follow Up:  back fluid collection    Interval History: pt stable and afebrile pending MRI results     ROS:      All other systems negative    Constitutional: no fever, no chills  Head: no trauma  Eyes: no vision changes, no eye pain  ENT:  no sore throat, no rhinorrhea  Cardiovascular:  no chest pain, no palpitation  Respiratory:  no SOB, persistent  cough  GI:  no abd pain, some nausea  no vomiting, + diarrhea but no blood  urinary: no dysuria, no hematuria, no flank pain  musculoskeletal:  +back pain  skin:  no rash  neurology:  no headache, no seizure, no change in mental status  psych: no anxiety, no depression         Allergies  IV Contrast (Hives)  shellfish (Hives; Rash)        ANTIMICROBIALS:      OTHER MEDS:  benzonatate 100 milliGRAM(s) Oral three times a day  dextrose 5% + sodium chloride 0.9% 1000 milliLiter(s) IV Continuous <Continuous>  enoxaparin Injectable 40 milliGRAM(s) SubCutaneous daily  guaiFENesin   Syrup  (Sugar-Free) 100 milliGRAM(s) Oral every 6 hours PRN  influenza   Vaccine 0.5 milliLiter(s) IntraMuscular once  iron sucrose IVPB 200 milliGRAM(s) IV Intermittent every 24 hours  lidocaine   Patch 1 Patch Transdermal daily  oxycodone    5 mG/acetaminophen 325 mG 2 Tablet(s) Oral every 6 hours PRN  pantoprazole    Tablet 40 milliGRAM(s) Oral two times a day  pregabalin 150 milliGRAM(s) Oral every 8 hours      Vital Signs Last 24 Hrs  T(C): 36.7 (30 Nov 2019 05:03), Max: 36.8 (29 Nov 2019 21:47)  T(F): 98.1 (30 Nov 2019 05:03), Max: 98.3 (29 Nov 2019 21:47)  HR: 68 (30 Nov 2019 05:03) (68 - 81)  BP: 123/84 (30 Nov 2019 05:03) (116/72 - 123/84)  BP(mean): --  RR: 18 (30 Nov 2019 05:03) (18 - 18)  SpO2: 98% (30 Nov 2019 05:03) (96% - 98%)    Physical Exam:  General:    NAD,  non toxic  Head: atraumatic, normocephalic  Eye: normal sclera and conjunctiva  ENT:    no oropharyngeal lesions,   no LAD,   neck supple  Cardio:     regular S1, S2,  no murmur  Respiratory:    clear b/l,    no wheezing  abd:     soft,   BS +,   no tenderness,    no organomegaly  :   no CVAT,  no suprapubic tenderness,   no  blackman  Musculoskeletal:   tenderness on the lower back   vascular: no phlebitis, normal pulses  Skin:    no rash  Neurologic:     no focal deficit  psych: normal affect                          9.4    4.57  )-----------( 222      ( 30 Nov 2019 11:27 )             31.3       11-29    141  |  106  |  6<L>  ----------------------------<  93  3.6   |  22  |  0.51    Ca    9.2      29 Nov 2019 06:39            MICROBIOLOGY:  v      Rapid RVP Result: NotDetec (11-25 @ 20:54)        RADIOLOGY:  Images below reviewed personally  < from: CT Chest No Cont (11.28.19 @ 16:27) >  IMPRESSION:     Clear lungs.    3.5 x 2.5 cm incompletely imaged subcutaneous fluid collection with   fistulous tract to the skin surface within the mid lower back at the   level of L1 vertebral body. Recommend correlation with physical exam.

## 2019-11-30 NOTE — PROGRESS NOTE ADULT - ASSESSMENT
43 f with fibromyalgia, anemia, gastric bypass in 2006 at [place], p/w  BRBPR.    intermittent low grade temp, Abnormal chest CT with 3.5 cm subcut fluid collection with fistulous  tract to the skin in mid lower back, concern for post op spinal/ soft tissue infection.   as pt  pt hemodynamically stable with no fever or WBC now, no abx started for optimal microbiologic diagnosis   MRI done pending resultus  f/u the ESR, CRP  f/u the MRI  Will need aspiration of the fluid for Bacterial, AFB, Fungal cx after MRI.   planned for aspiration on Monday, hold lovenox the night before. 43 f with fibromyalgia, anemia, gastric bypass in 2006 at [place], p/w  BRBPR.   intermittent low grade temp, Abnormal chest CT with 3.5 cm subcut fluid collection with fistulous  tract to the skin in mid lower back, concern for post op spinal/ soft tissue infection.   as pt  pt hemodynamically stable with no fever or WBC now, no abx started for optimal microbiologic diagnosis   MRI done pending resultus  f/u the ESR, CRP  f/u the MRI  Will need aspiration of the fluid   please send cx for Bacterial, AFB, Fungal cx

## 2019-11-30 NOTE — PROGRESS NOTE ADULT - SUBJECTIVE AND OBJECTIVE BOX
INTERVAL HPI/OVERNIGHT EVENTS: I feel fine.   Vital Signs Last 24 Hrs  T(C): 36.7 (30 Nov 2019 05:03), Max: 36.8 (29 Nov 2019 21:47)  T(F): 98.1 (30 Nov 2019 05:03), Max: 98.3 (29 Nov 2019 21:47)  HR: 68 (30 Nov 2019 05:03) (68 - 81)  BP: 123/84 (30 Nov 2019 05:03) (116/72 - 123/84)  BP(mean): --  RR: 18 (30 Nov 2019 05:03) (18 - 18)  SpO2: 98% (30 Nov 2019 05:03) (96% - 98%)  I&O's Summary    29 Nov 2019 07:01  -  30 Nov 2019 07:00  --------------------------------------------------------  IN: 2020 mL / OUT: 0 mL / NET: 2020 mL      MEDICATIONS  (STANDING):  benzonatate 100 milliGRAM(s) Oral three times a day  dextrose 5% + sodium chloride 0.9% 1000 milliLiter(s) (100 mL/Hr) IV Continuous <Continuous>  enoxaparin Injectable 40 milliGRAM(s) SubCutaneous daily  influenza   Vaccine 0.5 milliLiter(s) IntraMuscular once  iron sucrose IVPB 200 milliGRAM(s) IV Intermittent every 24 hours  lidocaine   Patch 1 Patch Transdermal daily  pantoprazole    Tablet 40 milliGRAM(s) Oral two times a day  pregabalin 150 milliGRAM(s) Oral every 8 hours    MEDICATIONS  (PRN):  guaiFENesin   Syrup  (Sugar-Free) 100 milliGRAM(s) Oral every 6 hours PRN Cough  oxycodone    5 mG/acetaminophen 325 mG 2 Tablet(s) Oral every 6 hours PRN Moderate Pain (4 - 6)    LABS:                        9.4    4.57  )-----------( 222      ( 30 Nov 2019 11:27 )             31.3     11-29    141  |  106  |  6<L>  ----------------------------<  93  3.6   |  22  |  0.51    Ca    9.2      29 Nov 2019 06:39          CAPILLARY BLOOD GLUCOSE              REVIEW OF SYSTEMS:  CONSTITUTIONAL: No fever, weight loss, or fatigue  EYES: No eye pain, visual disturbances, or discharge  ENMT:  No difficulty hearing, tinnitus, vertigo; No sinus or throat pain  NECK: No pain or stiffness  RESPIRATORY: No cough, wheezing, chills or hemoptysis; No shortness of breath  CARDIOVASCULAR: No chest pain, palpitations, dizziness, or leg swelling  GASTROINTESTINAL: No abdominal or epigastric pain. No nausea, vomiting, or hematemesis; No diarrhea or constipation. No melena or hematochezia.  GENITOURINARY: No dysuria, frequency, hematuria, or incontinence  NEUROLOGICAL: No headaches, memory loss, loss of strength, numbness, or tremors    Consultant(s) Notes Reviewed:  [x ] YES  [ ] NO    PHYSICAL EXAM:  GENERAL: NAD, well-groomed, well-developed ,not in any distress ,  HEAD:  Atraumatic, Normocephalic  EYES: EOMI, PERRLA, conjunctiva and sclera clear  ENMT: No tonsillar erythema, exudates, or enlargement; Moist mucous membranes, Good dentition, No lesions  NECK: Supple, No JVD, Normal thyroid  NERVOUS SYSTEM:  Alert & Oriented X3, No focal deficit   CHEST/LUNG: Good air entry bilateral with no  rales, rhonchi, wheezing, or rubs  HEART: Regular rate and rhythm; No murmurs, rubs, or gallops  ABDOMEN: Soft, Nontender, Nondistended; Bowel sounds present  EXTREMITIES:  2+ Peripheral Pulses, No clubbing, cyanosis, or edema    Care Discussed with Consultants/Other Providers [ x] YES  [ ] NO

## 2019-12-01 LAB
CRP SERPL-MCNC: 0.44 MG/DL — HIGH (ref 0–0.4)
ERYTHROCYTE [SEDIMENTATION RATE] IN BLOOD: 26 MM/HR — HIGH (ref 0–15)
HCT VFR BLD CALC: 29.8 % — LOW (ref 34.5–45)
HGB BLD-MCNC: 9.4 G/DL — LOW (ref 11.5–15.5)
INR BLD: 1.01 RATIO — SIGNIFICANT CHANGE UP (ref 0.88–1.16)
MCHC RBC-ENTMCNC: 26.6 PG — LOW (ref 27–34)
MCHC RBC-ENTMCNC: 31.5 GM/DL — LOW (ref 32–36)
MCV RBC AUTO: 84.4 FL — SIGNIFICANT CHANGE UP (ref 80–100)
NRBC # BLD: 0 /100 WBCS — SIGNIFICANT CHANGE UP (ref 0–0)
PLATELET # BLD AUTO: 329 K/UL — SIGNIFICANT CHANGE UP (ref 150–400)
PROTHROM AB SERPL-ACNC: 11.6 SEC — SIGNIFICANT CHANGE UP (ref 10–12.9)
RBC # BLD: 3.53 M/UL — LOW (ref 3.8–5.2)
RBC # FLD: 21 % — HIGH (ref 10.3–14.5)
WBC # BLD: 4.9 K/UL — SIGNIFICANT CHANGE UP (ref 3.8–10.5)
WBC # FLD AUTO: 4.9 K/UL — SIGNIFICANT CHANGE UP (ref 3.8–10.5)

## 2019-12-01 PROCEDURE — 99233 SBSQ HOSP IP/OBS HIGH 50: CPT

## 2019-12-01 RX ADMIN — Medication 100 MILLIGRAM(S): at 13:14

## 2019-12-01 RX ADMIN — Medication 100 MILLIGRAM(S): at 21:21

## 2019-12-01 RX ADMIN — Medication 150 MILLIGRAM(S): at 21:21

## 2019-12-01 RX ADMIN — LIDOCAINE 1 PATCH: 4 CREAM TOPICAL at 19:00

## 2019-12-01 RX ADMIN — OXYCODONE AND ACETAMINOPHEN 2 TABLET(S): 5; 325 TABLET ORAL at 09:10

## 2019-12-01 RX ADMIN — Medication 100 MILLIGRAM(S): at 11:02

## 2019-12-01 RX ADMIN — LIDOCAINE 1 PATCH: 4 CREAM TOPICAL at 02:10

## 2019-12-01 RX ADMIN — Medication 150 MILLIGRAM(S): at 13:14

## 2019-12-01 RX ADMIN — OXYCODONE AND ACETAMINOPHEN 2 TABLET(S): 5; 325 TABLET ORAL at 09:35

## 2019-12-01 RX ADMIN — OXYCODONE AND ACETAMINOPHEN 2 TABLET(S): 5; 325 TABLET ORAL at 03:36

## 2019-12-01 RX ADMIN — PANTOPRAZOLE SODIUM 40 MILLIGRAM(S): 20 TABLET, DELAYED RELEASE ORAL at 06:23

## 2019-12-01 RX ADMIN — LIDOCAINE 1 PATCH: 4 CREAM TOPICAL at 23:43

## 2019-12-01 RX ADMIN — PANTOPRAZOLE SODIUM 40 MILLIGRAM(S): 20 TABLET, DELAYED RELEASE ORAL at 16:55

## 2019-12-01 RX ADMIN — Medication 150 MILLIGRAM(S): at 06:23

## 2019-12-01 RX ADMIN — Medication 100 MILLIGRAM(S): at 06:23

## 2019-12-01 RX ADMIN — SODIUM CHLORIDE 100 MILLILITER(S): 9 INJECTION, SOLUTION INTRAVENOUS at 09:26

## 2019-12-01 RX ADMIN — OXYCODONE AND ACETAMINOPHEN 2 TABLET(S): 5; 325 TABLET ORAL at 03:06

## 2019-12-01 RX ADMIN — Medication 100 MILLIGRAM(S): at 03:07

## 2019-12-01 RX ADMIN — OXYCODONE AND ACETAMINOPHEN 2 TABLET(S): 5; 325 TABLET ORAL at 17:40

## 2019-12-01 RX ADMIN — LIDOCAINE 1 PATCH: 4 CREAM TOPICAL at 11:02

## 2019-12-01 NOTE — PROGRESS NOTE ADULT - ASSESSMENT
44yo F pmhx of fibromyalgia, anemia, gastric bypass in 2006 at [place], she was admitted to [facility] in 2015 with epigastric pain, found to have ulcer at the gastric staple line, managed with PPI, then discharged, presents here with one day of BRBPR. No history of constipation, she had hemorrhoids when she was pregnant but resolved, no surgeries for hemorrhoids, no chronic anal pain. She reports that after the BRBPR yesterday 1x episode, later in day she had a vaginal bleeding (Believes this is period, right time and has been "bleeding heavily with periods for 4 months". Feeling weaker more sob with bleeding. Does not take motrin, does not smoke.      Problem/Plan - 1:  ·  Problem: GIB (gastrointestinal bleeding).  Plan: Hemodynamically stable. GI and Colorectal help appreciated. S/P EGD and Colonoscopy.      Problem/Plan - 2:  ·  Problem: Anemia due to acute blood loss.  Plan: Transfusing to keep Hgb>9 G . IV iron and cbc stable.      Problem/Plan - 3:  ·  Problem:  Fever and chills with persistent cough .  Plan: RVP ,Chest Xray noted  and cultures noted.  No Leucocytosis . CT chest noted.     < from: CT Chest No Cont (11.28.19 @ 16:27) >  IMPRESSION:     Clear lungs.    3.5 x 2.5 cm incompletely imaged subcutaneous fluid collection with   fistulous tract to the skin surface within the mid lower back at the   level of L1 vertebral body. Recommend correlation with physical exam.    These findings were discussed NP Xavier by Dr. Boykin on 11/29/19 10:22   AM.    < end of copied text >  ID abd Surgery consulted.      Problem/Plan - 4:  ·  Problem: Back pain with radiculopathy with Fluid Collection .  Plan: H/O Surgeries and will f/u with her spinal surgeon.   Nsx consult noted. MRI < from: MR Lumbar Spine w/wo IV Cont (11.29.19 @ 23:49) >  Impression: There is a large fluid collection with the measurements given   above in the subcutaneous fat. The patient has undergone a laminectomy   between the prior CT of 8/28/2018 and the current evaluation at the L5-S1   level. That operation and associated imaging was not done in this   hospital. The collection described does not communicate or extend into   the paraspinal musculature or into the region of the spinal canal. It   does enhance around its rim. The fat on the T1-weighted sequences around   the region appears pristine. This collection may likely be a benign fluid   collection however, possibility of an infected collection is not entirely   excluded. Clinical correlation is necessary.    < end of copied text >  and Aspiration by IR on Monday .      Problem/Plan - 5:  ·  Problem: Fibromyalgia.  Plan: Home meds.      Problem/Plan - 6:  Problem: Obesity. Plan: H/O Gastric Bypass and Bariatric surgery follow up noted.

## 2019-12-01 NOTE — PROGRESS NOTE ADULT - ASSESSMENT
43 f with fibromyalgia, anemia, gastric bypass in 2006 at [place], p/w  BRBPR and persistent cough for 4 weeks   intermittent low grade temp, chest CT negative but with 3.5 cm subcut fluid collection with fistulous  tract to the skin in mid lower back, concern for post op spinal/ soft tissue infection.   as pt hemodynamically stable with no fever or WBC now, no abx started for optimal microbiologic diagnosis   MRI showed large fluid collection in the subcutaneous fat, does not communicate or extend into the paraspinal musculature or into the region of the spinal canal. It does enhance around its rim. The fat on the T1-weighted sequences around the region appears pristine. This collection may likely be a benign fluid collection however, possibility of an infected collection is not entirely excluded.    ESR: 26, CRP: 0.44    rim enhancing large low back fluid collection not communicating or extending to paraspinal musculature  * plan for aspiration of the fluid, will need Bacterial, AFB, Fungal cx    persistent cough for 4 weeks  chest CT negative, ?hyperreactive airway  * pulm eval outpatient

## 2019-12-01 NOTE — PROGRESS NOTE ADULT - SUBJECTIVE AND OBJECTIVE BOX
Follow Up:  back fluid collection    Interval History: pt stable and afebrile pending MRI results     ROS:      All other systems negative    Constitutional: no fever, no chills  Head: no trauma  Eyes: no vision changes, no eye pain  ENT:  no sore throat, no rhinorrhea  Cardiovascular:  no chest pain, no palpitation  Respiratory:  no SOB, persistent cough for 4 weeks  GI:  no abd pain, some nausea  no vomiting, + diarrhea but no blood  urinary: no dysuria, no hematuria, no flank pain  musculoskeletal:  +back pain  skin:  no rash  neurology:  no headache, no seizure, no change in mental status  psych: no anxiety, no depression         Allergies  IV Contrast (Hives)  shellfish (Hives; Rash)        ANTIMICROBIALS:      OTHER MEDS:  benzonatate 100 milliGRAM(s) Oral three times a day  dextrose 5% + sodium chloride 0.9% 1000 milliLiter(s) IV Continuous <Continuous>  guaiFENesin   Syrup  (Sugar-Free) 100 milliGRAM(s) Oral every 6 hours PRN  influenza   Vaccine 0.5 milliLiter(s) IntraMuscular once  lidocaine   Patch 1 Patch Transdermal daily  oxycodone    5 mG/acetaminophen 325 mG 2 Tablet(s) Oral every 6 hours PRN  pantoprazole    Tablet 40 milliGRAM(s) Oral two times a day  pregabalin 150 milliGRAM(s) Oral every 8 hours      Vital Signs Last 24 Hrs  T(C): 36.5 (01 Dec 2019 03:57), Max: 36.8 (30 Nov 2019 13:32)  T(F): 97.7 (01 Dec 2019 03:57), Max: 98.3 (30 Nov 2019 13:32)  HR: 85 (01 Dec 2019 03:57) (71 - 91)  BP: 113/75 (01 Dec 2019 03:57) (113/75 - 138/89)  BP(mean): --  RR: 17 (01 Dec 2019 03:57) (17 - 18)  SpO2: 97% (01 Dec 2019 03:57) (97% - 99%)    Physical Exam:  General:    NAD,  non toxic  Head: atraumatic, normocephalic  Eye: normal sclera and conjunctiva  ENT:    no oropharyngeal lesions,   no LAD,   neck supple  Cardio:     regular S1, S2,  no murmur  Respiratory:    clear b/l,    no wheezing  abd:     soft,   BS +,   no tenderness,    no organomegaly  :   no CVAT,  no suprapubic tenderness,   no  blackman  Musculoskeletal:   tenderness on the lower back   vascular: no phlebitis, normal pulses  Skin:    no rash  Neurologic:     no focal deficit  psych: normal affect                          9.4    4.90  )-----------( 329      ( 01 Dec 2019 06:13 )             29.8                   MICROBIOLOGY:  v      Rapid RVP Result: NotDetec (11-25 @ 20:54)        RADIOLOGY:  Images below reviewed personally  < from: MR Lumbar Spine w/wo IV Cont (11.29.19 @ 23:49) >  Impression: There is a large fluid collection with the measurements given   above in the subcutaneous fat. The patient has undergone a laminectomy   between the prior CT of 8/28/2018 and the current evaluation at the L5-S1   level. That operation and associated imaging was not done in this   hospital. The collection described does not communicate or extend into   the paraspinal musculature or into the region of the spinal canal. It   does enhance around its rim. The fat on the T1-weighted sequences around   the region appears pristine. This collection may likely be a benign fluid   collection however, possibility of an infected collection is not entirely   excluded. Clinical correlation is necessary.

## 2019-12-01 NOTE — PROGRESS NOTE ADULT - SUBJECTIVE AND OBJECTIVE BOX
INTERVAL HPI/OVERNIGHT EVENTS: i feel fine.   Vital Signs Last 24 Hrs  T(C): 36.3 (01 Dec 2019 12:21), Max: 36.7 (30 Nov 2019 21:15)  T(F): 97.3 (01 Dec 2019 12:21), Max: 98.1 (30 Nov 2019 21:15)  HR: 82 (01 Dec 2019 12:21) (82 - 91)  BP: 107/69 (01 Dec 2019 12:21) (107/69 - 138/89)  BP(mean): --  RR: 18 (01 Dec 2019 12:21) (17 - 18)  SpO2: 97% (01 Dec 2019 12:21) (97% - 97%)  I&O's Summary    30 Nov 2019 07:01  -  01 Dec 2019 07:00  --------------------------------------------------------  IN: 920 mL / OUT: 0 mL / NET: 920 mL      MEDICATIONS  (STANDING):  benzonatate 100 milliGRAM(s) Oral three times a day  dextrose 5% + sodium chloride 0.9% 1000 milliLiter(s) (100 mL/Hr) IV Continuous <Continuous>  influenza   Vaccine 0.5 milliLiter(s) IntraMuscular once  lidocaine   Patch 1 Patch Transdermal daily  pantoprazole    Tablet 40 milliGRAM(s) Oral two times a day  pregabalin 150 milliGRAM(s) Oral every 8 hours    MEDICATIONS  (PRN):  guaiFENesin   Syrup  (Sugar-Free) 100 milliGRAM(s) Oral every 6 hours PRN Cough  oxycodone    5 mG/acetaminophen 325 mG 2 Tablet(s) Oral every 6 hours PRN Moderate Pain (4 - 6)    LABS:                        9.4    4.90  )-----------( 329      ( 01 Dec 2019 06:13 )             29.8           PT/INR - ( 01 Dec 2019 06:13 )   PT: 11.6 sec;   INR: 1.01 ratio             CAPILLARY BLOOD GLUCOSE              REVIEW OF SYSTEMS:  CONSTITUTIONAL: No fever, weight loss, or fatigue  EYES: No eye pain, visual disturbances, or discharge  ENMT:  No difficulty hearing, tinnitus, vertigo; No sinus or throat pain  NECK: No pain or stiffness  RESPIRATORY: No cough, wheezing, chills or hemoptysis; No shortness of breath  CARDIOVASCULAR: No chest pain, palpitations, dizziness, or leg swelling  GASTROINTESTINAL: No abdominal or epigastric pain. No nausea, vomiting, or hematemesis; No diarrhea or constipation. No melena or hematochezia.  GENITOURINARY: No dysuria, frequency, hematuria, or incontinence  NEUROLOGICAL: No headaches, memory loss, loss of strength, numbness, or tremors    Consultant(s) Notes Reviewed:  [x ] YES  [ ] NO    PHYSICAL EXAM:  GENERAL: NAD, well-groomed, well-developed,not in any distress ,  HEAD:  Atraumatic, Normocephalic  EYES: EOMI, PERRLA, conjunctiva and sclera clear  ENMT: No tonsillar erythema, exudates, or enlargement; Moist mucous membranes, Good dentition, No lesions  NECK: Supple, No JVD, Normal thyroid  NERVOUS SYSTEM:  Alert & Oriented X3, rt radiculopathy   CHEST/LUNG: Good air entry bilateral with no  rales, rhonchi, wheezing, or rubs  HEART: Regular rate and rhythm; No murmurs, rubs, or gallops  ABDOMEN: Soft, Nontender, Nondistended; Bowel sounds present  EXTREMITIES:  2+ Peripheral Pulses, No clubbing, cyanosis, or edema  SKIN: No rashes or lesions    Care Discussed with Consultants/Other Providers [ x] YES  [ ] NO

## 2019-12-02 DIAGNOSIS — K21.9 GASTRO-ESOPHAGEAL REFLUX DISEASE WITHOUT ESOPHAGITIS: ICD-10-CM

## 2019-12-02 DIAGNOSIS — R05 COUGH: ICD-10-CM

## 2019-12-02 LAB
HCT VFR BLD CALC: 30.7 % — LOW (ref 34.5–45)
HGB BLD-MCNC: 9.3 G/DL — LOW (ref 11.5–15.5)
MCHC RBC-ENTMCNC: 26.5 PG — LOW (ref 27–34)
MCHC RBC-ENTMCNC: 30.3 GM/DL — LOW (ref 32–36)
MCV RBC AUTO: 87.5 FL — SIGNIFICANT CHANGE UP (ref 80–100)
PLATELET # BLD AUTO: 323 K/UL — SIGNIFICANT CHANGE UP (ref 150–400)
RBC # BLD: 3.51 M/UL — LOW (ref 3.8–5.2)
RBC # FLD: 21.1 % — HIGH (ref 10.3–14.5)
WBC # BLD: 5.18 K/UL — SIGNIFICANT CHANGE UP (ref 3.8–10.5)
WBC # FLD AUTO: 5.18 K/UL — SIGNIFICANT CHANGE UP (ref 3.8–10.5)

## 2019-12-02 PROCEDURE — 99254 IP/OBS CNSLTJ NEW/EST MOD 60: CPT | Mod: 25

## 2019-12-02 PROCEDURE — 31575 DIAGNOSTIC LARYNGOSCOPY: CPT

## 2019-12-02 PROCEDURE — 99232 SBSQ HOSP IP/OBS MODERATE 35: CPT

## 2019-12-02 RX ORDER — SODIUM CHLORIDE 0.65 %
1 AEROSOL, SPRAY (ML) NASAL EVERY 6 HOURS
Refills: 0 | Status: DISCONTINUED | OUTPATIENT
Start: 2019-12-02 | End: 2019-12-13

## 2019-12-02 RX ORDER — OXYCODONE AND ACETAMINOPHEN 5; 325 MG/1; MG/1
2 TABLET ORAL ONCE
Refills: 0 | Status: DISCONTINUED | OUTPATIENT
Start: 2019-12-02 | End: 2019-12-02

## 2019-12-02 RX ORDER — FLUTICASONE PROPIONATE 50 MCG
1 SPRAY, SUSPENSION NASAL
Refills: 0 | Status: DISCONTINUED | OUTPATIENT
Start: 2019-12-02 | End: 2019-12-11

## 2019-12-02 RX ORDER — BENZOCAINE AND MENTHOL 5; 1 G/100ML; G/100ML
1 LIQUID ORAL
Refills: 0 | Status: DISCONTINUED | OUTPATIENT
Start: 2019-12-02 | End: 2019-12-13

## 2019-12-02 RX ORDER — OXYCODONE AND ACETAMINOPHEN 5; 325 MG/1; MG/1
2 TABLET ORAL EVERY 6 HOURS
Refills: 0 | Status: DISCONTINUED | OUTPATIENT
Start: 2019-12-02 | End: 2019-12-09

## 2019-12-02 RX ADMIN — OXYCODONE AND ACETAMINOPHEN 2 TABLET(S): 5; 325 TABLET ORAL at 01:31

## 2019-12-02 RX ADMIN — BENZOCAINE AND MENTHOL 1 LOZENGE: 5; 1 LIQUID ORAL at 17:44

## 2019-12-02 RX ADMIN — SODIUM CHLORIDE 100 MILLILITER(S): 9 INJECTION, SOLUTION INTRAVENOUS at 13:54

## 2019-12-02 RX ADMIN — LIDOCAINE 1 PATCH: 4 CREAM TOPICAL at 13:54

## 2019-12-02 RX ADMIN — PANTOPRAZOLE SODIUM 40 MILLIGRAM(S): 20 TABLET, DELAYED RELEASE ORAL at 05:27

## 2019-12-02 RX ADMIN — OXYCODONE AND ACETAMINOPHEN 2 TABLET(S): 5; 325 TABLET ORAL at 12:22

## 2019-12-02 RX ADMIN — OXYCODONE AND ACETAMINOPHEN 2 TABLET(S): 5; 325 TABLET ORAL at 00:57

## 2019-12-02 RX ADMIN — Medication 100 MILLIGRAM(S): at 00:57

## 2019-12-02 RX ADMIN — SODIUM CHLORIDE 100 MILLILITER(S): 9 INJECTION, SOLUTION INTRAVENOUS at 17:46

## 2019-12-02 RX ADMIN — BENZOCAINE AND MENTHOL 1 LOZENGE: 5; 1 LIQUID ORAL at 07:03

## 2019-12-02 RX ADMIN — Medication 100 MILLIGRAM(S): at 13:53

## 2019-12-02 RX ADMIN — Medication 0.5 MILLIGRAM(S): at 11:21

## 2019-12-02 RX ADMIN — PANTOPRAZOLE SODIUM 40 MILLIGRAM(S): 20 TABLET, DELAYED RELEASE ORAL at 17:25

## 2019-12-02 RX ADMIN — Medication 100 MILLIGRAM(S): at 21:36

## 2019-12-02 RX ADMIN — Medication 100 MILLIGRAM(S): at 06:27

## 2019-12-02 RX ADMIN — Medication 1 SPRAY(S): at 23:51

## 2019-12-02 RX ADMIN — OXYCODONE AND ACETAMINOPHEN 2 TABLET(S): 5; 325 TABLET ORAL at 06:27

## 2019-12-02 RX ADMIN — OXYCODONE AND ACETAMINOPHEN 2 TABLET(S): 5; 325 TABLET ORAL at 18:40

## 2019-12-02 RX ADMIN — Medication 150 MILLIGRAM(S): at 05:27

## 2019-12-02 RX ADMIN — OXYCODONE AND ACETAMINOPHEN 2 TABLET(S): 5; 325 TABLET ORAL at 17:40

## 2019-12-02 RX ADMIN — LIDOCAINE 1 PATCH: 4 CREAM TOPICAL at 21:37

## 2019-12-02 RX ADMIN — Medication 1 SPRAY(S): at 17:40

## 2019-12-02 RX ADMIN — Medication 150 MILLIGRAM(S): at 13:53

## 2019-12-02 RX ADMIN — Medication 1 SPRAY(S): at 18:56

## 2019-12-02 RX ADMIN — Medication 150 MILLIGRAM(S): at 21:36

## 2019-12-02 RX ADMIN — Medication 100 MILLIGRAM(S): at 05:27

## 2019-12-02 RX ADMIN — Medication 100 MILLIGRAM(S): at 23:52

## 2019-12-02 RX ADMIN — OXYCODONE AND ACETAMINOPHEN 2 TABLET(S): 5; 325 TABLET ORAL at 07:00

## 2019-12-02 RX ADMIN — OXYCODONE AND ACETAMINOPHEN 2 TABLET(S): 5; 325 TABLET ORAL at 11:22

## 2019-12-02 RX ADMIN — OXYCODONE AND ACETAMINOPHEN 2 TABLET(S): 5; 325 TABLET ORAL at 23:51

## 2019-12-02 NOTE — CONSULT NOTE ADULT - PROBLEM SELECTOR RECOMMENDATION 2
likely 2nd post nasal gtt  -CT chest clear  -No history of asthma   -Start Flonase, nasal saline  -f/u ENT recs likely 2nd post nasal gtt  -CT chest clear  -No history of asthma   -Start Flonase, nasal saline  -f/u ENT recs  -Tessalon TID PRN

## 2019-12-02 NOTE — CONSULT NOTE ADULT - ASSESSMENT
44 y/o F with PMH of fibromyalgia, anemia, gastric bypass in 2006 presents here with one day of BRBPR and vaginal bleeding. Incidentally noted spinal fluid collection. Called to eval for cough x 4 weeks. Patient was sick with URI 4 weeks ago and has had persistent productive cough since then. Completed course of Azithromycin and then Amoxicillin as outpt, most recent abx was 2 weeks ago. Patient endorses white/clear phlegm with cough but has turned yellow since last week. +post nasal gtt, +L maxillary sinus pain

## 2019-12-02 NOTE — PROGRESS NOTE ADULT - SUBJECTIVE AND OBJECTIVE BOX
43y old  Female who presents with a chief complaint of bleeding per rectum (02 Dec 2019 15:14)      Interval history:  Afebrile, back pain unchanged, still with cough. Pt was unable to get the aspiration performed today, will need it rescheduled with anesthesia.       Allergies:   IV Contrast (Hives)  shellfish (Hives; Rash)      Antimicrobials:      REVIEW OF SYSTEMS:  No chest pain   No SOB  No N/V  No dysuria   No rash.       Vital Signs Last 24 Hrs  T(C): 36.7 (12-02-19 @ 10:00), Max: 36.7 (12-01-19 @ 21:21)  T(F): 98 (12-02-19 @ 10:00), Max: 98 (12-01-19 @ 21:21)  HR: 70 (12-02-19 @ 10:00) (69 - 90)  BP: 118/70 (12-02-19 @ 10:00) (109/71 - 138/85)  BP(mean): --  RR: 18 (12-02-19 @ 10:00) (18 - 18)  SpO2: 97% (12-02-19 @ 10:00) (96% - 97%)      PHYSICAL EXAM:  Patient in no acute distress. AAOX3.  No icterus, no oral ulcers.  Cardiovascular: S1S2 normal.  Lungs: + air entry B/L lung fields.  Gastrointestinal: soft, nontender, nondistended.  Extremities: no edema.  IV sites not inflamed.                           9.3    5.18  )-----------( 323      ( 02 Dec 2019 08:04 )             30.7         Culture - Blood (collected 30 Nov 2019 15:05)  Source: .Blood Blood-Peripheral  Preliminary Report (01 Dec 2019 16:01):    No growth to date.      Radiology:  < from: MR Lumbar Spine w/wo IV Cont (11.29.19 @ 23:49) >  Impression: There is a large fluid collection with the measurements given   above in the subcutaneous fat. The patient has undergone a laminectomy   between the prior CT of 8/28/2018 and the current evaluation at the L5-S1   level. That operation and associated imaging was not done in this   hospital. The collection described does not communicate or extend into   the paraspinal musculature or into the region of the spinal canal. It   does enhance around its rim. The fat on the T1-weighted sequences around   the region appears pristine. This collection may likely be a benign fluid   collection however, possibility of an infected collection is not entirely   excluded. Clinical correlation is necessary.

## 2019-12-02 NOTE — CONSULT NOTE ADULT - SUBJECTIVE AND OBJECTIVE BOX
CC: cough/ sinusitis     HPI: 44 y/o F with PMH of fibromyalgia, anemia, gastric bypass 06 admitted with gi/vag bleeding and incidentally noted spinal fluid collection. Patient was sick with URI 4 weeks ago and has had persistent productive cough since then. Completed course of Azithromycin and then Amoxicillin as outpt, most recent abx was 2 weeks ago. Patient endorses white/clear phlegm with cough but has turned yellow since last week. +post nasal gtt, +L maxillary sinus pain. 98% on RA. PT denies dysphagia, odynophagia, dysphonia, sob, dyspnea, changes in voice or inability to tolerated secretions.     PAST MEDICAL & SURGICAL HISTORY:  Lupus  Anemia  GI bleed  Joint effusion  Herniated disc, cervical: c6-c7  Lumbar disc herniation: L4-L5, BULGING DISC AT L5-S1  Polyarthralgia  Fibromyalgia  Ectopic pregnancy: 2, both ruptured  Ovarian cyst rupture  H/O abdominoplasty  Internal hernia: s/p repair - 2011  Complication following molar or ectopic pregnancy: bilateral salpingectomy  H/O gastric bypass: June 2009    Allergies    IV Contrast (Hives)  shellfish (Hives; Rash)    Intolerances      MEDICATIONS  (STANDING):  benzocaine 15 mG/menthol 3.6 mG Lozenge 1 Lozenge Oral two times a day  benzonatate 100 milliGRAM(s) Oral three times a day  dextrose 5% + sodium chloride 0.9% 1000 milliLiter(s) (100 mL/Hr) IV Continuous <Continuous>  fluticasone propionate 50 MICROgram(s)/spray Nasal Spray 1 Spray(s) Both Nostrils two times a day  influenza   Vaccine 0.5 milliLiter(s) IntraMuscular once  lidocaine   Patch 1 Patch Transdermal daily  pantoprazole    Tablet 40 milliGRAM(s) Oral two times a day  pregabalin 150 milliGRAM(s) Oral every 8 hours  sodium chloride 0.65% Nasal 1 Spray(s) Both Nostrils every 6 hours    MEDICATIONS  (PRN):  guaiFENesin   Syrup  (Sugar-Free) 100 milliGRAM(s) Oral every 6 hours PRN Cough  oxycodone    5 mG/acetaminophen 325 mG 2 Tablet(s) Oral every 6 hours PRN Moderate Pain (4 - 6)      Social History: no tobacco, no etoh     Family history: Pt denies any sign FHx    ROS:   ENT: all negative except as noted in HPI   CV: denies palpitations  Pulm: denies SOB, cough, hemoptysis  GI: denies change in apetite, indigestion, n/v  : denies pertinent urinary symptoms, urgency  Neuro: denies numbness/tingling, loss of sensation  Psych: denies anxiety  MS: denies muscle weakness, instability  Heme: denies easy bruising or bleeding  Endo: denies heat/cold intolerance, excessive sweating  Vascular: denies LE edema    Vital Signs Last 24 Hrs  T(C): 36.7 (02 Dec 2019 10:00), Max: 36.7 (01 Dec 2019 21:21)  T(F): 98 (02 Dec 2019 10:00), Max: 98 (01 Dec 2019 21:21)  HR: 70 (02 Dec 2019 10:00) (69 - 90)  BP: 118/70 (02 Dec 2019 10:00) (109/71 - 138/85)  BP(mean): --  RR: 18 (02 Dec 2019 10:00) (18 - 18)  SpO2: 97% (02 Dec 2019 10:00) (96% - 97%)                          9.3    5.18  )-----------( 323      ( 02 Dec 2019 08:04 )             30.7         PT/INR - ( 01 Dec 2019 06:13 )   PT: 11.6 sec;   INR: 1.01 ratio             PHYSICAL EXAM:  Gen: NAD  Skin: No rashes, bruises, or lesions  Head: Normocephalic, Atraumatic  Face: no edema, erythema, or fluctuance. Parotid glands soft without mass  Eyes: no scleral injection  Nose: Nares bilaterally patent, no discharge  Mouth: No Stridor / Drooling / Trismus.  Mucosa moist, tongue/uvula midline, oropharynx clear  Neck: Flat, supple, no lymphadenopathy, trachea midline, no masses  Lymphatic: No lymphadenopathy  Resp: breathing easily, no stridor  CV: no peripheral edema/cyanosis  GI: nondistended   Peripheral vascular: no JVD or edema  Neuro: facial nerve intact, no facial droop        Diagnostic Nasal Endoscopy: (Scope #2 used)    Fiberoptic Indirect laryngoscopy:  (Scope #2 used)        IMAGING/ADDITIONAL STUDIES: CC: cough/ sinusitis     HPI: 44 y/o F with PMH of fibromyalgia, anemia, gastric bypass 06 admitted with gi/vag bleeding and incidentally noted spinal fluid collection. Patient was sick with URI 4 weeks ago and has had persistent productive cough since then. Completed course of Azithromycin and then Amoxicillin as outpt, most recent abx was 2 weeks ago. Patient endorses white/clear phlegm with cough but has turned yellow since last week. +post nasal gtt, +L maxillary sinus pain. 98% on RA. PT denies dysphagia, odynophagia, dysphonia, sob, dyspnea, changes in voice or inability to tolerated secretions.     PAST MEDICAL & SURGICAL HISTORY:  Lupus  Anemia  GI bleed  Joint effusion  Herniated disc, cervical: c6-c7  Lumbar disc herniation: L4-L5, BULGING DISC AT L5-S1  Polyarthralgia  Fibromyalgia  Ectopic pregnancy: 2, both ruptured  Ovarian cyst rupture  H/O abdominoplasty  Internal hernia: s/p repair - 2011  Complication following molar or ectopic pregnancy: bilateral salpingectomy  H/O gastric bypass: June 2009    Allergies    IV Contrast (Hives)  shellfish (Hives; Rash)    Intolerances      MEDICATIONS  (STANDING):  benzocaine 15 mG/menthol 3.6 mG Lozenge 1 Lozenge Oral two times a day  benzonatate 100 milliGRAM(s) Oral three times a day  dextrose 5% + sodium chloride 0.9% 1000 milliLiter(s) (100 mL/Hr) IV Continuous <Continuous>  fluticasone propionate 50 MICROgram(s)/spray Nasal Spray 1 Spray(s) Both Nostrils two times a day  influenza   Vaccine 0.5 milliLiter(s) IntraMuscular once  lidocaine   Patch 1 Patch Transdermal daily  pantoprazole    Tablet 40 milliGRAM(s) Oral two times a day  pregabalin 150 milliGRAM(s) Oral every 8 hours  sodium chloride 0.65% Nasal 1 Spray(s) Both Nostrils every 6 hours    MEDICATIONS  (PRN):  guaiFENesin   Syrup  (Sugar-Free) 100 milliGRAM(s) Oral every 6 hours PRN Cough  oxycodone    5 mG/acetaminophen 325 mG 2 Tablet(s) Oral every 6 hours PRN Moderate Pain (4 - 6)      Social History: no tobacco, no etoh     Family history: Pt denies any sign FHx    ROS:   ENT: all negative except as noted in HPI   CV: denies palpitations  Pulm: denies SOB, cough, hemoptysis  GI: denies change in apetite, indigestion, n/v  : denies pertinent urinary symptoms, urgency  Neuro: denies numbness/tingling, loss of sensation  Psych: denies anxiety  MS: denies muscle weakness, instability  Heme: denies easy bruising or bleeding  Endo: denies heat/cold intolerance, excessive sweating  Vascular: denies LE edema    Vital Signs Last 24 Hrs  T(C): 36.7 (02 Dec 2019 10:00), Max: 36.7 (01 Dec 2019 21:21)  T(F): 98 (02 Dec 2019 10:00), Max: 98 (01 Dec 2019 21:21)  HR: 70 (02 Dec 2019 10:00) (69 - 90)  BP: 118/70 (02 Dec 2019 10:00) (109/71 - 138/85)  BP(mean): --  RR: 18 (02 Dec 2019 10:00) (18 - 18)  SpO2: 97% (02 Dec 2019 10:00) (96% - 97%)                          9.3    5.18  )-----------( 323      ( 02 Dec 2019 08:04 )             30.7         PT/INR - ( 01 Dec 2019 06:13 )   PT: 11.6 sec;   INR: 1.01 ratio             PHYSICAL EXAM:  Gen: NAD  Skin: No rashes, bruises, or lesions  Head: Normocephalic, Atraumatic  Face: no edema, erythema, or fluctuance. Parotid glands soft without mass  Eyes: no scleral injection  Nose: Nares bilaterally patent, no discharge  Mouth: No Stridor / Drooling / Trismus.  Mucosa moist, tongue/uvula midline, oropharynx clear  Neck: Flat, supple, no lymphadenopathy, trachea midline, no masses  Lymphatic: No lymphadenopathy  Resp: breathing easily, no stridor  CV: no peripheral edema/cyanosis        Fiberoptic Indirect laryngoscopy:  (Scope #2 used) Reason for Laryngoscopy:    Patient was unable to cooperate with mirror.  Nasopharynx, oropharynx, and hypopharynx clear, no bleeding. Tongue base, posterior pharyngeal wall, vallecula, epiglottis, and subglottis appear normal. No erythema, edema, pooling of secretions, masses or lesions. Airway patent, no foreign body visualized. No glottic/supraglottic edema. True vocal cords, vestibular folds, ventricles, pyriform sinuses, and aryepiglottic folds appear normal bilaterally. Vocal cords mobile with good contact b/l. . posterior arytenoid edema noted on indirect laryngoscopy, likely 2/2 GERD

## 2019-12-02 NOTE — CONSULT NOTE ADULT - PROBLEM SELECTOR RECOMMENDATION 9
r/o bacterial sinusitis   -L maxillary sinus pain with yellow phlegm/post nasal gtt  -ENT consult called  -Nasal saline q6  -Flonase BID r/o bacterial sinusitis   -L maxillary sinus pain with yellow phlegm/post nasal gtt  -ENT consult called  -Nasal saline q6  -Flonase BID  -If ENT no sinusitis noted, consider CT Sinus/maxillary to evaluate if dental abscess is present.

## 2019-12-02 NOTE — PROGRESS NOTE ADULT - ASSESSMENT
2yo F pmhx of fibromyalgia, anemia, gastric bypass in 2006 at [facility], she was admitted to [place] surgery in 2015 with epigastric pain, found to have ulcer at the gastric staple line, managed with PPI, then discharged, presents here with one day of BRBPR.     Pt with intermittent low grade temp, Abnormal CT, concern for post op spinal/ soft tissue infection.       Plan:   Given pt hemodynamically stable, hold off on initiating abx at this time for optimal microbiologic diagnosis   MRI thoracic, LS spine REVIEWED, THE COLLECTION DOES NOT APPEAR TO BE RIM ENHANCING.   ESR/CRP almost normal, blood cx NTD, all of this points towards non infectious collection, ? spinal leak.   Planned  for aspiration of the fluid for Bacterial, AFB, Fungal cx with anesthesia.   s/p Neurosurgery eval

## 2019-12-02 NOTE — CONSULT NOTE ADULT - PROBLEM SELECTOR RECOMMENDATION 9
- continue PPI x 1 month   - Patient should follow up in ENT office as an outpatient. May see Dr. Perez or Von. Call [phone].

## 2019-12-02 NOTE — PROGRESS NOTE ADULT - SUBJECTIVE AND OBJECTIVE BOX
INTERVAL HPI/OVERNIGHT EVENTS: Seen and examined in IR area. Couldn't do her procedure.   Vital Signs Last 24 Hrs  T(C): 36.7 (02 Dec 2019 10:00), Max: 36.7 (01 Dec 2019 21:21)  T(F): 98 (02 Dec 2019 10:00), Max: 98 (01 Dec 2019 21:21)  HR: 70 (02 Dec 2019 10:00) (69 - 90)  BP: 118/70 (02 Dec 2019 10:00) (109/71 - 138/85)  BP(mean): --  RR: 18 (02 Dec 2019 10:00) (18 - 18)  SpO2: 97% (02 Dec 2019 10:00) (96% - 97%)  I&O's Summary    01 Dec 2019 07:01  -  02 Dec 2019 07:00  --------------------------------------------------------  IN: 200 mL / OUT: 0 mL / NET: 200 mL    02 Dec 2019 07:01  -  02 Dec 2019 16:28  --------------------------------------------------------  IN: 860 mL / OUT: 0 mL / NET: 860 mL      MEDICATIONS  (STANDING):  benzocaine 15 mG/menthol 3.6 mG Lozenge 1 Lozenge Oral two times a day  benzonatate 100 milliGRAM(s) Oral three times a day  dextrose 5% + sodium chloride 0.9% 1000 milliLiter(s) (100 mL/Hr) IV Continuous <Continuous>  fluticasone propionate 50 MICROgram(s)/spray Nasal Spray 1 Spray(s) Both Nostrils two times a day  influenza   Vaccine 0.5 milliLiter(s) IntraMuscular once  lidocaine   Patch 1 Patch Transdermal daily  pantoprazole    Tablet 40 milliGRAM(s) Oral two times a day  pregabalin 150 milliGRAM(s) Oral every 8 hours  sodium chloride 0.65% Nasal 1 Spray(s) Both Nostrils every 6 hours    MEDICATIONS  (PRN):  guaiFENesin   Syrup  (Sugar-Free) 100 milliGRAM(s) Oral every 6 hours PRN Cough  oxycodone    5 mG/acetaminophen 325 mG 2 Tablet(s) Oral every 6 hours PRN Moderate Pain (4 - 6)    LABS:                        9.3    5.18  )-----------( 323      ( 02 Dec 2019 08:04 )             30.7           PT/INR - ( 01 Dec 2019 06:13 )   PT: 11.6 sec;   INR: 1.01 ratio             CAPILLARY BLOOD GLUCOSE              REVIEW OF SYSTEMS:  CONSTITUTIONAL: No fever, weight loss, or fatigue  EYES: No eye pain, visual disturbances, or discharge  ENMT:  No difficulty hearing, tinnitus, vertigo; No sinus or throat pain  NECK: No pain or stiffness  BREASTS: No pain, masses, or nipple discharge  RESPIRATORY:  cough, but no wheezing, chills or hemoptysis; No shortness of breath  CARDIOVASCULAR: No chest pain, palpitations, dizziness, or leg swelling  GASTROINTESTINAL: No abdominal or epigastric pain. No nausea, vomiting, or hematemesis; No diarrhea or constipation. No melena or hematochezia.  GENITOURINARY: No dysuria, frequency, hematuria, or incontinence  NEUROLOGICAL: No headaches, memory loss, loss of strength, numbness, or tremors      Consultant(s) Notes Reviewed:  [x ] YES  [ ] NO    PHYSICAL EXAM:  GENERAL: NAD, well-groomed, well-developed ,not in any distress ,  HEAD:  Atraumatic, Normocephalic  EYES: EOMI, PERRLA, conjunctiva and sclera clear  ENMT: No tonsillar erythema, exudates, or enlargement; Moist mucous membranes, Good dentition, No lesions  NECK: Supple, No JVD, Normal thyroid  NERVOUS SYSTEM:  Alert & Oriented X3, No focal deficit   CHEST/LUNG: Good air entry bilateral with no  rales, rhonchi, wheezing, or rubs  HEART: Regular rate and rhythm; No murmurs, rubs, or gallops  ABDOMEN: Soft, Nontender, Nondistended; Bowel sounds present  EXTREMITIES:  2+ Peripheral Pulses, No clubbing, cyanosis, or edema    Care Discussed with Consultants/Other Providers [ x] YES  [ ] NO

## 2019-12-02 NOTE — CONSULT NOTE ADULT - ASSESSMENT
43y with persistent productive cough and L sinus pressure x 4 weeks- pending indirect laryngoscopy 43y with persistent productive cough and L sinus pressure x 4 weeks, posterior arytenoid edema noted on indirect laryngoscopy, likely 2/2 GERD

## 2019-12-02 NOTE — CONSULT NOTE ADULT - ATTENDING COMMENTS
Shahida Rao  Pager: [phone]. If no response or past 5 pm call [phone].    Please call ID service for questions over weekend at [phone].
pt seen and examined  cough likely from Upper respiratory, ? Sinusitis vs Dental issues  based on ENT eval may need CT sinus

## 2019-12-02 NOTE — PROGRESS NOTE ADULT - ASSESSMENT
42yo F pmhx of fibromyalgia, anemia, gastric bypass in 2006 at [facility], she was admitted to [facility] in 2015 with epigastric pain, found to have ulcer at the gastric staple line, managed with PPI, then discharged, presents here with one day of BRBPR. No history of constipation, she had hemorrhoids when she was pregnant but resolved, no surgeries for hemorrhoids, no chronic anal pain. She reports that after the BRBPR yesterday 1x episode, later in day she had a vaginal bleeding (Believes this is period, right time and has been "bleeding heavily with periods for 4 months". Feeling weaker more sob with bleeding. Does not take motrin, does not smoke.      Problem/Plan - 1:  ·  Problem: GIB (gastrointestinal bleeding).  Plan: Hemodynamically stable. GI and Colorectal help appreciated. S/P EGD and Colonoscopy.      Problem/Plan - 2:  ·  Problem: Anemia due to acute blood loss.  Plan: Transfusing to keep Hgb>9 G . IV iron and cbc stable.      Problem/Plan - 3:  ·  Problem:  Fever and chills with persistent cough .  Plan: RVP ,Chest Xray noted  and cultures noted.  No Leucocytosis . CT chest noted.     < from: CT Chest No Cont (11.28.19 @ 16:27) >  IMPRESSION:     Clear lungs.    3.5 x 2.5 cm incompletely imaged subcutaneous fluid collection with   fistulous tract to the skin surface within the mid lower back at the   level of L1 vertebral body. Recommend correlation with physical exam.    These findings were discussed NP Xavier by Dr. Boykin on 11/29/19 10:22   AM.    < end of copied text >  ID abd Surgery consulted.      Problem/Plan - 4:  ·  Problem: Back pain with radiculopathy with Fluid Collection .  Plan: H/O Surgeries and will f/u with her spinal surgeon.   Nsx consult noted. MRI < from: MR Lumbar Spine w/wo IV Cont (11.29.19 @ 23:49) >  Impression: There is a large fluid collection with the measurements given   above in the subcutaneous fat. The patient has undergone a laminectomy   between the prior CT of 8/28/2018 and the current evaluation at the L5-S1   level. That operation and associated imaging was not done in this   hospital. The collection described does not communicate or extend into   the paraspinal musculature or into the region of the spinal canal. It   does enhance around its rim. The fat on the T1-weighted sequences around   the region appears pristine. This collection may likely be a benign fluid   collection however, possibility of an infected collection is not entirely   excluded. Clinical correlation is necessary.    < end of copied text >  and Aspiration by IR to be repeated. .     Problem/Plan - 5:  ·  Problem: Fibromyalgia.  Plan: Home meds.      Problem/Plan - 6:  Problem: Obesity. Plan: H/O Gastric Bypass and Bariatric surgery follow up noted.

## 2019-12-02 NOTE — CONSULT NOTE ADULT - SUBJECTIVE AND OBJECTIVE BOX
PULMONARY CONSULT    HPI: 42 y/o F with PMH of fibromyalgia, anemia, gastric bypass in 2006 presents here with one day of BRBPR and vaginal bleeding. Incidentally noted spinal fluid collection. Called to eval for cough x 4 weeks. Patient was sick with URI 4 weeks ago and has had persistent productive cough since then. Completed course of Azithromycin and then Amoxicillin as outpt, most recent abx was 2 weeks ago. Patient endorses white/clear phlegm with cough but has turned yellow since last week. +post nasal gtt, +L maxillary sinus pain. 98% on RA      PAST MEDICAL & SURGICAL HISTORY:  Lupus  Anemia  GI bleed  Joint effusion  Herniated disc, cervical: c6-c7  Lumbar disc herniation: L4-L5, BULGING DISC AT L5-S1  Polyarthralgia  Fibromyalgia  Ectopic pregnancy: 2, both ruptured  Ovarian cyst rupture  H/O abdominoplasty  Internal hernia: s/p repair - 2011  Complication following molar or ectopic pregnancy: bilateral salpingectomy  H/O gastric bypass: June 2009    Allergies    IV Contrast (Hives)  shellfish (Hives; Rash)    Intolerances      FAMILY HISTORY:  No pertinent family history in first degree relatives    Social history: Never smoker     Review of Systems:  CONSTITUTIONAL: No fever, chills, or fatigue  EYES: No eye pain, visual disturbances, or discharge  ENMT:  No difficulty hearing, tinnitus, vertigo; No sinus or throat pain  NECK: No pain or stiffness  RESPIRATORY: Per above  CARDIOVASCULAR: No chest pain, palpitations, dizziness, or leg swelling  GASTROINTESTINAL: No abdominal or epigastric pain. No nausea, vomiting, or hematemesis; No diarrhea or constipation. No melena or hematochezia.  GENITOURINARY: No dysuria, frequency, hematuria, or incontinence  NEUROLOGICAL: No headaches, memory loss, loss of strength, numbness, or tremors  SKIN: No itching, burning, rashes, or lesions   MUSCULOSKELETAL: No joint pain or swelling; No muscle, back, or extremity pain  PSYCHIATRIC: No depression, anxiety, mood swings, or difficulty sleeping      Medications:  MEDICATIONS  (STANDING):  benzocaine 15 mG/menthol 3.6 mG Lozenge 1 Lozenge Oral two times a day  benzonatate 100 milliGRAM(s) Oral three times a day  dextrose 5% + sodium chloride 0.9% 1000 milliLiter(s) (100 mL/Hr) IV Continuous <Continuous>  fluticasone propionate 50 MICROgram(s)/spray Nasal Spray 1 Spray(s) Both Nostrils two times a day  influenza   Vaccine 0.5 milliLiter(s) IntraMuscular once  lidocaine   Patch 1 Patch Transdermal daily  pantoprazole    Tablet 40 milliGRAM(s) Oral two times a day  pregabalin 150 milliGRAM(s) Oral every 8 hours  sodium chloride 0.65% Nasal 1 Spray(s) Both Nostrils every 6 hours    MEDICATIONS  (PRN):  guaiFENesin   Syrup  (Sugar-Free) 100 milliGRAM(s) Oral every 6 hours PRN Cough  oxycodone    5 mG/acetaminophen 325 mG 2 Tablet(s) Oral every 6 hours PRN Moderate Pain (4 - 6)            Vital Signs Last 24 Hrs  T(C): 36.7 (02 Dec 2019 10:00), Max: 36.7 (01 Dec 2019 21:21)  T(F): 98 (02 Dec 2019 10:00), Max: 98 (01 Dec 2019 21:21)  HR: 70 (02 Dec 2019 10:00) (69 - 90)  BP: 118/70 (02 Dec 2019 10:00) (109/71 - 138/85)  BP(mean): --  RR: 18 (02 Dec 2019 10:00) (18 - 18)  SpO2: 97% (02 Dec 2019 10:00) (96% - 97%) on RA            12-01 @ 07:01  -  12-02 @ 07:00  --------------------------------------------------------  IN: 200 mL / OUT: 0 mL / NET: 200 mL          LABS:                        9.3    5.18  )-----------( 323      ( 02 Dec 2019 08:04 )             30.7                 CAPILLARY BLOOD GLUCOSE        PT/INR - ( 01 Dec 2019 06:13 )   PT: 11.6 sec;   INR: 1.01 ratio                           CULTURES: (if applicable)  Culture Results:   No growth to date. (11-30 @ 15:05)        Physical Examination:    General: No acute distress.      HEENT: Pupils equal, reactive to light.  Symmetric.    PULM: Clear to auscultation bilaterally, no significant sputum production    CVS: S1, S2    ABD: Soft, nondistended, nontender, normoactive bowel sounds, no masses    EXT: No edema, nontender    SKIN: Warm and well perfused, no rashes noted.    NEURO: Alert, oriented, interactive, nonfocal    RADIOLOGY REVIEWED  CT chest: < from: CT Chest No Cont (11.28.19 @ 16:27) >  FINDINGS:    LUNGS AND AIRWAYS: The lungs are clear. Patent central airways.     PLEURA: No pleural effusion.    MEDIASTINUM AND EVELIA: No lymphadenopathy.    VESSELS: Within normal limits.    HEART: Heart size is normal. No pericardial effusion. Minimal LAD   calcific atherosclerosis.    CHEST WALL AND LOWER NECK: Bilateral breast implants.     VISUALIZED UPPER ABDOMEN: Status post gastric bypass. Punctate   nonobstructing left renal stone(series 2 image 160). 3.5 x 2.5 cm   incompletely imaged subcutaneous fluid collection with fistulous tract to   the skin surface within the mid lower back at the level of L1 vertebral   body. Recommend correlation with physical exam.    BONES: Within normal limits.    IMPRESSION:     Clear lungs.    3.5 x 2.5 cm incompletely imaged subcutaneous fluid collection with   fistulous tract to the skin surface within the mid lower back at the   level of L1 vertebral body. Recommend correlation with physical exam.    < end of copied text > PULMONARY CONSULT    HPI: 44 y/o F with PMH of fibromyalgia, anemia, gastric bypass in 2006 presents here with one day of BRBPR and vaginal bleeding. Incidentally noted spinal fluid collection. Called to eval for cough x 4 weeks. Patient was sick with URI 4 weeks ago and has had persistent productive cough since then. Completed course of Azithromycin and then Amoxicillin as outpt, most recent abx was 2 weeks ago. Patient endorses white/clear phlegm with cough but has turned yellow since last week. +post nasal gtt, +L maxillary sinus pain. 98% on RA    Pt also states over past few months having Left upper dental problems with cap broken.     PAST MEDICAL & SURGICAL HISTORY:  Lupus  Anemia  GI bleed  Joint effusion  Herniated disc, cervical: c6-c7  Lumbar disc herniation: L4-L5, BULGING DISC AT L5-S1  Polyarthralgia  Fibromyalgia  Ectopic pregnancy: 2, both ruptured  Ovarian cyst rupture  H/O abdominoplasty  Internal hernia: s/p repair - 2011  Complication following molar or ectopic pregnancy: bilateral salpingectomy  H/O gastric bypass: June 2009    Allergies    IV Contrast (Hives)  shellfish (Hives; Rash)    Intolerances      FAMILY HISTORY:  No pertinent family history in first degree relatives    Social history: Never smoker     Review of Systems:  CONSTITUTIONAL: No fever, chills, or fatigue  EYES: No eye pain, visual disturbances, or discharge  ENMT:  No difficulty hearing, tinnitus, vertigo; No sinus or throat pain  NECK: No pain or stiffness  RESPIRATORY: Per above  CARDIOVASCULAR: No chest pain, palpitations, dizziness, or leg swelling  GASTROINTESTINAL: No abdominal or epigastric pain. No nausea, vomiting, or hematemesis; No diarrhea or constipation. No melena or hematochezia.  GENITOURINARY: No dysuria, frequency, hematuria, or incontinence  NEUROLOGICAL: No headaches, memory loss, loss of strength, numbness, or tremors  SKIN: No itching, burning, rashes, or lesions   MUSCULOSKELETAL: No joint pain or swelling; No muscle, back, or extremity pain  PSYCHIATRIC: No depression, anxiety, mood swings, or difficulty sleeping      Medications:  MEDICATIONS  (STANDING):  benzocaine 15 mG/menthol 3.6 mG Lozenge 1 Lozenge Oral two times a day  benzonatate 100 milliGRAM(s) Oral three times a day  dextrose 5% + sodium chloride 0.9% 1000 milliLiter(s) (100 mL/Hr) IV Continuous <Continuous>  fluticasone propionate 50 MICROgram(s)/spray Nasal Spray 1 Spray(s) Both Nostrils two times a day  influenza   Vaccine 0.5 milliLiter(s) IntraMuscular once  lidocaine   Patch 1 Patch Transdermal daily  pantoprazole    Tablet 40 milliGRAM(s) Oral two times a day  pregabalin 150 milliGRAM(s) Oral every 8 hours  sodium chloride 0.65% Nasal 1 Spray(s) Both Nostrils every 6 hours    MEDICATIONS  (PRN):  guaiFENesin   Syrup  (Sugar-Free) 100 milliGRAM(s) Oral every 6 hours PRN Cough  oxycodone    5 mG/acetaminophen 325 mG 2 Tablet(s) Oral every 6 hours PRN Moderate Pain (4 - 6)            Vital Signs Last 24 Hrs  T(C): 36.7 (02 Dec 2019 10:00), Max: 36.7 (01 Dec 2019 21:21)  T(F): 98 (02 Dec 2019 10:00), Max: 98 (01 Dec 2019 21:21)  HR: 70 (02 Dec 2019 10:00) (69 - 90)  BP: 118/70 (02 Dec 2019 10:00) (109/71 - 138/85)  BP(mean): --  RR: 18 (02 Dec 2019 10:00) (18 - 18)  SpO2: 97% (02 Dec 2019 10:00) (96% - 97%) on RA            12-01 @ 07:01  -  12-02 @ 07:00  --------------------------------------------------------  IN: 200 mL / OUT: 0 mL / NET: 200 mL          LABS:                        9.3    5.18  )-----------( 323      ( 02 Dec 2019 08:04 )             30.7                 CAPILLARY BLOOD GLUCOSE        PT/INR - ( 01 Dec 2019 06:13 )   PT: 11.6 sec;   INR: 1.01 ratio                           CULTURES: (if applicable)  Culture Results:   No growth to date. (11-30 @ 15:05)        Physical Examination:    General: No acute distress.      HEENT: Pupils equal, reactive to light.  Symmetric.    PULM: Clear to auscultation bilaterally, no significant sputum production    CVS: S1, S2 no murm    ABD: Soft, nondistended, nontender, normoactive bowel sounds, no masses    EXT: No edema, nontender    SKIN: Warm and well perfused, no rashes noted.    NEURO: Alert, oriented, interactive, nonfocal    RADIOLOGY REVIEWED  CT chest: < from: CT Chest No Cont (11.28.19 @ 16:27) >  FINDINGS:    LUNGS AND AIRWAYS: The lungs are clear. Patent central airways.     PLEURA: No pleural effusion.    MEDIASTINUM AND EVELIA: No lymphadenopathy.    VESSELS: Within normal limits.    HEART: Heart size is normal. No pericardial effusion. Minimal LAD   calcific atherosclerosis.    CHEST WALL AND LOWER NECK: Bilateral breast implants.     VISUALIZED UPPER ABDOMEN: Status post gastric bypass. Punctate   nonobstructing left renal stone(series 2 image 160). 3.5 x 2.5 cm   incompletely imaged subcutaneous fluid collection with fistulous tract to   the skin surface within the mid lower back at the level of L1 vertebral   body. Recommend correlation with physical exam.    BONES: Within normal limits.    IMPRESSION:     Clear lungs.    3.5 x 2.5 cm incompletely imaged subcutaneous fluid collection with   fistulous tract to the skin surface within the mid lower back at the   level of L1 vertebral body. Recommend correlation with physical exam.    < end of copied text >

## 2019-12-03 LAB
GRAM STN FLD: SIGNIFICANT CHANGE UP
SPECIMEN SOURCE: SIGNIFICANT CHANGE UP

## 2019-12-03 PROCEDURE — 99232 SBSQ HOSP IP/OBS MODERATE 35: CPT

## 2019-12-03 RX ADMIN — Medication 1 SPRAY(S): at 17:13

## 2019-12-03 RX ADMIN — LIDOCAINE 1 PATCH: 4 CREAM TOPICAL at 21:17

## 2019-12-03 RX ADMIN — OXYCODONE AND ACETAMINOPHEN 2 TABLET(S): 5; 325 TABLET ORAL at 05:53

## 2019-12-03 RX ADMIN — Medication 150 MILLIGRAM(S): at 21:16

## 2019-12-03 RX ADMIN — LIDOCAINE 1 PATCH: 4 CREAM TOPICAL at 01:00

## 2019-12-03 RX ADMIN — OXYCODONE AND ACETAMINOPHEN 2 TABLET(S): 5; 325 TABLET ORAL at 18:50

## 2019-12-03 RX ADMIN — Medication 100 MILLIGRAM(S): at 21:16

## 2019-12-03 RX ADMIN — LIDOCAINE 1 PATCH: 4 CREAM TOPICAL at 10:22

## 2019-12-03 RX ADMIN — Medication 100 MILLIGRAM(S): at 13:16

## 2019-12-03 RX ADMIN — Medication 1 SPRAY(S): at 23:04

## 2019-12-03 RX ADMIN — BENZOCAINE AND MENTHOL 1 LOZENGE: 5; 1 LIQUID ORAL at 17:12

## 2019-12-03 RX ADMIN — OXYCODONE AND ACETAMINOPHEN 2 TABLET(S): 5; 325 TABLET ORAL at 12:50

## 2019-12-03 RX ADMIN — Medication 1 SPRAY(S): at 17:12

## 2019-12-03 RX ADMIN — PANTOPRAZOLE SODIUM 40 MILLIGRAM(S): 20 TABLET, DELAYED RELEASE ORAL at 05:16

## 2019-12-03 RX ADMIN — PANTOPRAZOLE SODIUM 40 MILLIGRAM(S): 20 TABLET, DELAYED RELEASE ORAL at 17:12

## 2019-12-03 RX ADMIN — LIDOCAINE 1 PATCH: 4 CREAM TOPICAL at 19:50

## 2019-12-03 RX ADMIN — Medication 1 SPRAY(S): at 05:14

## 2019-12-03 RX ADMIN — OXYCODONE AND ACETAMINOPHEN 2 TABLET(S): 5; 325 TABLET ORAL at 18:18

## 2019-12-03 RX ADMIN — Medication 100 MILLIGRAM(S): at 05:14

## 2019-12-03 RX ADMIN — OXYCODONE AND ACETAMINOPHEN 2 TABLET(S): 5; 325 TABLET ORAL at 12:19

## 2019-12-03 RX ADMIN — Medication 1 SPRAY(S): at 11:39

## 2019-12-03 RX ADMIN — Medication 150 MILLIGRAM(S): at 11:40

## 2019-12-03 RX ADMIN — OXYCODONE AND ACETAMINOPHEN 2 TABLET(S): 5; 325 TABLET ORAL at 00:25

## 2019-12-03 RX ADMIN — BENZOCAINE AND MENTHOL 1 LOZENGE: 5; 1 LIQUID ORAL at 05:16

## 2019-12-03 NOTE — PROGRESS NOTE ADULT - SUBJECTIVE AND OBJECTIVE BOX
43y old  Female who presents with a chief complaint of bleeding per rectum (03 Dec 2019 12:23)      Interval history:  Afebrile, cough improved, planned for aspiration tomorrow. Otherwise walking around, still with back pain though.       Allergies:   IV Contrast (Hives)  shellfish (Hives; Rash)      Antimicrobials:      REVIEW OF SYSTEMS:  No chest pain   No SOB  No N/V  No dysuria   No rash.       Vital Signs Last 24 Hrs  T(C): 36.5 (12-03-19 @ 14:40), Max: 36.9 (12-03-19 @ 04:38)  T(F): 97.7 (12-03-19 @ 14:40), Max: 98.5 (12-03-19 @ 04:38)  HR: 85 (12-03-19 @ 14:40) (77 - 85)  BP: 114/76 (12-03-19 @ 14:40) (109/68 - 129/84)  BP(mean): --  RR: 18 (12-03-19 @ 14:40) (18 - 18)  SpO2: 98% (12-03-19 @ 14:40) (96% - 100%)      PHYSICAL EXAM:  Patient in no acute distress. AAOX3.  No icterus, no oral ulcers.  Cardiovascular: S1S2 normal.  Lungs: + air entry B/L lung fields.  Gastrointestinal: soft, nontender, nondistended.  Extremities: no edema.  IV sites not inflamed.                             9.3    5.18  )-----------( 323      ( 02 Dec 2019 08:04 )             30.7         Culture - Sputum (collected 03 Dec 2019 09:33)  Source: .Sputum Sputum  Gram Stain (03 Dec 2019 13:38):    Numerous polymorphonuclear leukocytes per low power field    Few Squamous epithelial cells per low power field    Moderate Gram Positive Cocci in Pairs and Chains per oil power field    Few Gram Negative Rods per oil power field    Culture - Blood (collected 30 Nov 2019 15:05)  Source: .Blood Blood-Peripheral  Preliminary Report (01 Dec 2019 16:01):    No growth to date.

## 2019-12-03 NOTE — PROGRESS NOTE ADULT - SUBJECTIVE AND OBJECTIVE BOX
INTERVAL HPI/OVERNIGHT EVENTS: ENT said cough sec to GERD.  Vital Signs Last 24 Hrs  T(C): 36.9 (03 Dec 2019 04:38), Max: 36.9 (03 Dec 2019 04:38)  T(F): 98.5 (03 Dec 2019 04:38), Max: 98.5 (03 Dec 2019 04:38)  HR: 77 (03 Dec 2019 04:38) (77 - 83)  BP: 109/68 (03 Dec 2019 04:38) (109/68 - 129/84)  BP(mean): --  RR: 18 (03 Dec 2019 04:38) (18 - 18)  SpO2: 96% (03 Dec 2019 04:38) (96% - 100%)  I&O's Summary    02 Dec 2019 07:01  -  03 Dec 2019 07:00  --------------------------------------------------------  IN: 1480 mL / OUT: 0 mL / NET: 1480 mL      MEDICATIONS  (STANDING):  benzocaine 15 mG/menthol 3.6 mG Lozenge 1 Lozenge Oral two times a day  benzonatate 100 milliGRAM(s) Oral three times a day  dextrose 5% + sodium chloride 0.9% 1000 milliLiter(s) (100 mL/Hr) IV Continuous <Continuous>  fluticasone propionate 50 MICROgram(s)/spray Nasal Spray 1 Spray(s) Both Nostrils two times a day  influenza   Vaccine 0.5 milliLiter(s) IntraMuscular once  lidocaine   Patch 1 Patch Transdermal daily  pantoprazole    Tablet 40 milliGRAM(s) Oral two times a day  sodium chloride 0.65% Nasal 1 Spray(s) Both Nostrils every 6 hours    MEDICATIONS  (PRN):  guaiFENesin   Syrup  (Sugar-Free) 100 milliGRAM(s) Oral every 6 hours PRN Cough  oxycodone    5 mG/acetaminophen 325 mG 2 Tablet(s) Oral every 6 hours PRN Moderate Pain (4 - 6)    LABS:                        9.3    5.18  )-----------( 323      ( 02 Dec 2019 08:04 )             30.7               CAPILLARY BLOOD GLUCOSE              REVIEW OF SYSTEMS:  CONSTITUTIONAL: No fever, weight loss, or fatigue  EYES: No eye pain, visual disturbances, or discharge  ENMT:  No difficulty hearing, tinnitus, vertigo; No sinus or throat pain  NECK: No pain or stiffness  RESPIRATORY:  cough, but no  wheezing, chills or hemoptysis; No shortness of breath  CARDIOVASCULAR: No chest pain, palpitations, dizziness, or leg swelling  GASTROINTESTINAL: No abdominal or epigastric pain. No nausea, vomiting, or hematemesis; No diarrhea or constipation. No melena or hematochezia.  GENITOURINARY: No dysuria, frequency, hematuria, or incontinence  NEUROLOGICAL: No headaches, memory loss, loss of strength, numbness, or tremors      Consultant(s) Notes Reviewed:  [x ] YES  [ ] NO    PHYSICAL EXAM:  GENERAL: NAD, well-groomed, well-developed,not in any distress ,  HEAD:  Atraumatic, Normocephalic  EYES: EOMI, PERRLA, conjunctiva and sclera clear  ENMT: No tonsillar erythema, exudates, or enlargement; Moist mucous membranes, Good dentition, No lesions  NECK: Supple, No JVD, Normal thyroid  NERVOUS SYSTEM:  Alert & Oriented X3, No focal deficit   CHEST/LUNG: Good air entry bilateral with no  rales, rhonchi, wheezing, or rubs  HEART: Regular rate and rhythm; No murmurs, rubs, or gallops  ABDOMEN: Soft, Nontender, Nondistended; Bowel sounds present  EXTREMITIES:  2+ Peripheral Pulses, No clubbing, cyanosis, or edema  SKIN: No rashes or lesions    Care Discussed with Consultants/Other Providers [ x] YES  [ ] NO

## 2019-12-03 NOTE — PROGRESS NOTE ADULT - ASSESSMENT
42 y/o F with PMH of fibromyalgia, anemia, gastric bypass in 2006 presents here with one day of BRBPR and vaginal bleeding. Incidentally noted spinal fluid collection. Called to eval for cough x 4 weeks. Patient was sick with URI 4 weeks ago and has had persistent productive cough since then. Completed course of Azithromycin and then Amoxicillin as outpt, most recent abx was 2 weeks ago. Patient endorses white/clear phlegm with cough but has turned yellow since last week. ENT eval called-evidence of GERD related inflammation. No evidence of bacterial sinusitis

## 2019-12-03 NOTE — PROGRESS NOTE ADULT - ASSESSMENT
4yo F pmhx of fibromyalgia, anemia, gastric bypass in 2006 at [facility], she was admitted to [facility] in [facility] with epigastric pain, found to have ulcer at the gastric staple line, managed with PPI, then discharged, presents here with one day of BRBPR.     Pt with intermittent low grade temp, Abnormal CT, concern for post op spinal/ soft tissue infection. Cough improved.       Plan:   Given pt hemodynamically stable, hold off on initiating abx at this time for optimal microbiologic diagnosis   MRI thoracic, LS spine REVIEWED, THE COLLECTION DOES NOT APPEAR TO BE RIM ENHANCING.   ESR/CRP almost normal, blood cx NTD, all of this points towards non infectious collection, ? spinal leak.   Planned  for aspiration of the fluid for Bacterial, AFB, Fungal cx with anesthesia tomorrow.   s/p Neurosurgery eval

## 2019-12-03 NOTE — PHARMACOTHERAPY INTERVENTION NOTE - COMMENTS
Patient was on lyrica Q8H, but was auto-discontinued yesterday. Recommendation made to resume lyrica.    Doron Todd, PharmD  [phone]

## 2019-12-03 NOTE — PROGRESS NOTE ADULT - SUBJECTIVE AND OBJECTIVE BOX
Follow-up Pulm Progress Note    No new respiratory events overnight.  Denies SOB/CP.   Cough slightly improved, still productive   97% on RA    Medications:  MEDICATIONS  (STANDING):  benzocaine 15 mG/menthol 3.6 mG Lozenge 1 Lozenge Oral two times a day  benzonatate 100 milliGRAM(s) Oral three times a day  dextrose 5% + sodium chloride 0.9% 1000 milliLiter(s) (100 mL/Hr) IV Continuous <Continuous>  fluticasone propionate 50 MICROgram(s)/spray Nasal Spray 1 Spray(s) Both Nostrils two times a day  influenza   Vaccine 0.5 milliLiter(s) IntraMuscular once  lidocaine   Patch 1 Patch Transdermal daily  pantoprazole    Tablet 40 milliGRAM(s) Oral two times a day  pregabalin 150 milliGRAM(s) Oral every 8 hours  sodium chloride 0.65% Nasal 1 Spray(s) Both Nostrils every 6 hours    MEDICATIONS  (PRN):  guaiFENesin   Syrup  (Sugar-Free) 100 milliGRAM(s) Oral every 6 hours PRN Cough  oxycodone    5 mG/acetaminophen 325 mG 2 Tablet(s) Oral every 6 hours PRN Moderate Pain (4 - 6)          Vital Signs Last 24 Hrs  T(C): 36.9 (03 Dec 2019 04:38), Max: 36.9 (03 Dec 2019 04:38)  T(F): 98.5 (03 Dec 2019 04:38), Max: 98.5 (03 Dec 2019 04:38)  HR: 77 (03 Dec 2019 04:38) (77 - 83)  BP: 109/68 (03 Dec 2019 04:38) (109/68 - 129/84)  BP(mean): --  RR: 18 (03 Dec 2019 04:38) (18 - 18)  SpO2: 96% (03 Dec 2019 04:38) (96% - 100%) on RA          12-02 @ 07:01  -  12-03 @ 07:00  --------------------------------------------------------  IN: 1480 mL / OUT: 0 mL / NET: 1480 mL          LABS:                        9.3    5.18  )-----------( 323      ( 02 Dec 2019 08:04 )             30.7                 CAPILLARY BLOOD GLUCOSE                            CULTURES: (if applicable)  Culture Results:   No growth to date. (11-30 @ 15:05)    Most recent blood culture -- 11-30 @ 15:05   -- -- .Blood Blood-Peripheral 11-30 @ 15:05        Physical Examination:  PULM: Clear to auscultation bilaterally, no significant sputum production  CVS: S1, S2 heard    RADIOLOGY REVIEWED  CT chest: < from: CT Chest No Cont (11.28.19 @ 16:27) >  FINDINGS:    LUNGS AND AIRWAYS: The lungs are clear. Patent central airways.     PLEURA: No pleural effusion.    MEDIASTINUM AND EVELIA: No lymphadenopathy.    VESSELS: Within normal limits.    HEART: Heart size is normal. No pericardial effusion. Minimal LAD   calcific atherosclerosis.    CHEST WALL AND LOWER NECK: Bilateral breast implants.     VISUALIZED UPPER ABDOMEN: Status post gastric bypass. Punctate   nonobstructing left renal stone(series 2 image 160). 3.5 x 2.5 cm   incompletely imaged subcutaneous fluid collection with fistulous tract to   the skin surface within the mid lower back at the level of L1 vertebral   body. Recommend correlation with physical exam.    BONES: Within normal limits.    IMPRESSION:     Clear lungs.    3.5 x 2.5 cm incompletely imaged subcutaneous fluid collection with   fistulous tract to the skin surface within the mid lower back at the   level of L1 vertebral body. Recommend correlation with physical exam.    < end of copied text >

## 2019-12-04 ENCOUNTER — APPOINTMENT (OUTPATIENT)
Dept: CT IMAGING | Facility: HOSPITAL | Age: 44
End: 2019-12-04

## 2019-12-04 LAB
B PERT IGG+IGM PNL SER: ABNORMAL
COLOR FLD: YELLOW — SIGNIFICANT CHANGE UP
FLUID INTAKE SUBSTANCE CLASS: SIGNIFICANT CHANGE UP
FLUID SEGMENTED GRANULOCYTES: 0 % — SIGNIFICANT CHANGE UP
GLUCOSE FLD-MCNC: 106 MG/DL — SIGNIFICANT CHANGE UP
GRAM STN FLD: SIGNIFICANT CHANGE UP
HCT VFR BLD CALC: 29.6 % — LOW (ref 34.5–45)
HGB BLD-MCNC: 9.1 G/DL — LOW (ref 11.5–15.5)
LYMPHOCYTES # FLD: 88 % — SIGNIFICANT CHANGE UP
MCHC RBC-ENTMCNC: 26.2 PG — LOW (ref 27–34)
MCHC RBC-ENTMCNC: 30.7 GM/DL — LOW (ref 32–36)
MCV RBC AUTO: 85.3 FL — SIGNIFICANT CHANGE UP (ref 80–100)
MONOS+MACROS # FLD: 12 % — SIGNIFICANT CHANGE UP
NRBC # BLD: 0 /100 WBCS — SIGNIFICANT CHANGE UP (ref 0–0)
PLATELET # BLD AUTO: 288 K/UL — SIGNIFICANT CHANGE UP (ref 150–400)
PROT FLD-MCNC: 3.8 G/DL — SIGNIFICANT CHANGE UP
RAPID RVP RESULT: SIGNIFICANT CHANGE UP
RBC # BLD: 3.47 M/UL — LOW (ref 3.8–5.2)
RBC # FLD: 21.5 % — HIGH (ref 10.3–14.5)
RCV VOL RI: 0 /UL — SIGNIFICANT CHANGE UP (ref 0–0)
SPECIMEN SOURCE: SIGNIFICANT CHANGE UP
TOTAL NUCLEATED CELL COUNT, BODY FLUID: 280 /UL — SIGNIFICANT CHANGE UP
TUBE TYPE: SIGNIFICANT CHANGE UP
WBC # BLD: 5.12 K/UL — SIGNIFICANT CHANGE UP (ref 3.8–10.5)
WBC # FLD AUTO: 5.12 K/UL — SIGNIFICANT CHANGE UP (ref 3.8–10.5)

## 2019-12-04 PROCEDURE — 10009 FNA BX W/CT GDN 1ST LES: CPT

## 2019-12-04 RX ORDER — BENZOCAINE AND MENTHOL 5; 1 G/100ML; G/100ML
1 LIQUID ORAL ONCE
Refills: 0 | Status: COMPLETED | OUTPATIENT
Start: 2019-12-04 | End: 2019-12-04

## 2019-12-04 RX ADMIN — Medication 150 MILLIGRAM(S): at 05:43

## 2019-12-04 RX ADMIN — LIDOCAINE 1 PATCH: 4 CREAM TOPICAL at 11:36

## 2019-12-04 RX ADMIN — OXYCODONE AND ACETAMINOPHEN 2 TABLET(S): 5; 325 TABLET ORAL at 00:53

## 2019-12-04 RX ADMIN — Medication 1 SPRAY(S): at 05:43

## 2019-12-04 RX ADMIN — Medication 1 APPLICATION(S): at 21:04

## 2019-12-04 RX ADMIN — PANTOPRAZOLE SODIUM 40 MILLIGRAM(S): 20 TABLET, DELAYED RELEASE ORAL at 18:11

## 2019-12-04 RX ADMIN — Medication 1200 MILLIGRAM(S): at 18:11

## 2019-12-04 RX ADMIN — LIDOCAINE 1 PATCH: 4 CREAM TOPICAL at 23:19

## 2019-12-04 RX ADMIN — PANTOPRAZOLE SODIUM 40 MILLIGRAM(S): 20 TABLET, DELAYED RELEASE ORAL at 05:43

## 2019-12-04 RX ADMIN — BENZOCAINE AND MENTHOL 1 LOZENGE: 5; 1 LIQUID ORAL at 05:42

## 2019-12-04 RX ADMIN — Medication 1 SPRAY(S): at 18:11

## 2019-12-04 RX ADMIN — OXYCODONE AND ACETAMINOPHEN 2 TABLET(S): 5; 325 TABLET ORAL at 18:52

## 2019-12-04 RX ADMIN — Medication 1 SPRAY(S): at 18:23

## 2019-12-04 RX ADMIN — LIDOCAINE 1 PATCH: 4 CREAM TOPICAL at 20:21

## 2019-12-04 RX ADMIN — Medication 150 MILLIGRAM(S): at 18:22

## 2019-12-04 RX ADMIN — Medication 100 MILLIGRAM(S): at 06:22

## 2019-12-04 RX ADMIN — Medication 1 APPLICATION(S): at 05:43

## 2019-12-04 RX ADMIN — Medication 1 SPRAY(S): at 11:32

## 2019-12-04 RX ADMIN — OXYCODONE AND ACETAMINOPHEN 2 TABLET(S): 5; 325 TABLET ORAL at 12:23

## 2019-12-04 RX ADMIN — OXYCODONE AND ACETAMINOPHEN 2 TABLET(S): 5; 325 TABLET ORAL at 06:22

## 2019-12-04 RX ADMIN — Medication 100 MILLIGRAM(S): at 05:43

## 2019-12-04 RX ADMIN — Medication 100 MILLIGRAM(S): at 21:04

## 2019-12-04 RX ADMIN — Medication 1 APPLICATION(S): at 13:09

## 2019-12-04 RX ADMIN — OXYCODONE AND ACETAMINOPHEN 2 TABLET(S): 5; 325 TABLET ORAL at 06:50

## 2019-12-04 RX ADMIN — OXYCODONE AND ACETAMINOPHEN 2 TABLET(S): 5; 325 TABLET ORAL at 13:10

## 2019-12-04 RX ADMIN — OXYCODONE AND ACETAMINOPHEN 2 TABLET(S): 5; 325 TABLET ORAL at 01:25

## 2019-12-04 RX ADMIN — OXYCODONE AND ACETAMINOPHEN 2 TABLET(S): 5; 325 TABLET ORAL at 18:07

## 2019-12-04 NOTE — PROGRESS NOTE ADULT - SUBJECTIVE AND OBJECTIVE BOX
INTERVAL HPI/OVERNIGHT EVENTS: i feel fine.   Vital Signs Last 24 Hrs  T(C): 36.7 (04 Dec 2019 18:08), Max: 36.8 (04 Dec 2019 06:12)  T(F): 98 (04 Dec 2019 18:08), Max: 98.3 (04 Dec 2019 06:12)  HR: 70 (04 Dec 2019 18:08) (70 - 88)  BP: 133/87 (04 Dec 2019 18:08) (113/75 - 142/78)  BP(mean): --  RR: 18 (04 Dec 2019 18:08) (18 - 18)  SpO2: 100% (04 Dec 2019 18:08) (96% - 100%)  I&O's Summary    03 Dec 2019 07:01  -  04 Dec 2019 07:00  --------------------------------------------------------  IN: 1340 mL / OUT: 0 mL / NET: 1340 mL      MEDICATIONS  (STANDING):  benzocaine 15 mG/menthol 3.6 mG Lozenge 1 Lozenge Oral two times a day  benzonatate 100 milliGRAM(s) Oral three times a day  fluticasone propionate 50 MICROgram(s)/spray Nasal Spray 1 Spray(s) Both Nostrils two times a day  guaiFENesin ER 1200 milliGRAM(s) Oral every 12 hours  hydrocodone/homatropine Syrup 5 milliLiter(s) Oral at bedtime  influenza   Vaccine 0.5 milliLiter(s) IntraMuscular once  lidocaine   Patch 1 Patch Transdermal daily  pantoprazole    Tablet 40 milliGRAM(s) Oral two times a day  pregabalin 150 milliGRAM(s) Oral every 8 hours  sodium chloride 0.65% Nasal 1 Spray(s) Both Nostrils every 6 hours  triamcinolone 0.1% Cream 1 Application(s) Topical three times a day    MEDICATIONS  (PRN):  oxycodone    5 mG/acetaminophen 325 mG 2 Tablet(s) Oral every 6 hours PRN Moderate Pain (4 - 6)    LABS:                        9.1    5.12  )-----------( 288      ( 04 Dec 2019 07:11 )             29.6               CAPILLARY BLOOD GLUCOSE              REVIEW OF SYSTEMS:  CONSTITUTIONAL: No fever, weight loss, or fatigue  EYES: No eye pain, visual disturbances, or discharge  ENMT:  No difficulty hearing, tinnitus, vertigo; No sinus or throat pain  NECK: No pain or stiffness  RESPIRATORY: No cough, wheezing, chills or hemoptysis; No shortness of breath  CARDIOVASCULAR: No chest pain, palpitations, dizziness, or leg swelling  GASTROINTESTINAL: No abdominal or epigastric pain. No nausea, vomiting, or hematemesis; No diarrhea or constipation. No melena or hematochezia.  GENITOURINARY: No dysuria, frequency, hematuria, or incontinence  NEUROLOGICAL: No headaches, memory loss, loss of strength, numbness, or tremors      Consultant(s) Notes Reviewed:  [x ] YES  [ ] NO    PHYSICAL EXAM:  GENERAL: NAD, well-groomed, well-developed, not in any distress ,  HEAD:  Atraumatic, Normocephalic  EYES: EOMI, PERRLA, conjunctiva and sclera clear  ENMT: No tonsillar erythema, exudates, or enlargement; Moist mucous membranes, Good dentition, No lesions  NECK: Supple, No JVD, Normal thyroid  NERVOUS SYSTEM:  Alert & Oriented X3, No focal deficit   CHEST/LUNG: Good air entry bilateral with no  rales, rhonchi, wheezing, or rubs  HEART: Regular rate and rhythm; No murmurs, rubs, or gallops  ABDOMEN: Soft, Nontender, Nondistended; Bowel sounds present  EXTREMITIES:  2+ Peripheral Pulses, No clubbing, cyanosis, or edema  SKIN: No rashes or lesions    Care Discussed with Consultants/Other Providers [ x] YES  [ ] NO

## 2019-12-04 NOTE — PROGRESS NOTE ADULT - SUBJECTIVE AND OBJECTIVE BOX
Follow-up Pulm Progress Note    Worsened cough today, productive with white-yellow sputum  c/o sore throat, HA, L maxillary sinus pain   98% on RA    Medications:  MEDICATIONS  (STANDING):  benzocaine 15 mG/menthol 3.6 mG (Sugar-Free) Lozenge 1 Lozenge Oral once  benzocaine 15 mG/menthol 3.6 mG Lozenge 1 Lozenge Oral two times a day  benzonatate 100 milliGRAM(s) Oral three times a day  fluticasone propionate 50 MICROgram(s)/spray Nasal Spray 1 Spray(s) Both Nostrils two times a day  hydrocodone/homatropine Syrup 5 milliLiter(s) Oral at bedtime  influenza   Vaccine 0.5 milliLiter(s) IntraMuscular once  lidocaine   Patch 1 Patch Transdermal daily  pantoprazole    Tablet 40 milliGRAM(s) Oral two times a day  pregabalin 150 milliGRAM(s) Oral every 8 hours  sodium chloride 0.65% Nasal 1 Spray(s) Both Nostrils every 6 hours  triamcinolone 0.1% Cream 1 Application(s) Topical three times a day    MEDICATIONS  (PRN):  guaiFENesin   Syrup  (Sugar-Free) 100 milliGRAM(s) Oral every 6 hours PRN Cough  oxycodone    5 mG/acetaminophen 325 mG 2 Tablet(s) Oral every 6 hours PRN Moderate Pain (4 - 6)          Vital Signs Last 24 Hrs  T(C): 36.8 (04 Dec 2019 06:12), Max: 36.8 (04 Dec 2019 06:12)  T(F): 98.3 (04 Dec 2019 06:12), Max: 98.3 (04 Dec 2019 06:12)  HR: 72 (04 Dec 2019 06:12) (72 - 88)  BP: 113/75 (04 Dec 2019 06:12) (113/75 - 142/78)  BP(mean): --  RR: 18 (04 Dec 2019 06:12) (18 - 18)  SpO2: 98% (04 Dec 2019 06:12) (98% - 98%) on RA          12-03 @ 07:01  -  12-04 @ 07:00  --------------------------------------------------------  IN: 1340 mL / OUT: 0 mL / NET: 1340 mL          LABS:                        9.1    5.12  )-----------( 288      ( 04 Dec 2019 07:11 )             29.6                 CAPILLARY BLOOD GLUCOSE                            CULTURES: (if applicable)  Culture Results:   Normal Respiratory Jessica present (12-03 @ 09:33)  Culture Results:   No growth to date. (11-30 @ 15:05)    Most recent blood culture -- 12-03 @ 09:33   -- -- .Sputum Sputum 12-03 @ 09:33  Most recent blood culture -- 11-30 @ 15:05   -- -- .Blood Blood-Peripheral 11-30 @ 15:05    Blood culture 12-03 @ 09:33  --    Numerous polymorphonuclear leukocytes per low power field  Few Squamous epithelial cells per low power field  Moderate Gram Positive Cocci in Pairs and Chains per oil power field  Few Gram Negative Rods per oil power field  --  --  --    Urine culture    -->      Physical Examination:  PULM: Clear to auscultation bilaterally, no significant sputum production  CVS: S1, S2 heard    RADIOLOGY REVIEWED  CT chest: < from: CT Chest No Cont (11.28.19 @ 16:27) >  FINDINGS:    LUNGS AND AIRWAYS: The lungs are clear. Patent central airways.     PLEURA: No pleural effusion.    MEDIASTINUM AND EVELIA: No lymphadenopathy.    VESSELS: Within normal limits.    HEART: Heart size is normal. No pericardial effusion. Minimal LAD   calcific atherosclerosis.    CHEST WALL AND LOWER NECK: Bilateral breast implants.     VISUALIZED UPPER ABDOMEN: Status post gastric bypass. Punctate   nonobstructing left renal stone(series 2 image 160). 3.5 x 2.5 cm   incompletely imaged subcutaneous fluid collection with fistulous tract to   the skin surface within the mid lower back at the level of L1 vertebral   body. Recommend correlation with physical exam.    BONES: Within normal limits.    IMPRESSION:     Clear lungs.    3.5 x 2.5 cm incompletely imaged subcutaneous fluid collection with   fistulous tract to the skin surface within the mid lower back at the   level of L1 vertebral body. Recommend correlation with physical exam.    < end of copied text >

## 2019-12-04 NOTE — PROGRESS NOTE ADULT - ASSESSMENT
44yo F pmhx of fibromyalgia, anemia, gastric bypass in 2006 at [place] [facility], she was admitted to [facility] in 2015 with epigastric pain, found to have ulcer at the gastric staple line, managed with PPI, then discharged, presents here with one day of BRBPR. No history of constipation, she had hemorrhoids when she was pregnant but resolved, no surgeries for hemorrhoids, no chronic anal pain. She reports that after the BRBPR yesterday 1x episode, later in day she had a vaginal bleeding (Believes this is period, right time and has been "bleeding heavily with periods for 4 months". Feeling weaker more sob with bleeding. Does not take motrin, does not smoke.      Problem/Plan - 1:  ·  Problem: GIB (gastrointestinal bleeding).  Plan: Hemodynamically stable. GI and Colorectal help appreciated. S/P EGD and Colonoscopy.      Problem/Plan - 2:  ·  Problem: Anemia due to acute blood loss.  Plan: Transfusing to keep Hgb>9 G . IV iron and cbc stable.      Problem/Plan - 3:  ·  Problem:  Fever and chills .  Plan: RVP ,Chest Xray noted  and cultures noted.  No Leucocytosis . CT chest noted.     < from: CT Chest No Cont (11.28.19 @ 16:27) >  IMPRESSION:     Clear lungs.    3.5 x 2.5 cm incompletely imaged subcutaneous fluid collection with   fistulous tract to the skin surface within the mid lower back at the   level of L1 vertebral body. Recommend correlation with physical exam.    These findings were discussed NP Xavier by Dr. Boykin on 11/29/19 10:22   AM.    < end of copied text >  ID abd Surgery consulted.      Problem/Plan - 4:  ·  Problem: Back pain with radiculopathy with Fluid Collection .  Plan: H/O Surgeries and will f/u with her spinal surgeon.   Nsx consult noted. MRI < from: MR Lumbar Spine w/wo IV Cont (11.29.19 @ 23:49) >  Impression: There is a large fluid collection with the measurements given   above in the subcutaneous fat. The patient has undergone a laminectomy   between the prior CT of 8/28/2018 and the current evaluation at the L5-S1   level. That operation and associated imaging was not done in this   hospital. The collection described does not communicate or extend into   the paraspinal musculature or into the region of the spinal canal. It   does enhance around its rim. The fat on the T1-weighted sequences around   the region appears pristine. This collection may likely be a benign fluid   collection however, possibility of an infected collection is not entirely   excluded. Clinical correlation is necessary.    < end of copied text >  and Aspiration by IR today of 50 cc clear yellow fluid .      Problem/Plan - 5:  ·  Problem: Chronic Cough . .  Plan: CT chest noted. Pulmonary and ENT help appreciated. Pt already on PPI .     Problem/Plan - 6:  Problem: Obesity. Plan: H/O Gastric Bypass and Bariatric surgery follow up noted.     Problem/Plan - 7:  ·  Problem: Fibromyalgia.  Plan: Home meds.

## 2019-12-05 LAB
ANION GAP SERPL CALC-SCNC: 11 MMOL/L — SIGNIFICANT CHANGE UP (ref 5–17)
BUN SERPL-MCNC: 8 MG/DL — SIGNIFICANT CHANGE UP (ref 7–23)
CALCIUM SERPL-MCNC: 9.7 MG/DL — SIGNIFICANT CHANGE UP (ref 8.4–10.5)
CHLORIDE SERPL-SCNC: 104 MMOL/L — SIGNIFICANT CHANGE UP (ref 96–108)
CO2 SERPL-SCNC: 23 MMOL/L — SIGNIFICANT CHANGE UP (ref 22–31)
CREAT SERPL-MCNC: 0.49 MG/DL — LOW (ref 0.5–1.3)
CULTURE RESULTS: SIGNIFICANT CHANGE UP
CULTURE RESULTS: SIGNIFICANT CHANGE UP
GLUCOSE SERPL-MCNC: 111 MG/DL — HIGH (ref 70–99)
HCT VFR BLD CALC: 30.7 % — LOW (ref 34.5–45)
HGB BLD-MCNC: 9.8 G/DL — LOW (ref 11.5–15.5)
MCHC RBC-ENTMCNC: 26.8 PG — LOW (ref 27–34)
MCHC RBC-ENTMCNC: 31.9 GM/DL — LOW (ref 32–36)
MCV RBC AUTO: 84.1 FL — SIGNIFICANT CHANGE UP (ref 80–100)
NRBC # BLD: 0 /100 WBCS — SIGNIFICANT CHANGE UP (ref 0–0)
PLATELET # BLD AUTO: 271 K/UL — SIGNIFICANT CHANGE UP (ref 150–400)
POTASSIUM SERPL-MCNC: 4.3 MMOL/L — SIGNIFICANT CHANGE UP (ref 3.5–5.3)
POTASSIUM SERPL-SCNC: 4.3 MMOL/L — SIGNIFICANT CHANGE UP (ref 3.5–5.3)
RBC # BLD: 3.65 M/UL — LOW (ref 3.8–5.2)
RBC # FLD: 22 % — HIGH (ref 10.3–14.5)
SODIUM SERPL-SCNC: 138 MMOL/L — SIGNIFICANT CHANGE UP (ref 135–145)
SPECIMEN SOURCE: SIGNIFICANT CHANGE UP
SPECIMEN SOURCE: SIGNIFICANT CHANGE UP
SYNOVIAL CRYSTALS CLARITY: CLEAR — SIGNIFICANT CHANGE UP
SYNOVIAL CRYSTALS COLOR: YELLOW
SYNOVIAL CRYSTALS ID: SIGNIFICANT CHANGE UP
SYNOVIAL CRYSTALS TUBE: SIGNIFICANT CHANGE UP
WBC # BLD: 5.72 K/UL — SIGNIFICANT CHANGE UP (ref 3.8–10.5)
WBC # FLD AUTO: 5.72 K/UL — SIGNIFICANT CHANGE UP (ref 3.8–10.5)

## 2019-12-05 PROCEDURE — 99232 SBSQ HOSP IP/OBS MODERATE 35: CPT

## 2019-12-05 PROCEDURE — 70486 CT MAXILLOFACIAL W/O DYE: CPT | Mod: 26

## 2019-12-05 RX ORDER — VANCOMYCIN HCL 1 G
1000 VIAL (EA) INTRAVENOUS EVERY 8 HOURS
Refills: 0 | Status: DISCONTINUED | OUTPATIENT
Start: 2019-12-05 | End: 2019-12-06

## 2019-12-05 RX ORDER — ENOXAPARIN SODIUM 100 MG/ML
40 INJECTION SUBCUTANEOUS DAILY
Refills: 0 | Status: DISCONTINUED | OUTPATIENT
Start: 2019-12-05 | End: 2019-12-13

## 2019-12-05 RX ORDER — VANCOMYCIN HCL 1 G
1000 VIAL (EA) INTRAVENOUS EVERY 12 HOURS
Refills: 0 | Status: DISCONTINUED | OUTPATIENT
Start: 2019-12-05 | End: 2019-12-05

## 2019-12-05 RX ORDER — CEFEPIME 1 G/1
2000 INJECTION, POWDER, FOR SOLUTION INTRAMUSCULAR; INTRAVENOUS EVERY 12 HOURS
Refills: 0 | Status: DISCONTINUED | OUTPATIENT
Start: 2019-12-05 | End: 2019-12-09

## 2019-12-05 RX ADMIN — Medication 1 APPLICATION(S): at 13:46

## 2019-12-05 RX ADMIN — CEFEPIME 100 MILLIGRAM(S): 1 INJECTION, POWDER, FOR SOLUTION INTRAMUSCULAR; INTRAVENOUS at 16:07

## 2019-12-05 RX ADMIN — BENZOCAINE AND MENTHOL 1 LOZENGE: 5; 1 LIQUID ORAL at 05:42

## 2019-12-05 RX ADMIN — OXYCODONE AND ACETAMINOPHEN 2 TABLET(S): 5; 325 TABLET ORAL at 01:10

## 2019-12-05 RX ADMIN — OXYCODONE AND ACETAMINOPHEN 2 TABLET(S): 5; 325 TABLET ORAL at 06:42

## 2019-12-05 RX ADMIN — OXYCODONE AND ACETAMINOPHEN 2 TABLET(S): 5; 325 TABLET ORAL at 13:43

## 2019-12-05 RX ADMIN — Medication 1 SPRAY(S): at 11:58

## 2019-12-05 RX ADMIN — Medication 100 MILLIGRAM(S): at 05:42

## 2019-12-05 RX ADMIN — Medication 1 APPLICATION(S): at 21:38

## 2019-12-05 RX ADMIN — Medication 150 MILLIGRAM(S): at 16:18

## 2019-12-05 RX ADMIN — Medication 1 SPRAY(S): at 17:49

## 2019-12-05 RX ADMIN — PANTOPRAZOLE SODIUM 40 MILLIGRAM(S): 20 TABLET, DELAYED RELEASE ORAL at 16:19

## 2019-12-05 RX ADMIN — OXYCODONE AND ACETAMINOPHEN 2 TABLET(S): 5; 325 TABLET ORAL at 07:10

## 2019-12-05 RX ADMIN — Medication 1 APPLICATION(S): at 05:43

## 2019-12-05 RX ADMIN — PANTOPRAZOLE SODIUM 40 MILLIGRAM(S): 20 TABLET, DELAYED RELEASE ORAL at 05:42

## 2019-12-05 RX ADMIN — Medication 1 SPRAY(S): at 05:43

## 2019-12-05 RX ADMIN — Medication 1 SPRAY(S): at 17:48

## 2019-12-05 RX ADMIN — OXYCODONE AND ACETAMINOPHEN 2 TABLET(S): 5; 325 TABLET ORAL at 18:31

## 2019-12-05 RX ADMIN — Medication 100 MILLIGRAM(S): at 14:01

## 2019-12-05 RX ADMIN — OXYCODONE AND ACETAMINOPHEN 2 TABLET(S): 5; 325 TABLET ORAL at 00:41

## 2019-12-05 RX ADMIN — LIDOCAINE 1 PATCH: 4 CREAM TOPICAL at 11:58

## 2019-12-05 RX ADMIN — Medication 1 SPRAY(S): at 02:01

## 2019-12-05 RX ADMIN — Medication 250 MILLIGRAM(S): at 21:38

## 2019-12-05 RX ADMIN — Medication 150 MILLIGRAM(S): at 02:01

## 2019-12-05 RX ADMIN — Medication 150 MILLIGRAM(S): at 09:11

## 2019-12-05 RX ADMIN — OXYCODONE AND ACETAMINOPHEN 2 TABLET(S): 5; 325 TABLET ORAL at 19:00

## 2019-12-05 RX ADMIN — OXYCODONE AND ACETAMINOPHEN 2 TABLET(S): 5; 325 TABLET ORAL at 12:44

## 2019-12-05 RX ADMIN — Medication 1200 MILLIGRAM(S): at 05:42

## 2019-12-05 RX ADMIN — Medication 1 SPRAY(S): at 05:41

## 2019-12-05 RX ADMIN — Medication 1200 MILLIGRAM(S): at 17:49

## 2019-12-05 RX ADMIN — Medication 100 MILLIGRAM(S): at 21:38

## 2019-12-05 RX ADMIN — BENZOCAINE AND MENTHOL 1 LOZENGE: 5; 1 LIQUID ORAL at 17:53

## 2019-12-05 RX ADMIN — LIDOCAINE 1 PATCH: 4 CREAM TOPICAL at 19:55

## 2019-12-05 NOTE — PROGRESS NOTE ADULT - ASSESSMENT
42yo F pmhx of fibromyalgia, anemia, gastric bypass in 2006 at [place], she was admitted to [facility] in 2015 with epigastric pain, found to have ulcer at the gastric staple line, managed with PPI, then discharged, presents here with one day of BRBPR. No history of constipation, she had hemorrhoids when she was pregnant but resolved, no surgeries for hemorrhoids, no chronic anal pain. She reports that after the BRBPR yesterday 1x episode, later in day she had a vaginal bleeding (Believes this is period, right time and has been "bleeding heavily with periods for 4 months". Feeling weaker more sob with bleeding. Does not take motrin, does not smoke.      Problem/Plan - 1:  ·  Problem: GIB (gastrointestinal bleeding).  Plan: Hemodynamically stable. GI and Colorectal help appreciated. S/P EGD and Colonoscopy.      Problem/Plan - 2:  ·  Problem: Anemia due to acute blood loss.  Plan: Transfusing to keep Hgb>9 G . IV iron and cbc stable.      Problem/Plan - 3:  ·  Problem:  Fever and chills .  Plan: RVP ,Chest Xray noted  and cultures noted.  No Leucocytosis . CT chest noted.     < from: CT Chest No Cont (11.28.19 @ 16:27) >  IMPRESSION:     Clear lungs.    3.5 x 2.5 cm incompletely imaged subcutaneous fluid collection with   fistulous tract to the skin surface within the mid lower back at the   level of L1 vertebral body. Recommend correlation with physical exam.    These findings were discussed NP Xavier by Dr. Boykin on 11/29/19 10:22   AM.    < end of copied text >  ID abd Surgery consulted.      Problem/Plan - 4:  ·  Problem: Back pain with radiculopathy with Fluid Collection .  Plan: H/O Surgeries and will f/u with her spinal surgeon.   Nsx consult noted. MRI < from: MR Lumbar Spine w/wo IV Cont (11.29.19 @ 23:49) >  Impression: There is a large fluid collection with the measurements given   above in the subcutaneous fat. The patient has undergone a laminectomy   between the prior CT of 8/28/2018 and the current evaluation at the L5-S1   level. That operation and associated imaging was not done in this   hospital. The collection described does not communicate or extend into   the paraspinal musculature or into the region of the spinal canal. It   does enhance around its rim. The fat on the T1-weighted sequences around   the region appears pristine. This collection may likely be a benign fluid   collection however, possibility of an infected collection is not entirely   excluded. Clinical correlation is necessary.    < end of copied text >  and Aspiration by IR today of 50 cc clear yellow fluid . IV Abxs started by ID . Awaiting final c/s .      Problem/Plan - 5:  ·  Problem: Chronic Cough . .  Plan: CT chest noted. Pulmonary and ENT help appreciated. Pt already on PPI .     Problem/Plan - 6:  Problem: Obesity. Plan: H/O Gastric Bypass and Bariatric surgery follow up noted.     Problem/Plan - 7:  ·  Problem: Fibromyalgia.  Plan: Home meds.

## 2019-12-05 NOTE — PROGRESS NOTE ADULT - SUBJECTIVE AND OBJECTIVE BOX
INTERVAL HPI/OVERNIGHT EVENTS: My back hurting at site of drainage .  Vital Signs Last 24 Hrs  T(C): 36.7 (05 Dec 2019 14:04), Max: 36.7 (05 Dec 2019 06:22)  T(F): 98.1 (05 Dec 2019 14:04), Max: 98.1 (05 Dec 2019 06:22)  HR: 93 (05 Dec 2019 14:04) (70 - 93)  BP: 116/74 (05 Dec 2019 14:04) (105/72 - 116/74)  BP(mean): --  RR: 18 (05 Dec 2019 14:04) (18 - 18)  SpO2: 96% (05 Dec 2019 14:04) (96% - 96%)  I&O's Summary    04 Dec 2019 07:01  -  05 Dec 2019 07:00  --------------------------------------------------------  IN: 240 mL / OUT: 1800 mL / NET: -1560 mL    05 Dec 2019 07:01  -  05 Dec 2019 18:25  --------------------------------------------------------  IN: 1010 mL / OUT: 2600 mL / NET: -1590 mL      MEDICATIONS  (STANDING):  benzocaine 15 mG/menthol 3.6 mG Lozenge 1 Lozenge Oral two times a day  benzonatate 100 milliGRAM(s) Oral three times a day  cefepime   IVPB 2000 milliGRAM(s) IV Intermittent every 12 hours  enoxaparin Injectable 40 milliGRAM(s) SubCutaneous daily  fluticasone propionate 50 MICROgram(s)/spray Nasal Spray 1 Spray(s) Both Nostrils two times a day  guaiFENesin ER 1200 milliGRAM(s) Oral every 12 hours  influenza   Vaccine 0.5 milliLiter(s) IntraMuscular once  lidocaine   Patch 1 Patch Transdermal daily  pantoprazole    Tablet 40 milliGRAM(s) Oral two times a day  pregabalin 150 milliGRAM(s) Oral every 8 hours  sodium chloride 0.65% Nasal 1 Spray(s) Both Nostrils every 6 hours  triamcinolone 0.1% Cream 1 Application(s) Topical three times a day  vancomycin  IVPB 1000 milliGRAM(s) IV Intermittent every 8 hours    MEDICATIONS  (PRN):  hydrocodone/homatropine Syrup 5 milliLiter(s) Oral every 6 hours PRN Cough  oxycodone    5 mG/acetaminophen 325 mG 2 Tablet(s) Oral every 6 hours PRN Moderate Pain (4 - 6)    LABS:                        9.8    5.72  )-----------( 271      ( 05 Dec 2019 07:03 )             30.7     12-05    138  |  104  |  8   ----------------------------<  111<H>  4.3   |  23  |  0.49<L>    Ca    9.7      05 Dec 2019 06:58          CAPILLARY BLOOD GLUCOSE              REVIEW OF SYSTEMS:  CONSTITUTIONAL: No fever, weight loss, or fatigue  EYES: No eye pain, visual disturbances, or discharge  ENMT:  No difficulty hearing, tinnitus, vertigo; No sinus or throat pain  NECK: No pain or stiffness  RESPIRATORY: No cough, wheezing, chills or hemoptysis; No shortness of breath  CARDIOVASCULAR: No chest pain, palpitations, dizziness, or leg swelling  GASTROINTESTINAL: No abdominal or epigastric pain. No nausea, vomiting, or hematemesis; No diarrhea or constipation. No melena or hematochezia.  GENITOURINARY: No dysuria, frequency, hematuria, or incontinence  NEUROLOGICAL: No headaches, memory loss, loss of strength, numbness, or tremors      Consultant(s) Notes Reviewed:  [x ] YES  [ ] NO    PHYSICAL EXAM:  GENERAL: NAD, well-groomed, well-developed,not in any distress ,  HEAD:  Atraumatic, Normocephalic  EYES: EOMI, PERRLA, conjunctiva and sclera clear  ENMT: No tonsillar erythema, exudates, or enlargement; Moist mucous membranes, Good dentition, No lesions  NECK: Supple, No JVD, Normal thyroid  NERVOUS SYSTEM:  Alert & Oriented X3, No new focal deficit   CHEST/LUNG: Good air entry bilateral with no  rales, rhonchi, wheezing, or rubs  HEART: Regular rate and rhythm; No murmurs, rubs, or gallops  ABDOMEN: Soft, Nontender, Nondistended; Bowel sounds present  EXTREMITIES:  2+ Peripheral Pulses, No clubbing, cyanosis, or edema    Care Discussed with Consultants/Other Providers [ x] YES  [ ] NO

## 2019-12-05 NOTE — PROGRESS NOTE ADULT - ASSESSMENT
2yo F pmhx of fibromyalgia, anemia, gastric bypass in 2006 at [place], she was admitted to [facility] in [facility] with epigastric pain, found to have ulcer at the gastric staple line, managed with PPI, then discharged, presents here with one day of BRBPR.     Pt with intermittent low grade temp, Abnormal CT, concern for post op spinal/ soft tissue infection. Cough improved.       Plan:   S/p aspiration of fluid collection in the back, cx NTD  Recommend to initiate Vanco 1 gm iv q12h, cefepime 2 gm iv q12h   follow up fluid cx.   ESR/CRP almost normal, blood cx NTD, all of this points towards non infectious collection, ? spinal leak.   s/p Neurosurgery eval

## 2019-12-05 NOTE — PROGRESS NOTE ADULT - SUBJECTIVE AND OBJECTIVE BOX
Follow-up Pulm Progress Note    No new respiratory events overnight.  Denies SOB/CP.   Cough better with hycodan   c/o pain from spine aspiration    Medications:  MEDICATIONS  (STANDING):  benzocaine 15 mG/menthol 3.6 mG Lozenge 1 Lozenge Oral two times a day  benzonatate 100 milliGRAM(s) Oral three times a day  fluticasone propionate 50 MICROgram(s)/spray Nasal Spray 1 Spray(s) Both Nostrils two times a day  guaiFENesin ER 1200 milliGRAM(s) Oral every 12 hours  influenza   Vaccine 0.5 milliLiter(s) IntraMuscular once  lidocaine   Patch 1 Patch Transdermal daily  pantoprazole    Tablet 40 milliGRAM(s) Oral two times a day  pregabalin 150 milliGRAM(s) Oral every 8 hours  sodium chloride 0.65% Nasal 1 Spray(s) Both Nostrils every 6 hours  triamcinolone 0.1% Cream 1 Application(s) Topical three times a day    MEDICATIONS  (PRN):  hydrocodone/homatropine Syrup 5 milliLiter(s) Oral every 6 hours PRN Cough  oxycodone    5 mG/acetaminophen 325 mG 2 Tablet(s) Oral every 6 hours PRN Moderate Pain (4 - 6)          Vital Signs Last 24 Hrs  T(C): 36.7 (05 Dec 2019 06:22), Max: 36.7 (04 Dec 2019 18:08)  T(F): 98.1 (05 Dec 2019 06:22), Max: 98.1 (05 Dec 2019 06:22)  HR: 70 (05 Dec 2019 06:22) (70 - 70)  BP: 105/72 (05 Dec 2019 06:22) (105/72 - 133/87)  BP(mean): --  RR: 18 (05 Dec 2019 06:22) (18 - 18)  SpO2: 96% (05 Dec 2019 06:22) (96% - 100%) on RA          12-04 @ 07:01  -  12-05 @ 07:00  --------------------------------------------------------  IN: 240 mL / OUT: 1800 mL / NET: -1560 mL          LABS:                        9.8    5.72  )-----------( 271      ( 05 Dec 2019 07:03 )             30.7     12-05    138  |  104  |  8   ----------------------------<  111<H>  4.3   |  23  |  0.49<L>    Ca    9.7      05 Dec 2019 06:58            CAPILLARY BLOOD GLUCOSE                        Fluid characteristics  -- 12-04 @ 17:03  pH --  LDH --  tprot 3.8    Cell count  Appearance Slightly Cloudy  Fluid type --  BF lymph 88  color Yellow  eosinophil --  PMN 0  Mesothelial --  Monocyte 12  Other body cells --      CULTURES: (if applicable)  Culture Results:   Testing in progress (12-04 @ 22:06)  Culture Results:   Normal Respiratory Jessica present (12-03 @ 09:33)  Culture Results:   No growth to date. (11-30 @ 15:05)    Most recent blood culture -- 12-04 @ 22:06   -- -- .Body Fluid Synovial Fluid 12-04 @ 22:06  Most recent blood culture -- 12-03 @ 09:33   -- -- .Sputum Sputum 12-03 @ 09:33  Most recent blood culture -- 11-30 @ 15:05   -- -- .Blood Blood-Peripheral 11-30 @ 15:05    Blood culture 12-04 @ 22:06  --    Rare polymorphonuclear leukocytes per low power field  No organisms seen  --  --  --    Urine culture    -->  Blood culture 12-03 @ 09:33  --    Numerous polymorphonuclear leukocytes per low power field  Few Squamous epithelial cells per low power field  Moderate Gram Positive Cocci in Pairs and Chains per oil power field  Few Gram Negative Rods per oil power field  --  --  --    Urine culture    -->      Physical Examination:  PULM: Clear to auscultation bilaterally, no significant sputum production  CVS: S1, S2 heard    RADIOLOGY REVIEWED  CT chest: < from: CT Chest No Cont (11.28.19 @ 16:27) >  FINDINGS:    LUNGS AND AIRWAYS: The lungs are clear. Patent central airways.     PLEURA: No pleural effusion.    MEDIASTINUM AND EVELIA: No lymphadenopathy.    VESSELS: Within normal limits.    HEART: Heart size is normal. No pericardial effusion. Minimal LAD   calcific atherosclerosis.    CHEST WALL AND LOWER NECK: Bilateral breast implants.     VISUALIZED UPPER ABDOMEN: Status post gastric bypass. Punctate   nonobstructing left renal stone(series 2 image 160). 3.5 x 2.5 cm   incompletely imaged subcutaneous fluid collection with fistulous tract to   the skin surface within the mid lower back at the level of L1 vertebral   body. Recommend correlation with physical exam.    BONES: Within normal limits.    IMPRESSION:     Clear lungs.    3.5 x 2.5 cm incompletely imaged subcutaneous fluid collection with   fistulous tract to the skin surface within the mid lower back at the   level of L1 vertebral body. Recommend correlation with physical exam.

## 2019-12-05 NOTE — PROGRESS NOTE ADULT - SUBJECTIVE AND OBJECTIVE BOX
43y old  Female who presents with a chief complaint of bleeding per rectum (05 Dec 2019 13:29)      Interval history:  Afebrile, back pain slightly more prominent since the procedure. Cough better.       Allergies:   IV Contrast (Hives)  shellfish (Hives; Rash)      Antimicrobials:  cefepime   IVPB 2000 milliGRAM(s) IV Intermittent every 12 hours  vancomycin  IVPB 1000 milliGRAM(s) IV Intermittent every 8 hours      REVIEW OF SYSTEMS:  No chest pain   No SOB  No N/V  No rash.       Vital Signs Last 24 Hrs  T(C): 36.7 (12-05-19 @ 14:04), Max: 36.7 (12-04-19 @ 18:08)  T(F): 98.1 (12-05-19 @ 14:04), Max: 98.1 (12-05-19 @ 06:22)  HR: 93 (12-05-19 @ 14:04) (70 - 93)  BP: 116/74 (12-05-19 @ 14:04) (105/72 - 133/87)  BP(mean): --  RR: 18 (12-05-19 @ 14:04) (18 - 18)  SpO2: 96% (12-05-19 @ 14:04) (96% - 100%)      PHYSICAL EXAM:  Patient in no acute distress. AAOX3.  No icterus, no oral ulcers.  Cardiovascular: S1S2 normal.  Lungs: + air entry B/L lung fields.  Gastrointestinal: soft, nontender, nondistended.  Extremities: no edema.  IV sites not inflamed.                           9.8    5.72  )-----------( 271      ( 05 Dec 2019 07:03 )             30.7   12-05    138  |  104  |  8   ----------------------------<  111<H>  4.3   |  23  |  0.49<L>    Ca    9.7      05 Dec 2019 06:58        Culture - Fungal, Body Fluid (collected 04 Dec 2019 22:06)  Source: .Body Fluid Synovial Fluid  Preliminary Report (05 Dec 2019 11:05):    Testing in progress    Culture - Body Fluid with Gram Stain (collected 04 Dec 2019 22:06)  Source: .Body Fluid Synovial Fluid  Gram Stain (04 Dec 2019 23:17):    Rare polymorphonuclear leukocytes per low power field    No organisms seen  Preliminary Report (05 Dec 2019 15:44):    No growth    Culture - Sputum (collected 03 Dec 2019 09:33)  Source: .Sputum Sputum  Gram Stain (03 Dec 2019 13:38):    Numerous polymorphonuclear leukocytes per low power field    Few Squamous epithelial cells per low power field    Moderate Gram Positive Cocci in Pairs and Chains per oil power field    Few Gram Negative Rods per oil power field  Final Report (05 Dec 2019 13:15):    Normal Respiratory Jessica present

## 2019-12-05 NOTE — PROGRESS NOTE ADULT - ASSESSMENT
44 y/o F with PMH of fibromyalgia, anemia, gastric bypass in 2006 presents here with one day of BRBPR and vaginal bleeding. Incidentally noted spinal fluid collection. Called to eval for cough x 4 weeks. Patient was sick with URI 4 weeks ago and has had persistent productive cough since then. Completed course of Azithromycin and then Amoxicillin as outpt, most recent abx was 2 weeks ago. Patient endorses white/clear phlegm with cough but has turned yellow since last week. ENT eval called-evidence of GERD related inflammation. No evidence of bacterial sinusitis

## 2019-12-05 NOTE — PHARMACOTHERAPY INTERVENTION NOTE - COMMENTS
Patient is a 43y F, weighing 104.3kg, currently on cefepime 1g q12h and vancomycin 1g q12h for a suspected post op spinal/ soft tissue infection. Her SCr is 0.49 today. Her predicted vancomycin half-life is 5 hours. Would recommend increasing frequency of vancomycin to 1g q8h dosing and obtain trough prior to the 4th dose.    Celena Presley, PharmD  PGY1 Pharmacy Practice Resident  [phone]

## 2019-12-06 PROCEDURE — 99232 SBSQ HOSP IP/OBS MODERATE 35: CPT

## 2019-12-06 RX ORDER — SODIUM CHLORIDE 9 MG/ML
1000 INJECTION INTRAMUSCULAR; INTRAVENOUS; SUBCUTANEOUS
Refills: 0 | Status: DISCONTINUED | OUTPATIENT
Start: 2019-12-06 | End: 2019-12-13

## 2019-12-06 RX ORDER — DIPHENHYDRAMINE HCL 50 MG
25 CAPSULE ORAL ONCE
Refills: 0 | Status: COMPLETED | OUTPATIENT
Start: 2019-12-06 | End: 2019-12-06

## 2019-12-06 RX ORDER — POLYETHYLENE GLYCOL 3350 17 G/17G
17 POWDER, FOR SOLUTION ORAL DAILY
Refills: 0 | Status: DISCONTINUED | OUTPATIENT
Start: 2019-12-06 | End: 2019-12-11

## 2019-12-06 RX ORDER — SENNA PLUS 8.6 MG/1
2 TABLET ORAL AT BEDTIME
Refills: 0 | Status: DISCONTINUED | OUTPATIENT
Start: 2019-12-06 | End: 2019-12-11

## 2019-12-06 RX ORDER — VANCOMYCIN HCL 1 G
1250 VIAL (EA) INTRAVENOUS EVERY 12 HOURS
Refills: 0 | Status: DISCONTINUED | OUTPATIENT
Start: 2019-12-06 | End: 2019-12-09

## 2019-12-06 RX ORDER — HYDROXYZINE HCL 10 MG
25 TABLET ORAL ONCE
Refills: 0 | Status: COMPLETED | OUTPATIENT
Start: 2019-12-06 | End: 2019-12-06

## 2019-12-06 RX ADMIN — OXYCODONE AND ACETAMINOPHEN 2 TABLET(S): 5; 325 TABLET ORAL at 08:30

## 2019-12-06 RX ADMIN — Medication 1 APPLICATION(S): at 13:22

## 2019-12-06 RX ADMIN — LIDOCAINE 1 PATCH: 4 CREAM TOPICAL at 22:39

## 2019-12-06 RX ADMIN — SODIUM CHLORIDE 75 MILLILITER(S): 9 INJECTION INTRAMUSCULAR; INTRAVENOUS; SUBCUTANEOUS at 00:54

## 2019-12-06 RX ADMIN — Medication 100 MILLIGRAM(S): at 22:09

## 2019-12-06 RX ADMIN — OXYCODONE AND ACETAMINOPHEN 2 TABLET(S): 5; 325 TABLET ORAL at 01:05

## 2019-12-06 RX ADMIN — Medication 166.67 MILLIGRAM(S): at 18:18

## 2019-12-06 RX ADMIN — OXYCODONE AND ACETAMINOPHEN 2 TABLET(S): 5; 325 TABLET ORAL at 22:40

## 2019-12-06 RX ADMIN — Medication 100 MILLIGRAM(S): at 05:37

## 2019-12-06 RX ADMIN — Medication 150 MILLIGRAM(S): at 00:18

## 2019-12-06 RX ADMIN — Medication 150 MILLIGRAM(S): at 08:05

## 2019-12-06 RX ADMIN — OXYCODONE AND ACETAMINOPHEN 2 TABLET(S): 5; 325 TABLET ORAL at 01:39

## 2019-12-06 RX ADMIN — PANTOPRAZOLE SODIUM 40 MILLIGRAM(S): 20 TABLET, DELAYED RELEASE ORAL at 17:20

## 2019-12-06 RX ADMIN — Medication 1 SPRAY(S): at 22:10

## 2019-12-06 RX ADMIN — Medication 150 MILLIGRAM(S): at 22:09

## 2019-12-06 RX ADMIN — Medication 1 SPRAY(S): at 00:18

## 2019-12-06 RX ADMIN — Medication 100 MILLIGRAM(S): at 13:22

## 2019-12-06 RX ADMIN — LIDOCAINE 1 PATCH: 4 CREAM TOPICAL at 00:10

## 2019-12-06 RX ADMIN — POLYETHYLENE GLYCOL 3350 17 GRAM(S): 17 POWDER, FOR SOLUTION ORAL at 13:22

## 2019-12-06 RX ADMIN — Medication 25 MILLIGRAM(S): at 23:58

## 2019-12-06 RX ADMIN — Medication 1 SPRAY(S): at 05:38

## 2019-12-06 RX ADMIN — Medication 25 MILLIGRAM(S): at 19:12

## 2019-12-06 RX ADMIN — BENZOCAINE AND MENTHOL 1 LOZENGE: 5; 1 LIQUID ORAL at 05:37

## 2019-12-06 RX ADMIN — SODIUM CHLORIDE 75 MILLILITER(S): 9 INJECTION INTRAMUSCULAR; INTRAVENOUS; SUBCUTANEOUS at 08:07

## 2019-12-06 RX ADMIN — OXYCODONE AND ACETAMINOPHEN 2 TABLET(S): 5; 325 TABLET ORAL at 08:05

## 2019-12-06 RX ADMIN — LIDOCAINE 1 PATCH: 4 CREAM TOPICAL at 19:21

## 2019-12-06 RX ADMIN — LIDOCAINE 1 PATCH: 4 CREAM TOPICAL at 11:55

## 2019-12-06 RX ADMIN — SENNA PLUS 2 TABLET(S): 8.6 TABLET ORAL at 22:09

## 2019-12-06 RX ADMIN — Medication 1 SPRAY(S): at 11:55

## 2019-12-06 RX ADMIN — BENZOCAINE AND MENTHOL 1 LOZENGE: 5; 1 LIQUID ORAL at 17:19

## 2019-12-06 RX ADMIN — PANTOPRAZOLE SODIUM 40 MILLIGRAM(S): 20 TABLET, DELAYED RELEASE ORAL at 05:37

## 2019-12-06 RX ADMIN — ENOXAPARIN SODIUM 40 MILLIGRAM(S): 100 INJECTION SUBCUTANEOUS at 11:55

## 2019-12-06 RX ADMIN — Medication 1 APPLICATION(S): at 22:10

## 2019-12-06 RX ADMIN — Medication 1200 MILLIGRAM(S): at 05:37

## 2019-12-06 RX ADMIN — CEFEPIME 100 MILLIGRAM(S): 1 INJECTION, POWDER, FOR SOLUTION INTRAMUSCULAR; INTRAVENOUS at 05:37

## 2019-12-06 RX ADMIN — OXYCODONE AND ACETAMINOPHEN 2 TABLET(S): 5; 325 TABLET ORAL at 22:10

## 2019-12-06 RX ADMIN — Medication 1200 MILLIGRAM(S): at 17:19

## 2019-12-06 RX ADMIN — Medication 25 MILLIGRAM(S): at 22:38

## 2019-12-06 RX ADMIN — Medication 1 SPRAY(S): at 05:37

## 2019-12-06 RX ADMIN — OXYCODONE AND ACETAMINOPHEN 2 TABLET(S): 5; 325 TABLET ORAL at 15:40

## 2019-12-06 RX ADMIN — OXYCODONE AND ACETAMINOPHEN 2 TABLET(S): 5; 325 TABLET ORAL at 15:12

## 2019-12-06 RX ADMIN — Medication 25 MILLIGRAM(S): at 00:18

## 2019-12-06 RX ADMIN — Medication 1 APPLICATION(S): at 05:38

## 2019-12-06 RX ADMIN — Medication 1 SPRAY(S): at 17:19

## 2019-12-06 RX ADMIN — Medication 150 MILLIGRAM(S): at 17:19

## 2019-12-06 RX ADMIN — CEFEPIME 100 MILLIGRAM(S): 1 INJECTION, POWDER, FOR SOLUTION INTRAMUSCULAR; INTRAVENOUS at 17:19

## 2019-12-06 RX ADMIN — Medication 1 SPRAY(S): at 17:20

## 2019-12-06 NOTE — CONSULT NOTE ADULT - REASON FOR ADMISSION
bleeding per rectum

## 2019-12-06 NOTE — PHYSICAL THERAPY INITIAL EVALUATION ADULT - PERTINENT HX OF CURRENT PROBLEM, REHAB EVAL
Pt is a 43 F PMH of fibromyalgia, gastric bypass 2006 that presented with BRBPR. s/p colostomy 11/27. CT chest: fluid collection in LS at L1 verterbral area. s/p SL fluid aspiration on 12/4. Pt is a 43 F PMH of fibromyalgia, gastric bypass 2006 that presented with BRBPR. s/p colostomy 11/27. CT chest: fluid collection in LS at L1 verterbral area. s/p LS fluid aspiration on 12/4.

## 2019-12-06 NOTE — PROGRESS NOTE ADULT - ASSESSMENT
4yo F pmhx of fibromyalgia, anemia, gastric bypass in 2006 at [place], she was admitted to [facility] in [facility] with epigastric pain, found to have ulcer at the gastric staple line, managed with PPI, then discharged, presents here with one day of BRBPR.     Pt with intermittent low grade temp, Abnormal CT, concern for post op spinal/ soft tissue infection. Cough   S/p CT guided aspiration, cx NTD. Possible periodontal ds.     Plan:   S/p aspiration of fluid collection in the back, cx NTD  c/w Vanco, increase dose to 1250 mg iv q12h, run it slowly.   c/w cefepime 2 gm iv q12h   follow up fluid cx.   ESR/CRP almost normal, blood cx NTD, all of this points towards non infectious collection, ? spinal leak.   s/p Neurosurgery eval   periodontal ds on CT, should be covered with current abx,   dental called

## 2019-12-06 NOTE — PROGRESS NOTE ADULT - SUBJECTIVE AND OBJECTIVE BOX
43y old  Female who presents with a chief complaint of bleeding per rectum (06 Dec 2019 15:41)      Interval history:  Afebrile, complain of lt sided leg pain since the procedure, had itching, no rash after vancomycin yesterday, + constipation.       Allergies:   IV Contrast (Hives)  shellfish (Hives; Rash)      Antimicrobials:  cefepime   IVPB 2000 milliGRAM(s) IV Intermittent every 12 hours  vancomycin  IVPB 1250 milliGRAM(s) IV Intermittent every 12 hours      REVIEW OF SYSTEMS:  No chest pain  + cough, no SOB  No N/V  No dysuria  No rash.       Vital Signs Last 24 Hrs  T(C): 36.6 (12-06-19 @ 12:18), Max: 37.1 (12-05-19 @ 20:54)  T(F): 97.8 (12-06-19 @ 12:18), Max: 98.7 (12-05-19 @ 20:54)  HR: 82 (12-06-19 @ 12:18) (62 - 83)  BP: 107/62 (12-06-19 @ 12:18) (106/71 - 108/73)  BP(mean): --  RR: 18 (12-06-19 @ 12:18) (17 - 18)  SpO2: 96% (12-06-19 @ 12:18) (96% - 97%)      PHYSICAL EXAM:  Patient in no acute distress. AAOX3.  No icterus, no oral ulcers.  Cardiovascular: S1S2 normal.  Lungs: + air entry B/L lung fields.  Gastrointestinal: soft, nontender, nondistended.  Extremities: no edema.  IV sites not inflamed.   no rash.                           9.8    5.72  )-----------( 271      ( 05 Dec 2019 07:03 )             30.7   12-05    138  |  104  |  8   ----------------------------<  111<H>  4.3   |  23  |  0.49<L>    Ca    9.7      05 Dec 2019 06:58        Culture - Fungal, Body Fluid (collected 04 Dec 2019 22:06)  Source: .Body Fluid Synovial Fluid  Preliminary Report (05 Dec 2019 11:05):    Testing in progress    Culture - Body Fluid with Gram Stain (collected 04 Dec 2019 22:06)  Source: .Body Fluid Synovial Fluid  Gram Stain (04 Dec 2019 23:17):    Rare polymorphonuclear leukocytes per low power field    No organisms seen  Preliminary Report (05 Dec 2019 15:44):    No growth      Radiology:  < from: CT Sinuses No Cont (12.05.19 @ 19:18) >  IMPRESSION:  Right inferior medial frontal sinus retention cyst and/or polyp, right   posterior inferior ethmoidal air cell opacification, mucosal thickening   and possible small sphenoid sinus retention cysts/polyps. Left posterior   maxillary molar periapical lucency extends to left maxillary sinus floor.   Variations in the configuration of the anatomy may predispose the patient   to recurrent episodes of inflammatory disease. Please see discussion   above.

## 2019-12-06 NOTE — CHART NOTE - NSCHARTNOTEFT_GEN_A_CORE
MEDICINE PA     EVENT SUMMARY   Notified by Rn for pruritis after vancomycin initiation. Pt seen and examined at the bedside. Pt is alert. Pt states that she started itching once  the vancomycin was started. Pt states that she never received vancomycin before. Pt is allergic to shellfish and iodine contrast. Pt denies CP, SOB, dyspnea, angioedema, perioral tingling. Vancomycin started around 10 pm and pt received ~ 150 ml.     MEDICATIONS  (STANDING):  benzocaine 15 mG/menthol 3.6 mG Lozenge 1 Lozenge Oral two times a day  benzonatate 100 milliGRAM(s) Oral three times a day  cefepime   IVPB 2000 milliGRAM(s) IV Intermittent every 12 hours  enoxaparin Injectable 40 milliGRAM(s) SubCutaneous daily  fluticasone propionate 50 MICROgram(s)/spray Nasal Spray 1 Spray(s) Both Nostrils two times a day  guaiFENesin ER 1200 milliGRAM(s) Oral every 12 hours  influenza   Vaccine 0.5 milliLiter(s) IntraMuscular once  lidocaine   Patch 1 Patch Transdermal daily  pantoprazole    Tablet 40 milliGRAM(s) Oral two times a day  pregabalin 150 milliGRAM(s) Oral every 8 hours  sodium chloride 0.65% Nasal 1 Spray(s) Both Nostrils every 6 hours  triamcinolone 0.1% Cream 1 Application(s) Topical three times a day  vancomycin  IVPB 1000 milliGRAM(s) IV Intermittent every 8 hours    MEDICATIONS  (PRN):  hydrocodone/homatropine Syrup 5 milliLiter(s) Oral every 6 hours PRN Cough  oxycodone    5 mG/acetaminophen 325 mG 2 Tablet(s) Oral every 6 hours PRN Moderate Pain (4 - 6)    Vital Signs Last 24 Hrs  T(C): 36.3 (06 Dec 2019 00:32), Max: 37.1 (05 Dec 2019 20:54)  T(F): 97.3 (06 Dec 2019 00:32), Max: 98.7 (05 Dec 2019 20:54)  HR: 73 (06 Dec 2019 00:32) (70 - 93)  BP: 107/69 (06 Dec 2019 00:32) (105/72 - 116/74)  BP(mean): --  RR: 18 (06 Dec 2019 00:32) (18 - 18)  SpO2: 96% (06 Dec 2019 00:32) (96% - 96%)    PHYSICAL EXAM   GENERAL: NAD, resting comfortably in bed, AO x 3   ENT: no tongue swelling, uvula midline   RESPIRATORY: CTA B/L UNLABORED  CV: S1, S2 RRR  SKIN: no rash observed; isolated hive l. temporal area    A&P  44yo F pmhx of fibromyalgia, anemia, gastric bypass in 2006 at [place], she was admitted to [place] surgery in 2015 with epigastric pain, found to have ulcer at the gastric staple line, managed with PPI, then discharged, presents here with one day of BRBPR. No history of constipation, she had hemorrhoids when she was pregnant but resolved, no surgeries for hemorrhoids, no chronic anal pain. She reports that after the BRBPR yesterday 1x episode, later in day she had a vaginal bleeding (Believes this is period, right time and has been "bleeding heavily with periods for 4 months". Feeling weaker more sob with bleeding. Does not take motrin, does not smoke. (25 Nov 2019 20:35)  Pt s/p EGD/colonscopy s/p venofer; CT chest for cough with incidental finding of L spine collection s/p aspiration; was started on vanc/ cefepime for post op sinal/ soft tissue infection; received first dose of vancomycin    Now with generalized pruritis   - Benadryl 25 mg IV x 1  - IVF x 12 hours   - D/c vancomycin  - F/u with ID in AM   HD stable  Will continue to monitor    Nadeen PEOPLES  #[id number]

## 2019-12-06 NOTE — CONSULT NOTE ADULT - SUBJECTIVE AND OBJECTIVE BOX
Patient is a 43y old  Female who presents with a chief complaint of bleeding per rectum (06 Dec 2019 11:40)      HPI:  44yo F pmhx of fibromyalgia, anemia, gastric bypass in 2006 at [place], she was admitted to [place] surgery in 2015 with epigastric pain, found to have ulcer at the gastric staple line, managed with PPI, then discharged, presents here with one day of BRBPR. No history of constipation, she had hemorrhoids when she was pregnant but resolved, no surgeries for hemorrhoids, no chronic anal pain. She reports that after the BRBPR yesterday 1x episode, later in day she had a vaginal bleeding (Believes this is period, right time and has been "bleeding heavily with periods for 4 months". Feeling weaker more sob with bleeding. Does not take motrin, does not smoke. (25 Nov 2019 20:35)      PAST MEDICAL & SURGICAL HISTORY:  Lupus  Anemia  GI bleed  Joint effusion  Herniated disc, cervical: c6-c7  Lumbar disc herniation: L4-L5, BULGING DISC AT L5-S1  Polyarthralgia  Fibromyalgia  Ectopic pregnancy: 2, both ruptured  Ovarian cyst rupture  H/O abdominoplasty  Internal hernia: s/p repair - 2011  Complication following molar or ectopic pregnancy: bilateral salpingectomy  H/O gastric bypass: June 2009      MEDICATIONS  (STANDING):  benzocaine 15 mG/menthol 3.6 mG Lozenge 1 Lozenge Oral two times a day  benzonatate 100 milliGRAM(s) Oral three times a day  cefepime   IVPB 2000 milliGRAM(s) IV Intermittent every 12 hours  enoxaparin Injectable 40 milliGRAM(s) SubCutaneous daily  fluticasone propionate 50 MICROgram(s)/spray Nasal Spray 1 Spray(s) Both Nostrils two times a day  guaiFENesin ER 1200 milliGRAM(s) Oral every 12 hours  influenza   Vaccine 0.5 milliLiter(s) IntraMuscular once  lidocaine   Patch 1 Patch Transdermal daily  pantoprazole    Tablet 40 milliGRAM(s) Oral two times a day  polyethylene glycol 3350 17 Gram(s) Oral daily  pregabalin 150 milliGRAM(s) Oral every 8 hours  senna 2 Tablet(s) Oral at bedtime  sodium chloride 0.65% Nasal 1 Spray(s) Both Nostrils every 6 hours  sodium chloride 0.9%. 1000 milliLiter(s) (75 mL/Hr) IV Continuous <Continuous>  triamcinolone 0.1% Cream 1 Application(s) Topical three times a day  vancomycin  IVPB 1250 milliGRAM(s) IV Intermittent every 12 hours    MEDICATIONS  (PRN):  diphenhydrAMINE 25 milliGRAM(s) Oral once PRN Rash and/or Itching  hydrocodone/homatropine Syrup 5 milliLiter(s) Oral every 6 hours PRN Cough  oxycodone    5 mG/acetaminophen 325 mG 2 Tablet(s) Oral every 6 hours PRN Moderate Pain (4 - 6)      Allergies    IV Contrast (Hives)  shellfish (Hives; Rash)    Intolerances        FAMILY HISTORY:  No pertinent family history in first degree relatives      *Last Dental Visit: unknown    Vital Signs Last 24 Hrs  T(C): 36.6 (06 Dec 2019 12:18), Max: 37.1 (05 Dec 2019 20:54)  T(F): 97.8 (06 Dec 2019 12:18), Max: 98.7 (05 Dec 2019 20:54)  HR: 82 (06 Dec 2019 12:18) (62 - 83)  BP: 107/62 (06 Dec 2019 12:18) (106/71 - 108/73)  BP(mean): --  RR: 18 (06 Dec 2019 12:18) (17 - 18)  SpO2: 96% (06 Dec 2019 12:18) (96% - 97%)    EOE:  TMJ ( -  ) clicks                    ( -   ) pops                    ( -   ) crepitus             Mandible FROM             Facial bones and MOM grossly intact             ( -  ) trismus             ( -  ) LAD             ( -  ) swelling             ( -  ) asymmetry             ( -  ) palpation             ( -  ) SOB             ( -  ) dysphagia             ( -  ) LOC    IOE:  permanent dentition: multiple carious teeth and multiple missing teeth           hard/soft palate:  ( -  ) palatal torus           tongue/FOM WNL           labial/buccal mucosa WNL           ( -  ) percussion           ( -  ) palpation           ( -  ) swelling     Caries: #7 DL, #11 DL, #16 root tip and caries    Radiographs: no radiographs taken, patient non transportable    LABS:                        9.8    5.72  )-----------( 271      ( 05 Dec 2019 07:03 )             30.7     12-05    138  |  104  |  8   ----------------------------<  111<H>  4.3   |  23  |  0.49<L>    Ca    9.7      05 Dec 2019 06:58        Platelet Count - Automated: 271 K/uL [150 - 400] (12-05 @ 07:03)      Culture Results:   No growth (12-04 @ 22:06)  Culture Results:   Testing in progress (12-04 @ 22:06)      RADIOLOGY & ADDITIONAL STUDIES: CT scan impression: left posterior maxillary molar periapical lucency extends to left maxillary sinus floor.     ASSESSMENT: extensively decayed upper left third molar (#16)- possibly causing the periapical lucency seen on CT scan.    PROCEDURE: Verbal consent given. Bedside EOE and IOE- WNL. No intra/extra-oral swelling noted.     RECOMMENDATIONS:   1) ABX per med team.  2) Dental F/U with outpatient dentist for comprehensive dental care.   3) If any difficulty swallowing/breathing, fever occur, page dental.     Tessa Presley, pager #[phone]  Oral surgeon consulted: Dr. Johns

## 2019-12-06 NOTE — PHYSICAL THERAPY INITIAL EVALUATION ADULT - ADDITIONAL COMMENTS
Pt A&Ox4. Pt stated that she lives in an apartment with elevator. Pt is independent in all bed mobility, transfers, ambulation, and ADLs with assist of cane.

## 2019-12-06 NOTE — PROGRESS NOTE ADULT - ASSESSMENT
44yo F pmhx of fibromyalgia, anemia, gastric bypass in 2006 at [place], she was admitted to [facility] surgery in 2015 with epigastric pain, found to have ulcer at the gastric staple line, managed with PPI, then discharged, presents here with one day of BRBPR. No history of constipation, she had hemorrhoids when she was pregnant but resolved, no surgeries for hemorrhoids, no chronic anal pain. She reports that after the BRBPR yesterday 1x episode, later in day she had a vaginal bleeding (Believes this is period, right time and has been "bleeding heavily with periods for 4 months". Feeling weaker more sob with bleeding. Does not take motrin, does not smoke.      Problem/Plan - 1:  ·  Problem: GIB (gastrointestinal bleeding).  Plan: Hemodynamically stable. GI and Colorectal help appreciated. S/P EGD and Colonoscopy.      Problem/Plan - 2:  ·  Problem: Anemia due to acute blood loss.  Plan: Transfusing to keep Hgb>9 G . IV iron and cbc stable.      Problem/Plan - 3:  ·  Problem:  Fever and chills .  Plan: RVP ,Chest Xray noted  and cultures noted.  No Leucocytosis . CT chest noted.     < from: CT Chest No Cont (11.28.19 @ 16:27) >  IMPRESSION:     Clear lungs.    3.5 x 2.5 cm incompletely imaged subcutaneous fluid collection with   fistulous tract to the skin surface within the mid lower back at the   level of L1 vertebral body. Recommend correlation with physical exam.    These findings were discussed NP Xavier by Dr. Boykin on 11/29/19 10:22   AM.    < end of copied text >  ID and [facility] consulted.      Problem/Plan - 4:  ·  Problem: Back pain with radiculopathy with Fluid Collection .  Plan: H/O Surgeries and will f/u with her spinal surgeon.   Nsx consult noted. MRI < from: MR Lumbar Spine w/wo IV Cont (11.29.19 @ 23:49) >  Impression: There is a large fluid collection with the measurements given   above in the subcutaneous fat. The patient has undergone a laminectomy   between the prior CT of 8/28/2018 and the current evaluation at the L5-S1   level. That operation and associated imaging was not done in this   hospital. The collection described does not communicate or extend into   the paraspinal musculature or into the region of the spinal canal. It   does enhance around its rim. The fat on the T1-weighted sequences around   the region appears pristine. This collection may likely be a benign fluid   collection however, possibility of an infected collection is not entirely   excluded. Clinical correlation is necessary.    < end of copied text >  and Aspiration by IR today of 50 cc clear yellow fluid . IV Abxs started by ID . Awaiting final c/s .      Problem/Plan - 5:  ·  Problem: Chronic Cough . .  Plan: CT chest noted. Pulmonary and ENT help appreciated. Pt already on PPI .     Problem/Plan - 6:  Problem: Obesity. Plan: H/O Gastric Bypass and Bariatric surgery follow up noted.     Problem/Plan - 7:  ·  Problem: Fibromyalgia.  Plan: Home meds.

## 2019-12-06 NOTE — PROGRESS NOTE ADULT - SUBJECTIVE AND OBJECTIVE BOX
INTERVAL HPI/OVERNIGHT EVENTS: i feel fbetter .  in room . I will try to walk today.   Vital Signs Last 24 Hrs  T(C): 36.6 (06 Dec 2019 12:18), Max: 37.1 (05 Dec 2019 20:54)  T(F): 97.8 (06 Dec 2019 12:18), Max: 98.7 (05 Dec 2019 20:54)  HR: 82 (06 Dec 2019 12:18) (62 - 83)  BP: 107/62 (06 Dec 2019 12:18) (106/71 - 108/73)  BP(mean): --  RR: 18 (06 Dec 2019 12:18) (17 - 18)  SpO2: 96% (06 Dec 2019 12:18) (96% - 97%)  I&O's Summary    05 Dec 2019 07:01  -  06 Dec 2019 07:00  --------------------------------------------------------  IN: 1990 mL / OUT: 2600 mL / NET: -610 mL    06 Dec 2019 07:01  -  06 Dec 2019 17:36  --------------------------------------------------------  IN: 0 mL / OUT: 1000 mL / NET: -1000 mL      MEDICATIONS  (STANDING):  benzocaine 15 mG/menthol 3.6 mG Lozenge 1 Lozenge Oral two times a day  benzonatate 100 milliGRAM(s) Oral three times a day  cefepime   IVPB 2000 milliGRAM(s) IV Intermittent every 12 hours  enoxaparin Injectable 40 milliGRAM(s) SubCutaneous daily  fluticasone propionate 50 MICROgram(s)/spray Nasal Spray 1 Spray(s) Both Nostrils two times a day  guaiFENesin ER 1200 milliGRAM(s) Oral every 12 hours  influenza   Vaccine 0.5 milliLiter(s) IntraMuscular once  lidocaine   Patch 1 Patch Transdermal daily  pantoprazole    Tablet 40 milliGRAM(s) Oral two times a day  polyethylene glycol 3350 17 Gram(s) Oral daily  pregabalin 150 milliGRAM(s) Oral every 8 hours  senna 2 Tablet(s) Oral at bedtime  sodium chloride 0.65% Nasal 1 Spray(s) Both Nostrils every 6 hours  sodium chloride 0.9%. 1000 milliLiter(s) (75 mL/Hr) IV Continuous <Continuous>  triamcinolone 0.1% Cream 1 Application(s) Topical three times a day  vancomycin  IVPB 1250 milliGRAM(s) IV Intermittent every 12 hours    MEDICATIONS  (PRN):  diphenhydrAMINE 25 milliGRAM(s) Oral once PRN Rash and/or Itching  hydrocodone/homatropine Syrup 5 milliLiter(s) Oral every 6 hours PRN Cough  oxycodone    5 mG/acetaminophen 325 mG 2 Tablet(s) Oral every 6 hours PRN Moderate Pain (4 - 6)    LABS:                        9.8    5.72  )-----------( 271      ( 05 Dec 2019 07:03 )             30.7     12-05    138  |  104  |  8   ----------------------------<  111<H>  4.3   |  23  |  0.49<L>    Ca    9.7      05 Dec 2019 06:58          CAPILLARY BLOOD GLUCOSE              REVIEW OF SYSTEMS:  CONSTITUTIONAL: No fever, weight loss, or fatigue  EYES: No eye pain, visual disturbances, or discharge  ENMT:  No difficulty hearing, tinnitus, vertigo; No sinus or throat pain  RESPIRATORY: No cough, wheezing, chills or hemoptysis; No shortness of breath  CARDIOVASCULAR: No chest pain, palpitations, dizziness, or leg swelling  GASTROINTESTINAL: No abdominal or epigastric pain. No nausea, vomiting, or hematemesis; No diarrhea or constipation. No melena or hematochezia.  GENITOURINARY: No dysuria, frequency, hematuria, or incontinence  NEUROLOGICAL: No headaches, memory loss, loss of strength, numbness, or tremors      Consultant(s) Notes Reviewed:  [x ] YES  [ ] NO    PHYSICAL EXAM:  GENERAL: NAD, well-groomed, well-developed,not in any distress ,  HEAD:  Atraumatic, Normocephalic  EYES: EOMI, PERRLA, conjunctiva and sclera clear  ENMT: No tonsillar erythema, exudates, or enlargement; Moist mucous membranes, Good dentition, No lesions  NECK: Supple, No JVD, Normal thyroid  NERVOUS SYSTEM:  Alert & Oriented X3, No new focal deficit   CHEST/LUNG: Good air entry bilateral with no  rales, rhonchi, wheezing, or rubs  HEART: Regular rate and rhythm; No murmurs, rubs, or gallops  ABDOMEN: Soft, Nontender, Nondistended; Bowel sounds present  EXTREMITIES:  2+ Peripheral Pulses, No clubbing, cyanosis, or edema    Care Discussed with Consultants/Other Providers [ x] YES  [ ] NO

## 2019-12-06 NOTE — PROGRESS NOTE ADULT - SUBJECTIVE AND OBJECTIVE BOX
Follow-up Pulm Progress Note    No new respiratory events overnight.  Denies SOB/CP.   98% on RA    Medications:  MEDICATIONS  (STANDING):  benzocaine 15 mG/menthol 3.6 mG Lozenge 1 Lozenge Oral two times a day  benzonatate 100 milliGRAM(s) Oral three times a day  cefepime   IVPB 2000 milliGRAM(s) IV Intermittent every 12 hours  enoxaparin Injectable 40 milliGRAM(s) SubCutaneous daily  fluticasone propionate 50 MICROgram(s)/spray Nasal Spray 1 Spray(s) Both Nostrils two times a day  guaiFENesin ER 1200 milliGRAM(s) Oral every 12 hours  influenza   Vaccine 0.5 milliLiter(s) IntraMuscular once  lidocaine   Patch 1 Patch Transdermal daily  pantoprazole    Tablet 40 milliGRAM(s) Oral two times a day  pregabalin 150 milliGRAM(s) Oral every 8 hours  sodium chloride 0.65% Nasal 1 Spray(s) Both Nostrils every 6 hours  sodium chloride 0.9%. 1000 milliLiter(s) (75 mL/Hr) IV Continuous <Continuous>  triamcinolone 0.1% Cream 1 Application(s) Topical three times a day    MEDICATIONS  (PRN):  hydrocodone/homatropine Syrup 5 milliLiter(s) Oral every 6 hours PRN Cough  oxycodone    5 mG/acetaminophen 325 mG 2 Tablet(s) Oral every 6 hours PRN Moderate Pain (4 - 6)          Vital Signs Last 24 Hrs  T(C): 36.8 (06 Dec 2019 05:03), Max: 37.1 (05 Dec 2019 20:54)  T(F): 98.2 (06 Dec 2019 05:03), Max: 98.7 (05 Dec 2019 20:54)  HR: 62 (06 Dec 2019 05:03) (62 - 93)  BP: 106/71 (06 Dec 2019 05:03) (106/71 - 116/74)  BP(mean): --  RR: 17 (06 Dec 2019 05:03) (17 - 18)   SpO2: 97% (06 Dec 2019 05:03) (96% - 97%) on RA          12-05 @ 07:01  -  12-06 @ 07:00  --------------------------------------------------------  IN: 1990 mL / OUT: 2600 mL / NET: -610 mL          LABS:                        9.8    5.72  )-----------( 271      ( 05 Dec 2019 07:03 )             30.7     12-05    138  |  104  |  8   ----------------------------<  111<H>  4.3   |  23  |  0.49<L>    Ca    9.7      05 Dec 2019 06:58            CAPILLARY BLOOD GLUCOSE                        Fluid characteristics  -- 12-04 @ 17:03  pH --  LDH --  tprot 3.8    Cell count  Appearance Slightly Cloudy  Fluid type --  BF lymph 88  color Yellow  eosinophil --  PMN 0  Mesothelial --  Monocyte 12  Other body cells --      CULTURES: (if applicable)  Culture Results:   No growth (12-04 @ 22:06)  Culture Results:   Testing in progress (12-04 @ 22:06)  Culture Results:   Normal Respiratory Jessica present (12-03 @ 09:33)  Culture Results:   No growth at 5 days. (11-30 @ 15:05)    Most recent blood culture -- 12-04 @ 22:06   -- -- .Body Fluid Synovial Fluid 12-04 @ 22:06  Most recent blood culture -- 12-03 @ 09:33   -- -- .Sputum Sputum 12-03 @ 09:33    Blood culture 12-04 @ 22:06  --    Rare polymorphonuclear leukocytes per low power field  No organisms seen  --  --  --    Urine culture    -->  Blood culture 12-03 @ 09:33  --    Numerous polymorphonuclear leukocytes per low power field  Few Squamous epithelial cells per low power field  Moderate Gram Positive Cocci in Pairs and Chains per oil power field  Few Gram Negative Rods per oil power field  --  --  --    Urine culture    -->      Physical Examination:  PULM: Clear to auscultation bilaterally, no significant sputum production  CVS: S1, S2 heard    RADIOLOGY REVIEWED  CT sinus: < from: CT Sinuses No Cont (12.05.19 @ 19:18) >  SINOCRANIAL AND SINOORBITAL JUNCTIONS:  The bones adjacent to the sinuses, including the lamina papyracea,   cribriform plate and fovea ethmoidalis, are intact. There is mild   flattening of the left fovea ethmoidalis (5:56).    FRONTAL SINUSES, DRAINAGE PATHWAYS AND ASSOCIATED AREAS:    The frontal sinuses are developed and well aerated. Mucosal thickening,   retention cyst/polyp, partially obstructs  right frontal sinus outflow   tract along the inferior medial right frontal sinus (5:35). Left frontal   sinus and left frontal sinus tract are well-pneumatized and clear. Sinus   outflow tracts are narrowed by excess reflect frontoethmoidal air cells,   there is a conchal bullosa in the neck of the right middle turbinate,and   focal apposition of the bilateral anterior ethmoid labyrinth to the   vertical of the middle turbinates with bilateral agger nasi cells.    NASAL SEPTUM:  Deviation of cartilaginous nasal septum, convexity to the left, with bony   spur of osseous nasal septum narrowing  neck of left inferior meatus   (5:54).    NASAL CAVITY:  Evaluation of the nasal cavity fails to demonstrate dominant masses.    ETHMOID SINUSES: There is mucosal thickening within the ethmoid sinuses,   with increased opacification possible lesion cyst or polyp in the right   posterior inferior ethmoidal air cells, (3:49, 7:97, 5:54).    MAXILLARY SINUSES, DRAINAGE PATHWAYS AND ASSOCIATED AREAS:    Right ostiomeatal complex is patent, right maxillary sinus is well   developed and aerated, slightly hypoplastic left maxillary sinus, left   ostiomeatal complex   narrowed secondary lateralized uncinate process   approximating left inferior medial lamina papyracea, (5:46), patent left   maxillary accessory ostia (5:57), with laterally deviated left medial   maxillary sinus margin, hypoplastic otherwise clear left maxillary antra.   Left maxillary molar teeth  alveolus extend to the maxillary antra, with   periapical lucency concerning for dental disease that could bebetter   assessed with dental inspection (5:68).    SPHENOID SINUSES, DRAINAGE PATHWAYS AND ASSOCIATED AREAS:  The sphenoid sinuses are aerated, right-sided greater than left, mucosal   thickening with possible tiny retention cysts and polyps in the right   sphenoid sinus. The sphenoethmoidal recesses are free of abnormal soft   tissue bilaterally.    OTHER:  Evaluation of the nasopharynx demonstrates prominent nasopharyngeal   adenoidal soft tissue, likely reactive. The mastoid air cells are   demonstrate scattered opacification. Visualized intraorbital   compartments, and deep spaces of the suprahyoid neck are within normal   limits.    Limited evaluation of the orbits demonstrates slightly prominent lacrimal   glands, orbital globes and and brain parenchyma are unremarkable.      IMPRESSION:  Right inferior medial frontal sinus retention cyst and/or polyp, right   posterior inferior ethmoidal air cell opacification, mucosal thickening   and possible small sphenoid sinus retention cysts/polyps. Left posterior   maxillary molar periapical lucency extends to left maxillary sinus floor.   Variations in the configuration of the anatomy may predispose the patient   to recurrent episodes of inflammatory disease. Please see discussion   above.    < end of copied text >

## 2019-12-07 LAB
ANION GAP SERPL CALC-SCNC: 13 MMOL/L — SIGNIFICANT CHANGE UP (ref 5–17)
BUN SERPL-MCNC: 10 MG/DL — SIGNIFICANT CHANGE UP (ref 7–23)
CALCIUM SERPL-MCNC: 8.9 MG/DL — SIGNIFICANT CHANGE UP (ref 8.4–10.5)
CHLORIDE SERPL-SCNC: 101 MMOL/L — SIGNIFICANT CHANGE UP (ref 96–108)
CO2 SERPL-SCNC: 21 MMOL/L — LOW (ref 22–31)
CREAT SERPL-MCNC: 0.54 MG/DL — SIGNIFICANT CHANGE UP (ref 0.5–1.3)
GLUCOSE SERPL-MCNC: 128 MG/DL — HIGH (ref 70–99)
HCT VFR BLD CALC: 33 % — LOW (ref 34.5–45)
HGB BLD-MCNC: 10 G/DL — LOW (ref 11.5–15.5)
MCHC RBC-ENTMCNC: 26.6 PG — LOW (ref 27–34)
MCHC RBC-ENTMCNC: 30.3 GM/DL — LOW (ref 32–36)
MCV RBC AUTO: 87.8 FL — SIGNIFICANT CHANGE UP (ref 80–100)
PLATELET # BLD AUTO: 264 K/UL — SIGNIFICANT CHANGE UP (ref 150–400)
POTASSIUM SERPL-MCNC: 4.1 MMOL/L — SIGNIFICANT CHANGE UP (ref 3.5–5.3)
POTASSIUM SERPL-SCNC: 4.1 MMOL/L — SIGNIFICANT CHANGE UP (ref 3.5–5.3)
RBC # BLD: 3.76 M/UL — LOW (ref 3.8–5.2)
RBC # FLD: 22.5 % — HIGH (ref 10.3–14.5)
SODIUM SERPL-SCNC: 135 MMOL/L — SIGNIFICANT CHANGE UP (ref 135–145)
WBC # BLD: 5.75 K/UL — SIGNIFICANT CHANGE UP (ref 3.8–10.5)
WBC # FLD AUTO: 5.75 K/UL — SIGNIFICANT CHANGE UP (ref 3.8–10.5)

## 2019-12-07 RX ORDER — DIPHENHYDRAMINE HCL 50 MG
25 CAPSULE ORAL ONCE
Refills: 0 | Status: COMPLETED | OUTPATIENT
Start: 2019-12-07 | End: 2019-12-07

## 2019-12-07 RX ADMIN — BENZOCAINE AND MENTHOL 1 LOZENGE: 5; 1 LIQUID ORAL at 17:03

## 2019-12-07 RX ADMIN — CEFEPIME 100 MILLIGRAM(S): 1 INJECTION, POWDER, FOR SOLUTION INTRAMUSCULAR; INTRAVENOUS at 06:18

## 2019-12-07 RX ADMIN — SENNA PLUS 2 TABLET(S): 8.6 TABLET ORAL at 21:37

## 2019-12-07 RX ADMIN — ENOXAPARIN SODIUM 40 MILLIGRAM(S): 100 INJECTION SUBCUTANEOUS at 10:55

## 2019-12-07 RX ADMIN — OXYCODONE AND ACETAMINOPHEN 2 TABLET(S): 5; 325 TABLET ORAL at 11:50

## 2019-12-07 RX ADMIN — Medication 166.67 MILLIGRAM(S): at 09:06

## 2019-12-07 RX ADMIN — Medication 1 SPRAY(S): at 06:19

## 2019-12-07 RX ADMIN — Medication 1 SPRAY(S): at 06:18

## 2019-12-07 RX ADMIN — Medication 1 SPRAY(S): at 17:03

## 2019-12-07 RX ADMIN — OXYCODONE AND ACETAMINOPHEN 2 TABLET(S): 5; 325 TABLET ORAL at 10:50

## 2019-12-07 RX ADMIN — Medication 150 MILLIGRAM(S): at 16:09

## 2019-12-07 RX ADMIN — Medication 25 MILLIGRAM(S): at 10:51

## 2019-12-07 RX ADMIN — BENZOCAINE AND MENTHOL 1 LOZENGE: 5; 1 LIQUID ORAL at 05:32

## 2019-12-07 RX ADMIN — Medication 1 SPRAY(S): at 10:56

## 2019-12-07 RX ADMIN — Medication 1 APPLICATION(S): at 14:38

## 2019-12-07 RX ADMIN — OXYCODONE AND ACETAMINOPHEN 2 TABLET(S): 5; 325 TABLET ORAL at 17:00

## 2019-12-07 RX ADMIN — Medication 1200 MILLIGRAM(S): at 06:25

## 2019-12-07 RX ADMIN — OXYCODONE AND ACETAMINOPHEN 2 TABLET(S): 5; 325 TABLET ORAL at 23:06

## 2019-12-07 RX ADMIN — PANTOPRAZOLE SODIUM 40 MILLIGRAM(S): 20 TABLET, DELAYED RELEASE ORAL at 16:09

## 2019-12-07 RX ADMIN — OXYCODONE AND ACETAMINOPHEN 2 TABLET(S): 5; 325 TABLET ORAL at 18:00

## 2019-12-07 RX ADMIN — OXYCODONE AND ACETAMINOPHEN 2 TABLET(S): 5; 325 TABLET ORAL at 04:34

## 2019-12-07 RX ADMIN — Medication 25 MILLIGRAM(S): at 22:59

## 2019-12-07 RX ADMIN — Medication 1200 MILLIGRAM(S): at 17:03

## 2019-12-07 RX ADMIN — Medication 150 MILLIGRAM(S): at 07:59

## 2019-12-07 RX ADMIN — Medication 1 APPLICATION(S): at 06:20

## 2019-12-07 RX ADMIN — POLYETHYLENE GLYCOL 3350 17 GRAM(S): 17 POWDER, FOR SOLUTION ORAL at 10:57

## 2019-12-07 RX ADMIN — LIDOCAINE 1 PATCH: 4 CREAM TOPICAL at 22:15

## 2019-12-07 RX ADMIN — Medication 166.67 MILLIGRAM(S): at 21:37

## 2019-12-07 RX ADMIN — OXYCODONE AND ACETAMINOPHEN 2 TABLET(S): 5; 325 TABLET ORAL at 23:40

## 2019-12-07 RX ADMIN — OXYCODONE AND ACETAMINOPHEN 2 TABLET(S): 5; 325 TABLET ORAL at 05:30

## 2019-12-07 RX ADMIN — LIDOCAINE 1 PATCH: 4 CREAM TOPICAL at 10:56

## 2019-12-07 RX ADMIN — Medication 100 MILLIGRAM(S): at 14:38

## 2019-12-07 RX ADMIN — LIDOCAINE 1 PATCH: 4 CREAM TOPICAL at 19:19

## 2019-12-07 RX ADMIN — CEFEPIME 100 MILLIGRAM(S): 1 INJECTION, POWDER, FOR SOLUTION INTRAMUSCULAR; INTRAVENOUS at 17:01

## 2019-12-07 RX ADMIN — Medication 1 APPLICATION(S): at 21:37

## 2019-12-07 RX ADMIN — Medication 100 MILLIGRAM(S): at 21:37

## 2019-12-07 RX ADMIN — Medication 100 MILLIGRAM(S): at 06:19

## 2019-12-07 RX ADMIN — PANTOPRAZOLE SODIUM 40 MILLIGRAM(S): 20 TABLET, DELAYED RELEASE ORAL at 06:18

## 2019-12-07 NOTE — PROGRESS NOTE ADULT - SUBJECTIVE AND OBJECTIVE BOX
Follow-up Pulm Progress Note    Cough improving, was able to sleep through night  Sats 97% RA    Medications:  MEDICATIONS  (STANDING):  benzocaine 15 mG/menthol 3.6 mG Lozenge 1 Lozenge Oral two times a day  benzonatate 100 milliGRAM(s) Oral three times a day  cefepime   IVPB 2000 milliGRAM(s) IV Intermittent every 12 hours  enoxaparin Injectable 40 milliGRAM(s) SubCutaneous daily  fluticasone propionate 50 MICROgram(s)/spray Nasal Spray 1 Spray(s) Both Nostrils two times a day  guaiFENesin ER 1200 milliGRAM(s) Oral every 12 hours  influenza   Vaccine 0.5 milliLiter(s) IntraMuscular once  lidocaine   Patch 1 Patch Transdermal daily  pantoprazole    Tablet 40 milliGRAM(s) Oral two times a day  polyethylene glycol 3350 17 Gram(s) Oral daily  pregabalin 150 milliGRAM(s) Oral every 8 hours  senna 2 Tablet(s) Oral at bedtime  sodium chloride 0.65% Nasal 1 Spray(s) Both Nostrils every 6 hours  sodium chloride 0.9%. 1000 milliLiter(s) (75 mL/Hr) IV Continuous <Continuous>  triamcinolone 0.1% Cream 1 Application(s) Topical three times a day  vancomycin  IVPB 1250 milliGRAM(s) IV Intermittent every 12 hours    MEDICATIONS  (PRN):  hydrocodone/homatropine Syrup 5 milliLiter(s) Oral every 6 hours PRN Cough  oxycodone    5 mG/acetaminophen 325 mG 2 Tablet(s) Oral every 6 hours PRN Moderate Pain (4 - 6)          Vital Signs Last 24 Hrs  T(C): 36.8 (07 Dec 2019 04:32), Max: 36.8 (07 Dec 2019 04:32)  T(F): 98.2 (07 Dec 2019 04:32), Max: 98.2 (07 Dec 2019 04:32)  HR: 69 (07 Dec 2019 04:32) (69 - 86)  BP: 104/65 (07 Dec 2019 04:32) (104/65 - 116/80)  BP(mean): --  RR: 17 (07 Dec 2019 04:32) (16 - 18)  SpO2: 97% (07 Dec 2019 04:32) (96% - 97%)          12-06 @ 07:01  -  12-07 @ 07:00  --------------------------------------------------------  IN: 784 mL / OUT: 1000 mL / NET: -216 mL                  Fluid characteristics  -- 12-04 @ 17:03  pH --  LDH --  tprot 3.8    Cell count  Appearance Slightly Cloudy  Fluid type --  BF lymph 88  color Yellow  eosinophil --  PMN 0  Mesothelial --  Monocyte 12  Other body cells --      CULTURES:     Culture - Blood (collected 11-30-19 @ 15:05)  Source: .Blood Blood-Peripheral  Final Report (12-05-19 @ 16:00):    No growth at 5 days.            Culture - Body Fluid with Gram Stain (collected 12-04-19 @ 22:06)  Source: .Body Fluid Synovial Fluid  Gram Stain (12-04-19 @ 23:17):    Rare polymorphonuclear leukocytes per low power field    No organisms seen  Preliminary Report (12-05-19 @ 15:44):    No growth            Culture - Fungal, Body Fluid (collected 12-04-19 @ 22:06)  Source: .Body Fluid Synovial Fluid  Preliminary Report (12-05-19 @ 11:05):    Testing in progress            Physical Examination:  PULM: Clear to auscultation bilaterally, no significant sputum production  CVS: RRR    RADIOLOGY REVIEWED  CT sinuses:   FINDINGS:  POSTOPERATIVE or POST TRAUMATIC CHANGE:   None.    SINOCRANIAL AND SINOORBITAL JUNCTIONS:  The bones adjacent to the sinuses, including the lamina papyracea,   cribriform plate and fovea ethmoidalis, are intact. There is mild   flattening of the left fovea ethmoidalis (5:56).    FRONTAL SINUSES, DRAINAGE PATHWAYS AND ASSOCIATED AREAS:    The frontal sinuses are developed and well aerated. Mucosal thickening,   retention cyst/polyp, partially obstructs  right frontal sinus outflow   tract along the inferior medial right frontal sinus (5:35). Left frontal   sinus and left frontal sinus tract are well-pneumatized and clear. Sinus   outflow tracts are narrowed by excess reflect frontoethmoidal air cells,   there is a conchal bullosa in the neck of the right middle turbinate,and   focal apposition of the bilateral anterior ethmoid labyrinth to the   vertical of the middle turbinates with bilateral agger nasi cells.    NASAL SEPTUM:  Deviation of cartilaginous nasal septum, convexity to the left, with bony   spur of osseous nasal septum narrowing  neck of left inferior meatus   (5:54).    NASAL CAVITY:  Evaluation of the nasal cavity fails to demonstrate dominant masses.    ETHMOID SINUSES: There is mucosal thickening within the ethmoid sinuses,   with increased opacification possible lesion cyst or polyp in the right   posterior inferior ethmoidal air cells, (3:49, 7:97, 5:54).    MAXILLARY SINUSES, DRAINAGE PATHWAYS AND ASSOCIATED AREAS:    Right ostiomeatal complex is patent, right maxillary sinus is well   developed and aerated, slightly hypoplastic left maxillary sinus, left   ostiomeatal complex   narrowed secondary lateralized uncinate process   approximating left inferior medial lamina papyracea, (5:46), patent left   maxillary accessory ostia (5:57), with laterally deviated left medial   maxillary sinus margin, hypoplastic otherwise clear left maxillary antra.   Left maxillary molar teeth  alveolus extend to the maxillary antra, with   periapical lucency concerning for dental disease that could bebetter   assessed with dental inspection (5:68).    SPHENOID SINUSES, DRAINAGE PATHWAYS AND ASSOCIATED AREAS:  The sphenoid sinuses are aerated, right-sided greater than left, mucosal   thickening with possible tiny retention cysts and polyps in the right   sphenoid sinus. The sphenoethmoidal recesses are free of abnormal soft   tissue bilaterally.    OTHER:  Evaluation of the nasopharynx demonstrates prominent nasopharyngeal   adenoidal soft tissue, likely reactive. The mastoid air cells are   demonstrate scattered opacification. Visualized intraorbital   compartments, and deep spaces of the suprahyoid neck are within normal   limits.    Limited evaluation of the orbits demonstrates slightly prominent lacrimal   glands, orbital globes and and brain parenchyma are unremarkable.      IMPRESSION:  Right inferior medial frontal sinus retention cyst and/or polyp, right   posterior inferior ethmoidal air cell opacification, mucosal thickening   and possible small sphenoid sinus retention cysts/polyps. Left posterior   maxillary molar periapical lucency extends to left maxillary sinus floor.   Variations in the configuration of the anatomy may predispose the patient   to recurrent episodes of inflammatory disease. Please see discussion   above.

## 2019-12-07 NOTE — PROGRESS NOTE ADULT - SUBJECTIVE AND OBJECTIVE BOX
INTERVAL HPI/OVERNIGHT EVENTS: Seen and examined earlier . Has birthday today. Walking.   Vital Signs Last 24 Hrs  T(C): 36.7 (07 Dec 2019 11:51), Max: 36.8 (07 Dec 2019 04:32)  T(F): 98 (07 Dec 2019 11:51), Max: 98.2 (07 Dec 2019 04:32)  HR: 70 (07 Dec 2019 11:51) (69 - 86)  BP: 119/69 (07 Dec 2019 11:51) (104/65 - 119/69)  BP(mean): --  RR: 17 (07 Dec 2019 11:51) (16 - 17)  SpO2: 98% (07 Dec 2019 11:51) (96% - 98%)  I&O's Summary    06 Dec 2019 07:01  -  07 Dec 2019 07:00  --------------------------------------------------------  IN: 784 mL / OUT: 1000 mL / NET: -216 mL    07 Dec 2019 07:01  -  07 Dec 2019 18:30  --------------------------------------------------------  IN: 360 mL / OUT: 0 mL / NET: 360 mL      MEDICATIONS  (STANDING):  benzocaine 15 mG/menthol 3.6 mG Lozenge 1 Lozenge Oral two times a day  benzonatate 100 milliGRAM(s) Oral three times a day  cefepime   IVPB 2000 milliGRAM(s) IV Intermittent every 12 hours  enoxaparin Injectable 40 milliGRAM(s) SubCutaneous daily  fluticasone propionate 50 MICROgram(s)/spray Nasal Spray 1 Spray(s) Both Nostrils two times a day  guaiFENesin ER 1200 milliGRAM(s) Oral every 12 hours  influenza   Vaccine 0.5 milliLiter(s) IntraMuscular once  lidocaine   Patch 1 Patch Transdermal daily  pantoprazole    Tablet 40 milliGRAM(s) Oral two times a day  polyethylene glycol 3350 17 Gram(s) Oral daily  pregabalin 150 milliGRAM(s) Oral every 8 hours  senna 2 Tablet(s) Oral at bedtime  sodium chloride 0.65% Nasal 1 Spray(s) Both Nostrils every 6 hours  sodium chloride 0.9%. 1000 milliLiter(s) (75 mL/Hr) IV Continuous <Continuous>  triamcinolone 0.1% Cream 1 Application(s) Topical three times a day  vancomycin  IVPB 1250 milliGRAM(s) IV Intermittent every 12 hours    MEDICATIONS  (PRN):  hydrocodone/homatropine Syrup 5 milliLiter(s) Oral every 6 hours PRN Cough  oxycodone    5 mG/acetaminophen 325 mG 2 Tablet(s) Oral every 6 hours PRN Moderate Pain (4 - 6)    LABS:                        10.0   5.75  )-----------( 264      ( 07 Dec 2019 13:28 )             33.0     12-07    135  |  101  |  10  ----------------------------<  128<H>  4.1   |  21<L>  |  0.54    Ca    8.9      07 Dec 2019 11:12          CAPILLARY BLOOD GLUCOSE              REVIEW OF SYSTEMS:  CONSTITUTIONAL: No fever, weight loss, or fatigue  EYES: No eye pain, visual disturbances, or discharge  ENMT:  No difficulty hearing, tinnitus, vertigo; No sinus or throat pain  NECK: No pain or stiffness  RESPIRATORY: No cough, wheezing, chills or hemoptysis; No shortness of breath  CARDIOVASCULAR: No chest pain, palpitations, dizziness, or leg swelling  GASTROINTESTINAL: No abdominal or epigastric pain. No nausea, vomiting, or hematemesis; No diarrhea or constipation. No melena or hematochezia.  GENITOURINARY: No dysuria, frequency, hematuria, or incontinence  NEUROLOGICAL: No headaches, memory loss, loss of strength, numbness, or tremors      RADIOLOGY & ADDITIONAL TESTS:    Consultant(s) Notes Reviewed:  [x ] YES  [ ] NO    PHYSICAL EXAM:  GENERAL: NAD,Obese   HEAD:  Atraumatic, Normocephalic  EYES: EOMI, PERRLA, conjunctiva and sclera clear  ENMT: No tonsillar erythema, exudates, or enlargement; Moist mucous membranes, Good dentition, No lesions  NECK: Supple, No JVD, Normal thyroid  NERVOUS SYSTEM:  Alert & Oriented X3, No focal deficit   CHEST/LUNG: Good air entry bilateral with no  rales, rhonchi, wheezing, or rubs  HEART: Regular rate and rhythm; No murmurs, rubs, or gallops  ABDOMEN: Soft, Nontender, Nondistended; Bowel sounds present  EXTREMITIES:  2+ Peripheral Pulses, No clubbing, cyanosis, or edema    Care Discussed with Consultants/Other Providers [ x] YES  [ ] NO

## 2019-12-07 NOTE — PROGRESS NOTE ADULT - ASSESSMENT
42yo F pmhx of fibromyalgia, anemia, gastric bypass in 2006 at [place], she was admitted to [facility] surgery in 2015 with epigastric pain, found to have ulcer at the gastric staple line, managed with PPI, then discharged, presents here with one day of BRBPR. No history of constipation, she had hemorrhoids when she was pregnant but resolved, no surgeries for hemorrhoids, no chronic anal pain. She reports that after the BRBPR yesterday 1x episode, later in day she had a vaginal bleeding (Believes this is period, right time and has been "bleeding heavily with periods for 4 months". Feeling weaker more sob with bleeding. Does not take motrin, does not smoke.      Problem/Plan - 1:  ·  Problem: GIB (gastrointestinal bleeding).  Plan: Hemodynamically stable. GI and Colorectal help appreciated. S/P EGD and Colonoscopy. HH stable.     Problem/Plan - 2:  ·  Problem: Anemia due to acute blood loss.  Plan: Transfusing to keep Hgb>9 G . IV iron and cbc stable.      Problem/Plan - 3:  ·  Problem:  Fever and chills .  Plan: RVP ,Chest Xray noted  and cultures noted.  No Leucocytosis . CT chest noted.     < from: CT Chest No Cont (11.28.19 @ 16:27) >  IMPRESSION:     Clear lungs.    3.5 x 2.5 cm incompletely imaged subcutaneous fluid collection with   fistulous tract to the skin surface within the mid lower back at the   level of L1 vertebral body. Recommend correlation with physical exam.    These findings were discussed NP Xavier by Dr. Boykin on 11/29/19 10:22   AM.    < end of copied text >  ID and [facility] helping.      Problem/Plan - 4:  ·  Problem: Back pain with radiculopathy with Fluid Collection .  Plan: H/O Surgeries and will f/u with her spinal surgeon.   Nsx consult noted. MRI < from: MR Lumbar Spine w/wo IV Cont (11.29.19 @ 23:49) >  Impression: There is a large fluid collection with the measurements given   above in the subcutaneous fat. The patient has undergone a laminectomy   between the prior CT of 8/28/2018 and the current evaluation at the L5-S1   level. That operation and associated imaging was not done in this   hospital. The collection described does not communicate or extend into   the paraspinal musculature or into the region of the spinal canal. It   does enhance around its rim. The fat on the T1-weighted sequences around   the region appears pristine. This collection may likely be a benign fluid   collection however, possibility of an infected collection is not entirely   excluded. Clinical correlation is necessary.    < end of copied text >  and Aspiration by IR today of 50 cc clear yellow fluid . IV Abxs started by ID . Awaiting final c/s .      Problem/Plan - 5:  ·  Problem: Chronic Cough . .  Plan: CT chest noted. Pulmonary and ENT help appreciated. Pt already on PPI .CT sinuses noted.      Problem/Plan - 6:  Problem: Obesity. Plan: H/O Gastric Bypass and Bariatric surgery follow up noted.     Problem/Plan - 7:  ·  Problem: Fibromyalgia.  Plan: Home meds.

## 2019-12-08 LAB
ANION GAP SERPL CALC-SCNC: 11 MMOL/L — SIGNIFICANT CHANGE UP (ref 5–17)
BUN SERPL-MCNC: 9 MG/DL — SIGNIFICANT CHANGE UP (ref 7–23)
CALCIUM SERPL-MCNC: 8.5 MG/DL — SIGNIFICANT CHANGE UP (ref 8.4–10.5)
CHLORIDE SERPL-SCNC: 102 MMOL/L — SIGNIFICANT CHANGE UP (ref 96–108)
CO2 SERPL-SCNC: 23 MMOL/L — SIGNIFICANT CHANGE UP (ref 22–31)
CREAT SERPL-MCNC: 0.71 MG/DL — SIGNIFICANT CHANGE UP (ref 0.5–1.3)
GLUCOSE SERPL-MCNC: 110 MG/DL — HIGH (ref 70–99)
HCT VFR BLD CALC: 33.8 % — LOW (ref 34.5–45)
HGB BLD-MCNC: 10.3 G/DL — LOW (ref 11.5–15.5)
HIV 1+2 AB+HIV1 P24 AG SERPL QL IA: SIGNIFICANT CHANGE UP
MCHC RBC-ENTMCNC: 26.9 PG — LOW (ref 27–34)
MCHC RBC-ENTMCNC: 30.5 GM/DL — LOW (ref 32–36)
MCV RBC AUTO: 88.3 FL — SIGNIFICANT CHANGE UP (ref 80–100)
PLATELET # BLD AUTO: 265 K/UL — SIGNIFICANT CHANGE UP (ref 150–400)
POTASSIUM SERPL-MCNC: 4.4 MMOL/L — SIGNIFICANT CHANGE UP (ref 3.5–5.3)
POTASSIUM SERPL-SCNC: 4.4 MMOL/L — SIGNIFICANT CHANGE UP (ref 3.5–5.3)
RBC # BLD: 3.83 M/UL — SIGNIFICANT CHANGE UP (ref 3.8–5.2)
RBC # FLD: 22.6 % — HIGH (ref 10.3–14.5)
SODIUM SERPL-SCNC: 136 MMOL/L — SIGNIFICANT CHANGE UP (ref 135–145)
VANCOMYCIN TROUGH SERPL-MCNC: 6.3 UG/ML — LOW (ref 10–20)
WBC # BLD: 6.72 K/UL — SIGNIFICANT CHANGE UP (ref 3.8–10.5)
WBC # FLD AUTO: 6.72 K/UL — SIGNIFICANT CHANGE UP (ref 3.8–10.5)

## 2019-12-08 RX ORDER — DIPHENHYDRAMINE HCL 50 MG
25 CAPSULE ORAL EVERY 12 HOURS
Refills: 0 | Status: DISCONTINUED | OUTPATIENT
Start: 2019-12-08 | End: 2019-12-08

## 2019-12-08 RX ORDER — DIPHENHYDRAMINE HCL 50 MG
25 CAPSULE ORAL EVERY 12 HOURS
Refills: 0 | Status: DISCONTINUED | OUTPATIENT
Start: 2019-12-08 | End: 2019-12-13

## 2019-12-08 RX ADMIN — Medication 1 SPRAY(S): at 00:09

## 2019-12-08 RX ADMIN — Medication 1 APPLICATION(S): at 11:43

## 2019-12-08 RX ADMIN — OXYCODONE AND ACETAMINOPHEN 2 TABLET(S): 5; 325 TABLET ORAL at 05:42

## 2019-12-08 RX ADMIN — LIDOCAINE 1 PATCH: 4 CREAM TOPICAL at 19:08

## 2019-12-08 RX ADMIN — BENZOCAINE AND MENTHOL 1 LOZENGE: 5; 1 LIQUID ORAL at 17:49

## 2019-12-08 RX ADMIN — Medication 100 MILLIGRAM(S): at 05:31

## 2019-12-08 RX ADMIN — OXYCODONE AND ACETAMINOPHEN 2 TABLET(S): 5; 325 TABLET ORAL at 12:29

## 2019-12-08 RX ADMIN — Medication 1200 MILLIGRAM(S): at 17:49

## 2019-12-08 RX ADMIN — LIDOCAINE 1 PATCH: 4 CREAM TOPICAL at 11:42

## 2019-12-08 RX ADMIN — OXYCODONE AND ACETAMINOPHEN 2 TABLET(S): 5; 325 TABLET ORAL at 18:35

## 2019-12-08 RX ADMIN — Medication 25 MILLIGRAM(S): at 10:10

## 2019-12-08 RX ADMIN — Medication 1200 MILLIGRAM(S): at 05:31

## 2019-12-08 RX ADMIN — LIDOCAINE 1 PATCH: 4 CREAM TOPICAL at 23:03

## 2019-12-08 RX ADMIN — Medication 100 MILLIGRAM(S): at 13:50

## 2019-12-08 RX ADMIN — Medication 1 APPLICATION(S): at 21:39

## 2019-12-08 RX ADMIN — PANTOPRAZOLE SODIUM 40 MILLIGRAM(S): 20 TABLET, DELAYED RELEASE ORAL at 05:31

## 2019-12-08 RX ADMIN — OXYCODONE AND ACETAMINOPHEN 2 TABLET(S): 5; 325 TABLET ORAL at 17:49

## 2019-12-08 RX ADMIN — ENOXAPARIN SODIUM 40 MILLIGRAM(S): 100 INJECTION SUBCUTANEOUS at 11:42

## 2019-12-08 RX ADMIN — OXYCODONE AND ACETAMINOPHEN 2 TABLET(S): 5; 325 TABLET ORAL at 11:41

## 2019-12-08 RX ADMIN — BENZOCAINE AND MENTHOL 1 LOZENGE: 5; 1 LIQUID ORAL at 05:31

## 2019-12-08 RX ADMIN — Medication 1 SPRAY(S): at 17:49

## 2019-12-08 RX ADMIN — Medication 150 MILLIGRAM(S): at 00:09

## 2019-12-08 RX ADMIN — Medication 166.67 MILLIGRAM(S): at 10:10

## 2019-12-08 RX ADMIN — CEFEPIME 100 MILLIGRAM(S): 1 INJECTION, POWDER, FOR SOLUTION INTRAMUSCULAR; INTRAVENOUS at 18:43

## 2019-12-08 RX ADMIN — Medication 166.67 MILLIGRAM(S): at 21:40

## 2019-12-08 RX ADMIN — Medication 150 MILLIGRAM(S): at 09:22

## 2019-12-08 RX ADMIN — Medication 1 SPRAY(S): at 05:30

## 2019-12-08 RX ADMIN — Medication 1 APPLICATION(S): at 05:30

## 2019-12-08 RX ADMIN — PANTOPRAZOLE SODIUM 40 MILLIGRAM(S): 20 TABLET, DELAYED RELEASE ORAL at 16:50

## 2019-12-08 RX ADMIN — Medication 100 MILLIGRAM(S): at 21:40

## 2019-12-08 RX ADMIN — SENNA PLUS 2 TABLET(S): 8.6 TABLET ORAL at 21:40

## 2019-12-08 RX ADMIN — OXYCODONE AND ACETAMINOPHEN 2 TABLET(S): 5; 325 TABLET ORAL at 06:17

## 2019-12-08 RX ADMIN — CEFEPIME 100 MILLIGRAM(S): 1 INJECTION, POWDER, FOR SOLUTION INTRAMUSCULAR; INTRAVENOUS at 05:32

## 2019-12-08 RX ADMIN — Medication 1 SPRAY(S): at 17:50

## 2019-12-08 RX ADMIN — Medication 25 MILLIGRAM(S): at 21:40

## 2019-12-08 RX ADMIN — POLYETHYLENE GLYCOL 3350 17 GRAM(S): 17 POWDER, FOR SOLUTION ORAL at 11:43

## 2019-12-08 RX ADMIN — Medication 150 MILLIGRAM(S): at 16:50

## 2019-12-08 RX ADMIN — Medication 1 SPRAY(S): at 11:43

## 2019-12-08 NOTE — PROGRESS NOTE ADULT - SUBJECTIVE AND OBJECTIVE BOX
INTERVAL HPI/OVERNIGHT EVENTS: I feel better and going to walk .   Vital Signs Last 24 Hrs  T(C): 36.6 (08 Dec 2019 11:31), Max: 36.6 (08 Dec 2019 11:31)  T(F): 97.9 (08 Dec 2019 11:31), Max: 97.9 (08 Dec 2019 11:31)  HR: 85 (08 Dec 2019 11:31) (84 - 93)  BP: 115/72 (08 Dec 2019 11:31) (101/69 - 115/72)  BP(mean): --  RR: 18 (08 Dec 2019 11:31) (18 - 18)  SpO2: 97% (08 Dec 2019 11:31) (96% - 97%)  I&O's Summary    07 Dec 2019 07:01  -  08 Dec 2019 07:00  --------------------------------------------------------  IN: 1380 mL / OUT: 0 mL / NET: 1380 mL    08 Dec 2019 07:01  -  08 Dec 2019 11:55  --------------------------------------------------------  IN: 100 mL / OUT: 0 mL / NET: 100 mL      MEDICATIONS  (STANDING):  benzocaine 15 mG/menthol 3.6 mG Lozenge 1 Lozenge Oral two times a day  benzonatate 100 milliGRAM(s) Oral three times a day  cefepime   IVPB 2000 milliGRAM(s) IV Intermittent every 12 hours  enoxaparin Injectable 40 milliGRAM(s) SubCutaneous daily  fluticasone propionate 50 MICROgram(s)/spray Nasal Spray 1 Spray(s) Both Nostrils two times a day  guaiFENesin ER 1200 milliGRAM(s) Oral every 12 hours  influenza   Vaccine 0.5 milliLiter(s) IntraMuscular once  lidocaine   Patch 1 Patch Transdermal daily  pantoprazole    Tablet 40 milliGRAM(s) Oral two times a day  polyethylene glycol 3350 17 Gram(s) Oral daily  pregabalin 150 milliGRAM(s) Oral every 8 hours  senna 2 Tablet(s) Oral at bedtime  sodium chloride 0.65% Nasal 1 Spray(s) Both Nostrils every 6 hours  sodium chloride 0.9%. 1000 milliLiter(s) (75 mL/Hr) IV Continuous <Continuous>  triamcinolone 0.1% Cream 1 Application(s) Topical three times a day  vancomycin  IVPB 1250 milliGRAM(s) IV Intermittent every 12 hours    MEDICATIONS  (PRN):  diphenhydrAMINE   Injectable 25 milliGRAM(s) IV Push every 12 hours PRN premedication  hydrocodone/homatropine Syrup 5 milliLiter(s) Oral every 6 hours PRN Cough  oxycodone    5 mG/acetaminophen 325 mG 2 Tablet(s) Oral every 6 hours PRN Moderate Pain (4 - 6)    LABS:                        10.0   5.75  )-----------( 264      ( 07 Dec 2019 13:28 )             33.0     12-08    136  |  102  |  9   ----------------------------<  110<H>  4.4   |  23  |  0.71    Ca    8.5      08 Dec 2019 10:43          CAPILLARY BLOOD GLUCOSE              REVIEW OF SYSTEMS:  CONSTITUTIONAL: No fever, weight loss, or fatigue  EYES: No eye pain, visual disturbances, or discharge  ENMT:  No difficulty hearing, tinnitus, vertigo; No sinus or throat pain  NECK: No pain or stiffness  BREASTS: No pain, masses, or nipple discharge  RESPIRATORY: No cough, wheezing, chills or hemoptysis; No shortness of breath  CARDIOVASCULAR: No chest pain, palpitations, dizziness, or leg swelling  GASTROINTESTINAL: No abdominal or epigastric pain. No nausea, vomiting, or hematemesis; No diarrhea or constipation. No melena or hematochezia.  GENITOURINARY: No dysuria, frequency, hematuria, or incontinence  NEUROLOGICAL: No headaches, memory loss, loss of strength, numbness, or tremors    Consultant(s) Notes Reviewed:  [x ] YES  [ ] NO    PHYSICAL EXAM:  GENERAL: NAD, well-groomed, well-developed ,not in any distress ,  HEAD:  Atraumatic, Normocephalic  EYES: EOMI, PERRLA, conjunctiva and sclera clear  ENMT: No tonsillar erythema, exudates, or enlargement; Moist mucous membranes, Good dentition, No lesions  NECK: Supple, No JVD, Normal thyroid  NERVOUS SYSTEM:  Alert & Oriented X3, No focal deficit   CHEST/LUNG: Good air entry bilateral with no  rales, rhonchi, wheezing, or rubs  HEART: Regular rate and rhythm; No murmurs, rubs, or gallops  ABDOMEN: Soft, Nontender, Nondistended; Bowel sounds present  EXTREMITIES:  2+ Peripheral Pulses, No clubbing, cyanosis, or edema  Care Discussed with Consultants/Other Providers [ x] YES  [ ] NO

## 2019-12-09 PROCEDURE — 99232 SBSQ HOSP IP/OBS MODERATE 35: CPT

## 2019-12-09 RX ORDER — LACTOBACILLUS ACIDOPHILUS 100MM CELL
1 CAPSULE ORAL DAILY
Refills: 0 | Status: DISCONTINUED | OUTPATIENT
Start: 2019-12-09 | End: 2019-12-13

## 2019-12-09 RX ORDER — OXYCODONE AND ACETAMINOPHEN 5; 325 MG/1; MG/1
2 TABLET ORAL EVERY 6 HOURS
Refills: 0 | Status: DISCONTINUED | OUTPATIENT
Start: 2019-12-09 | End: 2019-12-13

## 2019-12-09 RX ADMIN — Medication 1 SPRAY(S): at 18:32

## 2019-12-09 RX ADMIN — OXYCODONE AND ACETAMINOPHEN 2 TABLET(S): 5; 325 TABLET ORAL at 06:22

## 2019-12-09 RX ADMIN — Medication 1 SPRAY(S): at 13:01

## 2019-12-09 RX ADMIN — OXYCODONE AND ACETAMINOPHEN 2 TABLET(S): 5; 325 TABLET ORAL at 13:59

## 2019-12-09 RX ADMIN — ENOXAPARIN SODIUM 40 MILLIGRAM(S): 100 INJECTION SUBCUTANEOUS at 13:00

## 2019-12-09 RX ADMIN — OXYCODONE AND ACETAMINOPHEN 2 TABLET(S): 5; 325 TABLET ORAL at 13:10

## 2019-12-09 RX ADMIN — CEFEPIME 100 MILLIGRAM(S): 1 INJECTION, POWDER, FOR SOLUTION INTRAMUSCULAR; INTRAVENOUS at 05:29

## 2019-12-09 RX ADMIN — Medication 1 APPLICATION(S): at 13:00

## 2019-12-09 RX ADMIN — LIDOCAINE 1 PATCH: 4 CREAM TOPICAL at 20:20

## 2019-12-09 RX ADMIN — Medication 150 MILLIGRAM(S): at 16:51

## 2019-12-09 RX ADMIN — BENZOCAINE AND MENTHOL 1 LOZENGE: 5; 1 LIQUID ORAL at 05:29

## 2019-12-09 RX ADMIN — SENNA PLUS 2 TABLET(S): 8.6 TABLET ORAL at 23:12

## 2019-12-09 RX ADMIN — LIDOCAINE 1 PATCH: 4 CREAM TOPICAL at 23:15

## 2019-12-09 RX ADMIN — Medication 1 SPRAY(S): at 23:13

## 2019-12-09 RX ADMIN — Medication 1 SPRAY(S): at 00:16

## 2019-12-09 RX ADMIN — OXYCODONE AND ACETAMINOPHEN 2 TABLET(S): 5; 325 TABLET ORAL at 00:50

## 2019-12-09 RX ADMIN — BENZOCAINE AND MENTHOL 1 LOZENGE: 5; 1 LIQUID ORAL at 18:32

## 2019-12-09 RX ADMIN — Medication 150 MILLIGRAM(S): at 00:16

## 2019-12-09 RX ADMIN — LIDOCAINE 1 PATCH: 4 CREAM TOPICAL at 13:00

## 2019-12-09 RX ADMIN — Medication 1 APPLICATION(S): at 05:30

## 2019-12-09 RX ADMIN — Medication 100 MILLIGRAM(S): at 05:29

## 2019-12-09 RX ADMIN — Medication 1 APPLICATION(S): at 23:13

## 2019-12-09 RX ADMIN — PANTOPRAZOLE SODIUM 40 MILLIGRAM(S): 20 TABLET, DELAYED RELEASE ORAL at 16:50

## 2019-12-09 RX ADMIN — Medication 150 MILLIGRAM(S): at 08:43

## 2019-12-09 RX ADMIN — Medication 1 SPRAY(S): at 05:30

## 2019-12-09 RX ADMIN — OXYCODONE AND ACETAMINOPHEN 2 TABLET(S): 5; 325 TABLET ORAL at 06:53

## 2019-12-09 RX ADMIN — OXYCODONE AND ACETAMINOPHEN 2 TABLET(S): 5; 325 TABLET ORAL at 00:16

## 2019-12-09 RX ADMIN — Medication 1 SPRAY(S): at 05:29

## 2019-12-09 RX ADMIN — Medication 1 SPRAY(S): at 18:31

## 2019-12-09 RX ADMIN — PANTOPRAZOLE SODIUM 40 MILLIGRAM(S): 20 TABLET, DELAYED RELEASE ORAL at 05:29

## 2019-12-09 RX ADMIN — Medication 1200 MILLIGRAM(S): at 05:29

## 2019-12-09 RX ADMIN — OXYCODONE AND ACETAMINOPHEN 2 TABLET(S): 5; 325 TABLET ORAL at 20:17

## 2019-12-09 RX ADMIN — Medication 100 MILLIGRAM(S): at 23:12

## 2019-12-09 RX ADMIN — Medication 1 TABLET(S): at 18:31

## 2019-12-09 RX ADMIN — POLYETHYLENE GLYCOL 3350 17 GRAM(S): 17 POWDER, FOR SOLUTION ORAL at 13:00

## 2019-12-09 RX ADMIN — Medication 100 MILLIGRAM(S): at 13:00

## 2019-12-09 RX ADMIN — Medication 150 MILLIGRAM(S): at 23:12

## 2019-12-09 RX ADMIN — OXYCODONE AND ACETAMINOPHEN 2 TABLET(S): 5; 325 TABLET ORAL at 19:35

## 2019-12-09 NOTE — PROGRESS NOTE ADULT - SUBJECTIVE AND OBJECTIVE BOX
44y old  Female who presents with a chief complaint of bleeding per rectum (09 Dec 2019 11:31)      Interval history:  Afebrile, cough improving, still with pain in lt leg radiating down the thigh laterally.     Allergies:   IV Contrast (Hives)  shellfish (Hives; Rash)      Antimicrobials:  amoxicillin  875 milliGRAM(s)/clavulanate 1 Tablet(s) Oral every 12 hours      REVIEW OF SYSTEMS:  No chest pain   No SOB  No N/V  No dysuria  No rash.       Vital Signs Last 24 Hrs  T(C): 36.6 (12-09-19 @ 14:48), Max: 36.7 (12-09-19 @ 05:13)  T(F): 97.8 (12-09-19 @ 14:48), Max: 98 (12-09-19 @ 05:13)  HR: 84 (12-09-19 @ 14:48) (77 - 88)  BP: 107/65 (12-09-19 @ 14:48) (107/65 - 123/66)  BP(mean): --  RR: 18 (12-09-19 @ 14:48) (18 - 18)  SpO2: 97% (12-09-19 @ 14:48) (96% - 97%)      PHYSICAL EXAM:  Patient in no acute distress. AAOX3. Sitting in bed.   Cardiovascular: S1S2 normal.  Lungs: + air entry B/L lung fields.  Gastrointestinal: soft, nontender, nondistended.  Extremities: no edema.  IV sites not inflamed.   no rash.                           10.3   6.72  )-----------( 265      ( 08 Dec 2019 12:44 )             33.8   12-08    136  |  102  |  9   ----------------------------<  110<H>  4.4   |  23  |  0.71    Ca    8.5      08 Dec 2019 10:43      Culture - Body Fluid with Gram Stain (12.04.19 @ 22:06)    Gram Stain:   Rare polymorphonuclear leukocytes per low power field  No organisms seen    Specimen Source: .Body Fluid Synovial Fluid    Culture Results:   No growth

## 2019-12-09 NOTE — PROGRESS NOTE ADULT - SUBJECTIVE AND OBJECTIVE BOX
INTERVAL HPI/OVERNIGHT EVENTS: Back pain going down left thigh.   Vital Signs Last 24 Hrs  T(C): 36.6 (09 Dec 2019 14:48), Max: 36.7 (09 Dec 2019 05:13)  T(F): 97.8 (09 Dec 2019 14:48), Max: 98 (09 Dec 2019 05:13)  HR: 84 (09 Dec 2019 14:48) (77 - 88)  BP: 107/65 (09 Dec 2019 14:48) (107/65 - 123/66)  BP(mean): --  RR: 18 (09 Dec 2019 14:48) (18 - 18)  SpO2: 97% (09 Dec 2019 14:48) (96% - 97%)  I&O's Summary    08 Dec 2019 07:01  -  09 Dec 2019 07:00  --------------------------------------------------------  IN: 1540 mL / OUT: 0 mL / NET: 1540 mL    09 Dec 2019 07:01  -  09 Dec 2019 16:49  --------------------------------------------------------  IN: 960 mL / OUT: 0 mL / NET: 960 mL      MEDICATIONS  (STANDING):  amoxicillin  875 milliGRAM(s)/clavulanate 1 Tablet(s) Oral every 12 hours  benzocaine 15 mG/menthol 3.6 mG Lozenge 1 Lozenge Oral two times a day  benzonatate 100 milliGRAM(s) Oral three times a day  enoxaparin Injectable 40 milliGRAM(s) SubCutaneous daily  fluticasone propionate 50 MICROgram(s)/spray Nasal Spray 1 Spray(s) Both Nostrils two times a day  influenza   Vaccine 0.5 milliLiter(s) IntraMuscular once  lidocaine   Patch 1 Patch Transdermal daily  pantoprazole    Tablet 40 milliGRAM(s) Oral two times a day  polyethylene glycol 3350 17 Gram(s) Oral daily  pregabalin 150 milliGRAM(s) Oral every 8 hours  senna 2 Tablet(s) Oral at bedtime  sodium chloride 0.65% Nasal 1 Spray(s) Both Nostrils every 6 hours  sodium chloride 0.9%. 1000 milliLiter(s) (75 mL/Hr) IV Continuous <Continuous>  triamcinolone 0.1% Cream 1 Application(s) Topical three times a day    MEDICATIONS  (PRN):  diphenhydrAMINE   Injectable 25 milliGRAM(s) IV Push every 12 hours PRN premedication  hydrocodone/homatropine Syrup 5 milliLiter(s) Oral every 6 hours PRN Cough  oxycodone    5 mG/acetaminophen 325 mG 2 Tablet(s) Oral every 6 hours PRN Moderate Pain (4 - 6)    LABS:                        10.3   6.72  )-----------( 265      ( 08 Dec 2019 12:44 )             33.8     12-08    136  |  102  |  9   ----------------------------<  110<H>  4.4   |  23  |  0.71    Ca    8.5      08 Dec 2019 10:43          CAPILLARY BLOOD GLUCOSE              REVIEW OF SYSTEMS:  CONSTITUTIONAL: No fever, weight loss, or fatigue  EYES: No eye pain, visual disturbances, or discharge  ENMT:  No difficulty hearing, tinnitus, vertigo; No sinus or throat pain  NECK: No pain or stiffness  RESPIRATORY: No cough, wheezing, chills or hemoptysis; No shortness of breath  CARDIOVASCULAR: No chest pain, palpitations, dizziness, or leg swelling  GASTROINTESTINAL: No abdominal or epigastric pain. No nausea, vomiting, or hematemesis; No diarrhea or constipation. No melena or hematochezia.  GENITOURINARY: No dysuria, frequency, hematuria, or incontinence  NEUROLOGICAL: No headaches, memory loss, loss of strength, numbness, or tremors    Consultant(s) Notes Reviewed:  [x ] YES  [ ] NO    PHYSICAL EXAM:  GENERAL: NAD, well-groomed, well-developed,not in any distress ,  HEAD:  Atraumatic, Normocephalic  EYES: EOMI, PERRLA, conjunctiva and sclera clear  ENMT: No tonsillar erythema, exudates, or enlargement; Moist mucous membranes, Good dentition, No lesions  NECK: Supple, No JVD, Normal thyroid  NERVOUS SYSTEM:  Alert & Oriented X3, No focal deficit   CHEST/LUNG: Good air entry bilateral with no  rales, rhonchi, wheezing, or rubs  HEART: Regular rate and rhythm; No murmurs, rubs, or gallops  ABDOMEN: Soft, Nontender, Nondistended; Bowel sounds present  EXTREMITIES:  2+ Peripheral Pulses, No clubbing, cyanosis, or edema  SKIN: No rashes or lesions    Care Discussed with Consultants/Other Providers [ x] YES  [ ] NO

## 2019-12-09 NOTE — PROGRESS NOTE ADULT - ASSESSMENT
44yo F pmhx of fibromyalgia, anemia, gastric bypass in 2006 at [place], she was admitted to [facility] surgery in 2015 with epigastric pain, found to have ulcer at the gastric staple line, managed with PPI, then discharged, presents here with one day of BRBPR. No history of constipation, she had hemorrhoids when she was pregnant but resolved, no surgeries for hemorrhoids, no chronic anal pain. She reports that after the BRBPR yesterday 1x episode, later in day she had a vaginal bleeding (Believes this is period, right time and has been "bleeding heavily with periods for 4 months". Feeling weaker more sob with bleeding. Does not take motrin, does not smoke.      Problem/Plan - 1:  ·  Problem: GIB (gastrointestinal bleeding).  Plan: Hemodynamically stable. GI and Colorectal help appreciated. S/P EGD and Colonoscopy. HH stable.     Problem/Plan - 2:  ·  Problem: Anemia due to acute blood loss.  Plan: Transfusing to keep Hgb>9 G . IV iron and cbc stable.      Problem/Plan - 3:  ·  Problem:  Fever and chills .  Plan: RVP ,Chest Xray noted  and cultures noted.  No Leucocytosis . CT chest noted.     < from: CT Chest No Cont (11.28.19 @ 16:27) >  IMPRESSION:     Clear lungs.    3.5 x 2.5 cm incompletely imaged subcutaneous fluid collection with   fistulous tract to the skin surface within the mid lower back at the   level of L1 vertebral body. Recommend correlation with physical exam.    These findings were discussed NP Xavier by Dr. Boykin on 11/29/19 10:22   AM.    < end of copied text >  ID and Nsux Surgery helping.      Problem/Plan - 4:  ·  Problem: Back pain with radiculopathy with Fluid Collection .  Plan: H/O Surgeries and will f/u with her spinal surgeon.   Nsx consult noted. MRI < from: MR Lumbar Spine w/wo IV Cont (11.29.19 @ 23:49) >  Impression: There is a large fluid collection with the measurements given   above in the subcutaneous fat. The patient has undergone a laminectomy   between the prior CT of 8/28/2018 and the current evaluation at the L5-S1   level. That operation and associated imaging was not done in this   hospital. The collection described does not communicate or extend into   the paraspinal musculature or into the region of the spinal canal. It   does enhance around its rim. The fat on the T1-weighted sequences around   the region appears pristine. This collection may likely be a benign fluid   collection however, possibility of an infected collection is not entirely   excluded. Clinical correlation is necessary.    < end of copied text >  and Aspiration by IR today of 50 cc clear yellow fluid .  ID Dcd Abxs as  final c/s noted. Will check MRI LS spine if continues to have pain..     Problem/Plan - 5:  ·  Problem: Chronic Cough . .  Plan: CT chest noted. Pulmonary and ENT help appreciated. Pt already on PPI .CT sinuses noted.      Problem/Plan - 6:  Problem: Obesity. Plan: H/O Gastric Bypass and Bariatric surgery follow up noted.     Problem/Plan - 7:  ·  Problem: Fibromyalgia.  Plan: Home meds.

## 2019-12-09 NOTE — PROGRESS NOTE ADULT - SUBJECTIVE AND OBJECTIVE BOX
Follow-up Pulm Progress Note    No new respiratory events overnight.  Denies SOB/CP.   Cough is slowly improving   98% on RA    Medications:  MEDICATIONS  (STANDING):  benzocaine 15 mG/menthol 3.6 mG Lozenge 1 Lozenge Oral two times a day  benzonatate 100 milliGRAM(s) Oral three times a day  cefepime   IVPB 2000 milliGRAM(s) IV Intermittent every 12 hours  enoxaparin Injectable 40 milliGRAM(s) SubCutaneous daily  fluticasone propionate 50 MICROgram(s)/spray Nasal Spray 1 Spray(s) Both Nostrils two times a day  influenza   Vaccine 0.5 milliLiter(s) IntraMuscular once  lidocaine   Patch 1 Patch Transdermal daily  pantoprazole    Tablet 40 milliGRAM(s) Oral two times a day  polyethylene glycol 3350 17 Gram(s) Oral daily  pregabalin 150 milliGRAM(s) Oral every 8 hours  senna 2 Tablet(s) Oral at bedtime  sodium chloride 0.65% Nasal 1 Spray(s) Both Nostrils every 6 hours  sodium chloride 0.9%. 1000 milliLiter(s) (75 mL/Hr) IV Continuous <Continuous>  triamcinolone 0.1% Cream 1 Application(s) Topical three times a day  vancomycin  IVPB 1250 milliGRAM(s) IV Intermittent every 12 hours    MEDICATIONS  (PRN):  diphenhydrAMINE   Injectable 25 milliGRAM(s) IV Push every 12 hours PRN premedication  hydrocodone/homatropine Syrup 5 milliLiter(s) Oral every 6 hours PRN Cough  oxycodone    5 mG/acetaminophen 325 mG 2 Tablet(s) Oral every 6 hours PRN Moderate Pain (4 - 6)          Vital Signs Last 24 Hrs  T(C): 36.7 (09 Dec 2019 05:13), Max: 36.7 (09 Dec 2019 05:13)  T(F): 98 (09 Dec 2019 05:13), Max: 98 (09 Dec 2019 05:13)  HR: 77 (09 Dec 2019 05:13) (77 - 88)  BP: 123/66 (09 Dec 2019 05:13) (121/78 - 123/66)  BP(mean): --  RR: 18 (09 Dec 2019 05:13) (18 - 18)  SpO2: 96% (09 Dec 2019 05:13) (96% - 96%) on RA          12-08 @ 07:01  -  12-09 @ 07:00  --------------------------------------------------------  IN: 1540 mL / OUT: 0 mL / NET: 1540 mL          LABS:                        10.3   6.72  )-----------( 265      ( 08 Dec 2019 12:44 )             33.8     12-08    136  |  102  |  9   ----------------------------<  110<H>  4.4   |  23  |  0.71    Ca    8.5      08 Dec 2019 10:43            CAPILLARY BLOOD GLUCOSE                        Fluid characteristics  -- 12-04 @ 17:03  pH --  LDH --  tprot 3.8    Cell count  Appearance Slightly Cloudy  Fluid type --  BF lymph 88  color Yellow  eosinophil --  PMN 0  Mesothelial --  Monocyte 12  Other body cells --      CULTURES: (if applicable)  Culture Results:   No growth (12-04 @ 22:06)  Culture Results:   Testing in progress (12-04 @ 22:06)  Culture Results:   Normal Respiratory Jessica present (12-03 @ 09:33)    Most recent blood culture -- 12-04 @ 22:06   -- -- .Body Fluid Synovial Fluid 12-04 @ 22:06    Blood culture 12-04 @ 22:06  --    Rare polymorphonuclear leukocytes per low power field  No organisms seen  --  --  --    Urine culture    -->      Physical Examination:  PULM: Clear to auscultation bilaterally, no significant sputum production  CVS: S1, S2 heard    RADIOLOGY REVIEWED  CT chest: < from: CT Chest No Cont (11.28.19 @ 16:27) >    FINDINGS:    LUNGS AND AIRWAYS: The lungs are clear. Patent central airways.     PLEURA: No pleural effusion.    MEDIASTINUM AND EVELIA: No lymphadenopathy.    VESSELS: Within normal limits.    HEART: Heart size is normal. No pericardial effusion. Minimal LAD   calcific atherosclerosis.    CHEST WALL AND LOWER NECK: Bilateral breast implants.     VISUALIZED UPPER ABDOMEN: Status post gastric bypass. Punctate   nonobstructing left renal stone(series 2 image 160). 3.5 x 2.5 cm   incompletely imaged subcutaneous fluid collection with fistulous tract to   the skin surface within the mid lower back at the level of L1 vertebral   body. Recommend correlation with physical exam.    BONES: Within normal limits.    IMPRESSION:     Clear lungs.    3.5 x 2.5 cm incompletely imaged subcutaneous fluid collection with   fistulous tract to the skin surface within the mid lower back at the   level of L1 vertebral body. Recommend correlation with physical exam.    < end of copied text >

## 2019-12-09 NOTE — PROGRESS NOTE ADULT - ASSESSMENT
4yo F pmhx of fibromyalgia, anemia, gastric bypass in 2006 at [place], she was [facility] in [facility] with epigastric pain, found to have ulcer at the gastric staple line, managed with PPI, then discharged, presents here with one day of BRBPR.     Pt with intermittent low grade temp, Abnormal CT, concern for post op spinal/ soft tissue infection. Cough   S/p CT guided aspiration, cx NTD. Possible periodontal ds.       Plan:   S/p aspiration of fluid collection in the back, cx NTD  recommended to stop vanco and cefepime, start augmentin for periodontal infection.   ESR/CRP almost normal, blood cx NTD, all of this points towards non infectious collection, ? spinal leak.   s/p Neurosurgery eval, pt to follow up with her spinal surgeon as outpt.   if persistent lt sided back pain, plan for repeat MRI

## 2019-12-10 LAB
ANION GAP SERPL CALC-SCNC: 13 MMOL/L — SIGNIFICANT CHANGE UP (ref 5–17)
BUN SERPL-MCNC: 8 MG/DL — SIGNIFICANT CHANGE UP (ref 7–23)
CALCIUM SERPL-MCNC: 9 MG/DL — SIGNIFICANT CHANGE UP (ref 8.4–10.5)
CHLORIDE SERPL-SCNC: 102 MMOL/L — SIGNIFICANT CHANGE UP (ref 96–108)
CO2 SERPL-SCNC: 24 MMOL/L — SIGNIFICANT CHANGE UP (ref 22–31)
CREAT SERPL-MCNC: 0.52 MG/DL — SIGNIFICANT CHANGE UP (ref 0.5–1.3)
GLUCOSE SERPL-MCNC: 99 MG/DL — SIGNIFICANT CHANGE UP (ref 70–99)
HCT VFR BLD CALC: 32.7 % — LOW (ref 34.5–45)
HGB BLD-MCNC: 9.9 G/DL — LOW (ref 11.5–15.5)
MCHC RBC-ENTMCNC: 26.8 PG — LOW (ref 27–34)
MCHC RBC-ENTMCNC: 30.3 GM/DL — LOW (ref 32–36)
MCV RBC AUTO: 88.4 FL — SIGNIFICANT CHANGE UP (ref 80–100)
NRBC # BLD: 0 /100 WBCS — SIGNIFICANT CHANGE UP (ref 0–0)
PLATELET # BLD AUTO: 237 K/UL — SIGNIFICANT CHANGE UP (ref 150–400)
POTASSIUM SERPL-MCNC: 4 MMOL/L — SIGNIFICANT CHANGE UP (ref 3.5–5.3)
POTASSIUM SERPL-SCNC: 4 MMOL/L — SIGNIFICANT CHANGE UP (ref 3.5–5.3)
RBC # BLD: 3.7 M/UL — LOW (ref 3.8–5.2)
RBC # FLD: 22.9 % — HIGH (ref 10.3–14.5)
SODIUM SERPL-SCNC: 139 MMOL/L — SIGNIFICANT CHANGE UP (ref 135–145)
WBC # BLD: 5.52 K/UL — SIGNIFICANT CHANGE UP (ref 3.8–10.5)
WBC # FLD AUTO: 5.52 K/UL — SIGNIFICANT CHANGE UP (ref 3.8–10.5)

## 2019-12-10 PROCEDURE — 99232 SBSQ HOSP IP/OBS MODERATE 35: CPT

## 2019-12-10 RX ADMIN — Medication 1 SPRAY(S): at 22:53

## 2019-12-10 RX ADMIN — OXYCODONE AND ACETAMINOPHEN 2 TABLET(S): 5; 325 TABLET ORAL at 07:50

## 2019-12-10 RX ADMIN — OXYCODONE AND ACETAMINOPHEN 2 TABLET(S): 5; 325 TABLET ORAL at 14:07

## 2019-12-10 RX ADMIN — POLYETHYLENE GLYCOL 3350 17 GRAM(S): 17 POWDER, FOR SOLUTION ORAL at 11:02

## 2019-12-10 RX ADMIN — OXYCODONE AND ACETAMINOPHEN 2 TABLET(S): 5; 325 TABLET ORAL at 21:00

## 2019-12-10 RX ADMIN — SENNA PLUS 2 TABLET(S): 8.6 TABLET ORAL at 22:52

## 2019-12-10 RX ADMIN — Medication 1 SPRAY(S): at 17:51

## 2019-12-10 RX ADMIN — OXYCODONE AND ACETAMINOPHEN 2 TABLET(S): 5; 325 TABLET ORAL at 15:07

## 2019-12-10 RX ADMIN — OXYCODONE AND ACETAMINOPHEN 2 TABLET(S): 5; 325 TABLET ORAL at 01:35

## 2019-12-10 RX ADMIN — Medication 1 SPRAY(S): at 05:33

## 2019-12-10 RX ADMIN — LIDOCAINE 1 PATCH: 4 CREAM TOPICAL at 11:02

## 2019-12-10 RX ADMIN — Medication 1 APPLICATION(S): at 22:52

## 2019-12-10 RX ADMIN — Medication 1 APPLICATION(S): at 05:33

## 2019-12-10 RX ADMIN — Medication 150 MILLIGRAM(S): at 07:50

## 2019-12-10 RX ADMIN — Medication 1 TABLET(S): at 11:02

## 2019-12-10 RX ADMIN — PANTOPRAZOLE SODIUM 40 MILLIGRAM(S): 20 TABLET, DELAYED RELEASE ORAL at 05:32

## 2019-12-10 RX ADMIN — Medication 150 MILLIGRAM(S): at 16:12

## 2019-12-10 RX ADMIN — PANTOPRAZOLE SODIUM 40 MILLIGRAM(S): 20 TABLET, DELAYED RELEASE ORAL at 16:12

## 2019-12-10 RX ADMIN — LIDOCAINE 1 PATCH: 4 CREAM TOPICAL at 20:31

## 2019-12-10 RX ADMIN — Medication 1 TABLET(S): at 05:32

## 2019-12-10 RX ADMIN — BENZOCAINE AND MENTHOL 1 LOZENGE: 5; 1 LIQUID ORAL at 05:32

## 2019-12-10 RX ADMIN — BENZOCAINE AND MENTHOL 1 LOZENGE: 5; 1 LIQUID ORAL at 17:51

## 2019-12-10 RX ADMIN — OXYCODONE AND ACETAMINOPHEN 2 TABLET(S): 5; 325 TABLET ORAL at 20:30

## 2019-12-10 RX ADMIN — Medication 100 MILLIGRAM(S): at 22:52

## 2019-12-10 RX ADMIN — Medication 1 SPRAY(S): at 11:04

## 2019-12-10 RX ADMIN — Medication 100 MILLIGRAM(S): at 14:02

## 2019-12-10 RX ADMIN — LIDOCAINE 1 PATCH: 4 CREAM TOPICAL at 22:55

## 2019-12-10 RX ADMIN — Medication 100 MILLIGRAM(S): at 05:32

## 2019-12-10 RX ADMIN — Medication 1 APPLICATION(S): at 14:02

## 2019-12-10 RX ADMIN — OXYCODONE AND ACETAMINOPHEN 2 TABLET(S): 5; 325 TABLET ORAL at 08:50

## 2019-12-10 RX ADMIN — Medication 1 TABLET(S): at 17:51

## 2019-12-10 RX ADMIN — ENOXAPARIN SODIUM 40 MILLIGRAM(S): 100 INJECTION SUBCUTANEOUS at 11:02

## 2019-12-10 RX ADMIN — OXYCODONE AND ACETAMINOPHEN 2 TABLET(S): 5; 325 TABLET ORAL at 02:27

## 2019-12-10 NOTE — PROGRESS NOTE ADULT - ASSESSMENT
42yo F pmhx of fibromyalgia, anemia, gastric bypass in 2006 at [place], she was admitted to [place] [facility] in 2015 with epigastric pain, found to have ulcer at the gastric staple line, managed with PPI, then discharged, presents here with one day of BRBPR. No history of constipation, she had hemorrhoids when she was pregnant but resolved, no surgeries for hemorrhoids, no chronic anal pain. She reports that after the BRBPR yesterday 1x episode, later in day she had a vaginal bleeding (Believes this is period, right time and has been "bleeding heavily with periods for 4 months". Feeling weaker more sob with bleeding. Does not take motrin, does not smoke.      Problem/Plan - 1:  ·  Problem: GIB (gastrointestinal bleeding).  Plan: Hemodynamically stable. GI and Colorectal help appreciated. S/P EGD and Colonoscopy. HH stable.     Problem/Plan - 2:  ·  Problem: Anemia due to acute blood loss.  Plan: Transfusing to keep Hgb>9 G . IV iron and cbc stable.      Problem/Plan - 3:  ·  Problem:  Fever and chills .  Plan: RVP ,Chest Xray noted  and cultures noted.  No Leucocytosis . CT chest noted.     < from: CT Chest No Cont (11.28.19 @ 16:27) >  IMPRESSION:     Clear lungs.    3.5 x 2.5 cm incompletely imaged subcutaneous fluid collection with   fistulous tract to the skin surface within the mid lower back at the   level of L1 vertebral body. Recommend correlation with physical exam.    These findings were discussed NP Xavier by Dr. Boykin on 11/29/19 10:22   AM.    < end of copied text >  ID and Nsux Surgery helping.      Problem/Plan - 4:  ·  Problem: Back pain with radiculopathy with Fluid Collection .  Plan: H/O Surgeries and will f/u with her spinal surgeon.   Nsx consult noted. MRI < from: MR Lumbar Spine w/wo IV Cont (11.29.19 @ 23:49) >  Impression: There is a large fluid collection with the measurements given   above in the subcutaneous fat. The patient has undergone a laminectomy   between the prior CT of 8/28/2018 and the current evaluation at the L5-S1   level. That operation and associated imaging was not done in this   hospital. The collection described does not communicate or extend into   the paraspinal musculature or into the region of the spinal canal. It   does enhance around its rim. The fat on the T1-weighted sequences around   the region appears pristine. This collection may likely be a benign fluid   collection however, possibility of an infected collection is not entirely   excluded. Clinical correlation is necessary.    < end of copied text >  and Aspiration by IR today of 50 cc clear yellow fluid .  ID Dcd Abxs as  final c/s noted. Will check MRI LS spine and doppler legs as she  continues to have pain..     Problem/Plan - 5:  ·  Problem: Chronic Cough . .  Plan: CT chest noted. Pulmonary and ENT help appreciated. Pt already on PPI .CT sinuses noted.      Problem/Plan - 6:  Problem: Obesity. Plan: H/O Gastric Bypass and Bariatric surgery follow up noted.     Problem/Plan - 7:  ·  Problem: Fibromyalgia.  Plan: Home meds.

## 2019-12-10 NOTE — PROGRESS NOTE ADULT - SUBJECTIVE AND OBJECTIVE BOX
Follow-up Pulm Progress Note    No new respiratory events overnight.  Denies SOB/CP.   Cough slowly improving   98% on RA    Medications:  MEDICATIONS  (STANDING):  amoxicillin  875 milliGRAM(s)/clavulanate 1 Tablet(s) Oral every 12 hours  benzocaine 15 mG/menthol 3.6 mG Lozenge 1 Lozenge Oral two times a day  benzonatate 100 milliGRAM(s) Oral three times a day  enoxaparin Injectable 40 milliGRAM(s) SubCutaneous daily  fluticasone propionate 50 MICROgram(s)/spray Nasal Spray 1 Spray(s) Both Nostrils two times a day  influenza   Vaccine 0.5 milliLiter(s) IntraMuscular once  lactobacillus acidophilus 1 Tablet(s) Oral daily  lidocaine   Patch 1 Patch Transdermal daily  pantoprazole    Tablet 40 milliGRAM(s) Oral two times a day  polyethylene glycol 3350 17 Gram(s) Oral daily  pregabalin 150 milliGRAM(s) Oral every 8 hours  senna 2 Tablet(s) Oral at bedtime  sodium chloride 0.65% Nasal 1 Spray(s) Both Nostrils every 6 hours  sodium chloride 0.9%. 1000 milliLiter(s) (75 mL/Hr) IV Continuous <Continuous>  triamcinolone 0.1% Cream 1 Application(s) Topical three times a day    MEDICATIONS  (PRN):  diphenhydrAMINE   Injectable 25 milliGRAM(s) IV Push every 12 hours PRN premedication  hydrocodone/homatropine Syrup 5 milliLiter(s) Oral every 6 hours PRN Cough  oxycodone    5 mG/acetaminophen 325 mG 2 Tablet(s) Oral every 6 hours PRN Moderate Pain (4 - 6)          Vital Signs Last 24 Hrs  T(C): 36.9 (10 Dec 2019 12:02), Max: 36.9 (10 Dec 2019 12:02)  T(F): 98.5 (10 Dec 2019 12:02), Max: 98.5 (10 Dec 2019 12:02)  HR: 88 (10 Dec 2019 12:02) (74 - 88)  BP: 99/67 (10 Dec 2019 12:02) (99/67 - 125/77)  BP(mean): --  RR: 18 (10 Dec 2019 12:02) (18 - 18)  SpO2: 96% (10 Dec 2019 12:02) (96% - 98%) on RA          12-09 @ 07:01  -  12-10 @ 07:00  --------------------------------------------------------  IN: 1910 mL / OUT: 0 mL / NET: 1910 mL          LABS:                        9.9    5.52  )-----------( 237      ( 10 Dec 2019 10:06 )             32.7     12-10    139  |  102  |  8   ----------------------------<  99  4.0   |  24  |  0.52    Ca    9.0      10 Dec 2019 09:58            CAPILLARY BLOOD GLUCOSE                        Fluid characteristics  -- 12-04 @ 17:03  pH --  LDH --  tprot 3.8    Cell count  Appearance Slightly Cloudy  Fluid type --  BF lymph 88  color Yellow  eosinophil --  PMN 0  Mesothelial --  Monocyte 12  Other body cells --      CULTURES: (if applicable)  Culture Results:   No growth at 5 days (12-04 @ 22:06)  Culture Results:   Testing in progress (12-04 @ 22:06)          Physical Examination:  PULM: Clear to auscultation bilaterally, no significant sputum production  CVS: S1, S2 heard    RADIOLOGY REVIEWED  CXR:     CT chest:    TTE: Follow-up Pulm Progress Note    No new respiratory events overnight.  Denies SOB/CP.   Cough slowly improving   98% on RA    Medications:  MEDICATIONS  (STANDING):  amoxicillin  875 milliGRAM(s)/clavulanate 1 Tablet(s) Oral every 12 hours  benzocaine 15 mG/menthol 3.6 mG Lozenge 1 Lozenge Oral two times a day  benzonatate 100 milliGRAM(s) Oral three times a day  enoxaparin Injectable 40 milliGRAM(s) SubCutaneous daily  fluticasone propionate 50 MICROgram(s)/spray Nasal Spray 1 Spray(s) Both Nostrils two times a day  influenza   Vaccine 0.5 milliLiter(s) IntraMuscular once  lactobacillus acidophilus 1 Tablet(s) Oral daily  lidocaine   Patch 1 Patch Transdermal daily  pantoprazole    Tablet 40 milliGRAM(s) Oral two times a day  polyethylene glycol 3350 17 Gram(s) Oral daily  pregabalin 150 milliGRAM(s) Oral every 8 hours  senna 2 Tablet(s) Oral at bedtime  sodium chloride 0.65% Nasal 1 Spray(s) Both Nostrils every 6 hours  sodium chloride 0.9%. 1000 milliLiter(s) (75 mL/Hr) IV Continuous <Continuous>  triamcinolone 0.1% Cream 1 Application(s) Topical three times a day    MEDICATIONS  (PRN):  diphenhydrAMINE   Injectable 25 milliGRAM(s) IV Push every 12 hours PRN premedication  hydrocodone/homatropine Syrup 5 milliLiter(s) Oral every 6 hours PRN Cough  oxycodone    5 mG/acetaminophen 325 mG 2 Tablet(s) Oral every 6 hours PRN Moderate Pain (4 - 6)          Vital Signs Last 24 Hrs  T(C): 36.9 (10 Dec 2019 12:02), Max: 36.9 (10 Dec 2019 12:02)  T(F): 98.5 (10 Dec 2019 12:02), Max: 98.5 (10 Dec 2019 12:02)  HR: 88 (10 Dec 2019 12:02) (74 - 88)  BP: 99/67 (10 Dec 2019 12:02) (99/67 - 125/77)  BP(mean): --  RR: 18 (10 Dec 2019 12:02) (18 - 18)  SpO2: 96% (10 Dec 2019 12:02) (96% - 98%) on RA          12-09 @ 07:01  -  12-10 @ 07:00  --------------------------------------------------------  IN: 1910 mL / OUT: 0 mL / NET: 1910 mL          LABS:                        9.9    5.52  )-----------( 237      ( 10 Dec 2019 10:06 )             32.7     12-10    139  |  102  |  8   ----------------------------<  99  4.0   |  24  |  0.52    Ca    9.0      10 Dec 2019 09:58            CAPILLARY BLOOD GLUCOSE                        Fluid characteristics  -- 12-04 @ 17:03  pH --  LDH --  tprot 3.8    Cell count  Appearance Slightly Cloudy  Fluid type --  BF lymph 88  color Yellow  eosinophil --  PMN 0  Mesothelial --  Monocyte 12  Other body cells --      CULTURES: (if applicable)  Culture Results:   No growth at 5 days (12-04 @ 22:06)  Culture Results:   Testing in progress (12-04 @ 22:06)          Physical Examination:  PULM: Clear to auscultation bilaterally, no significant sputum production  CVS: S1, S2 heard    RADIOLOGY REVIEWED  CT chest: clear lungs

## 2019-12-10 NOTE — PROGRESS NOTE ADULT - SUBJECTIVE AND OBJECTIVE BOX
INTERVAL HPI/OVERNIGHT EVENTS: Lower back and left thigh hurting   Vital Signs Last 24 Hrs  T(C): 36.9 (10 Dec 2019 12:02), Max: 36.9 (10 Dec 2019 12:02)  T(F): 98.5 (10 Dec 2019 12:02), Max: 98.5 (10 Dec 2019 12:02)  HR: 88 (10 Dec 2019 12:02) (74 - 88)  BP: 99/67 (10 Dec 2019 12:02) (99/67 - 125/77)  BP(mean): --  RR: 18 (10 Dec 2019 12:02) (18 - 18)  SpO2: 96% (10 Dec 2019 12:02) (96% - 98%)  I&O's Summary    09 Dec 2019 07:01  -  10 Dec 2019 07:00  --------------------------------------------------------  IN: 1910 mL / OUT: 0 mL / NET: 1910 mL    10 Dec 2019 07:01  -  10 Dec 2019 18:46  --------------------------------------------------------  IN: 360 mL / OUT: 0 mL / NET: 360 mL      MEDICATIONS  (STANDING):  amoxicillin  875 milliGRAM(s)/clavulanate 1 Tablet(s) Oral every 12 hours  benzocaine 15 mG/menthol 3.6 mG Lozenge 1 Lozenge Oral two times a day  benzonatate 100 milliGRAM(s) Oral three times a day  enoxaparin Injectable 40 milliGRAM(s) SubCutaneous daily  fluticasone propionate 50 MICROgram(s)/spray Nasal Spray 1 Spray(s) Both Nostrils two times a day  influenza   Vaccine 0.5 milliLiter(s) IntraMuscular once  lactobacillus acidophilus 1 Tablet(s) Oral daily  lidocaine   Patch 1 Patch Transdermal daily  pantoprazole    Tablet 40 milliGRAM(s) Oral two times a day  polyethylene glycol 3350 17 Gram(s) Oral daily  senna 2 Tablet(s) Oral at bedtime  sodium chloride 0.65% Nasal 1 Spray(s) Both Nostrils every 6 hours  sodium chloride 0.9%. 1000 milliLiter(s) (75 mL/Hr) IV Continuous <Continuous>  triamcinolone 0.1% Cream 1 Application(s) Topical three times a day    MEDICATIONS  (PRN):  diphenhydrAMINE   Injectable 25 milliGRAM(s) IV Push every 12 hours PRN premedication  hydrocodone/homatropine Syrup 5 milliLiter(s) Oral every 6 hours PRN Cough  oxycodone    5 mG/acetaminophen 325 mG 2 Tablet(s) Oral every 6 hours PRN Moderate Pain (4 - 6)    LABS:                        9.9    5.52  )-----------( 237      ( 10 Dec 2019 10:06 )             32.7     12-10    139  |  102  |  8   ----------------------------<  99  4.0   |  24  |  0.52    Ca    9.0      10 Dec 2019 09:58          CAPILLARY BLOOD GLUCOSE              REVIEW OF SYSTEMS:  CONSTITUTIONAL: No fever, weight loss, or fatigue  EYES: No eye pain, visual disturbances, or discharge  ENMT:  No difficulty hearing, tinnitus, vertigo; No sinus or throat pain  NECK: No pain or stiffness  RESPIRATORY: No cough, wheezing, chills or hemoptysis; No shortness of breath  CARDIOVASCULAR: No chest pain, palpitations, dizziness, or leg swelling  GASTROINTESTINAL: No abdominal or epigastric pain. No nausea, vomiting, or hematemesis; No diarrhea or constipation. No melena or hematochezia.  GENITOURINARY: No dysuria, frequency, hematuria, or incontinence  NEUROLOGICAL: No headaches, memory loss, loss of strength, numbness, or tremors      Consultant(s) Notes Reviewed:  [x ] YES  [ ] NO    PHYSICAL EXAM:  GENERAL: NAD, well-groomed, well-developed,not in any distress ,  HEAD:  Atraumatic, Normocephalic  EYES: EOMI, PERRLA, conjunctiva and sclera clear  ENMT: No tonsillar erythema, exudates, or enlargement; Moist mucous membranes, Good dentition, No lesions  NECK: Supple, No JVD, Normal thyroid  NERVOUS SYSTEM:  Alert & Oriented X3, No new  focal deficit   CHEST/LUNG: Good air entry bilateral with no  rales, rhonchi, wheezing, or rubs  HEART: Regular rate and rhythm; No murmurs, rubs, or gallops  ABDOMEN: Soft, Nontender, Nondistended; Bowel sounds present  EXTREMITIES:  2+ Peripheral Pulses, No clubbing, cyanosis, or edema  SKIN: No rashes or lesions    Care Discussed with Consultants/Other Providers [ x] YES  [ ] NO

## 2019-12-10 NOTE — PROGRESS NOTE ADULT - SUBJECTIVE AND OBJECTIVE BOX
44y old  Female who presents with a chief complaint of bleeding per rectum (10 Dec 2019 12:18)      Interval history:  Afebrile, walking with PT, cough much improved more than 50%, not yellow. Lt sided back pain radiating down the thigh.       Allergies:   IV Contrast (Hives)  shellfish (Hives; Rash)      Antimicrobials:  amoxicillin  875 milliGRAM(s)/clavulanate 1 Tablet(s) Oral every 12 hours      REVIEW OF SYSTEMS:  No chest pain   No SOB  No N/V  No dysuria  No rash.       Vital Signs Last 24 Hrs  T(C): 36.9 (12-10-19 @ 12:02), Max: 36.9 (12-10-19 @ 12:02)  T(F): 98.5 (12-10-19 @ 12:02), Max: 98.5 (12-10-19 @ 12:02)  HR: 88 (12-10-19 @ 12:02) (74 - 88)  BP: 99/67 (12-10-19 @ 12:02) (99/67 - 125/77)  BP(mean): --  RR: 18 (12-10-19 @ 12:02) (18 - 18)  SpO2: 96% (12-10-19 @ 12:02) (96% - 98%)      PHYSICAL EXAM:  Patient in no acute distress. AAOX3. Walking with walker.   Cardiovascular: S1S2 normal.  Lungs: + air entry B/L lung fields.  Gastrointestinal: soft, nontender,  Extremities: no edema.  IV sites not inflamed.   no rash.                           9.9    5.52  )-----------( 237      ( 10 Dec 2019 10:06 )             32.7   12-10    139  |  102  |  8   ----------------------------<  99  4.0   |  24  |  0.52    Ca    9.0      10 Dec 2019 09:58      Culture - Body Fluid with Gram Stain (12.04.19 @ 22:06)    Gram Stain:   Rare polymorphonuclear leukocytes per low power field  No organisms seen    Specimen Source: .Body Fluid Synovial Fluid    Culture Results:   No growth at 5 days

## 2019-12-10 NOTE — PROGRESS NOTE ADULT - ASSESSMENT
4yo F pmhx of fibromyalgia, anemia, gastric bypass in 2006 at [place], she was admitted to [facility] in [facility] with epigastric pain, found to have ulcer at the gastric staple line, managed with PPI, then discharged, presents here with one day of BRBPR.     Pt with intermittent low grade temp, Abnormal CT, concern for post op spinal/ soft tissue infection. Cough   S/p CT guided aspiration, cx NTD. Possible periodontal ds.       Plan:   S/p aspiration of fluid collection in the back, cx NTD  C/w augmentin for periodontal infection.   ESR/CRP almost normal, blood cx NTD, all of this points towards non infectious collection, ? spinal leak.   s/p Neurosurgery eval, pt to follow up with her spinal surgeon as outpt.   Pt complains of persistent lt sided back pain radiating down the thigh, plan for repeat MRI today

## 2019-12-11 PROCEDURE — 93970 EXTREMITY STUDY: CPT | Mod: 26

## 2019-12-11 PROCEDURE — 72158 MRI LUMBAR SPINE W/O & W/DYE: CPT | Mod: 26

## 2019-12-11 RX ADMIN — Medication 1 SPRAY(S): at 06:08

## 2019-12-11 RX ADMIN — OXYCODONE AND ACETAMINOPHEN 2 TABLET(S): 5; 325 TABLET ORAL at 15:30

## 2019-12-11 RX ADMIN — OXYCODONE AND ACETAMINOPHEN 2 TABLET(S): 5; 325 TABLET ORAL at 02:54

## 2019-12-11 RX ADMIN — Medication 1200 MILLIGRAM(S): at 17:18

## 2019-12-11 RX ADMIN — Medication 1 TABLET(S): at 06:07

## 2019-12-11 RX ADMIN — Medication 1 SPRAY(S): at 17:19

## 2019-12-11 RX ADMIN — BENZOCAINE AND MENTHOL 1 LOZENGE: 5; 1 LIQUID ORAL at 17:18

## 2019-12-11 RX ADMIN — Medication 1 SPRAY(S): at 23:49

## 2019-12-11 RX ADMIN — Medication 150 MILLIGRAM(S): at 09:10

## 2019-12-11 RX ADMIN — LIDOCAINE 1 PATCH: 4 CREAM TOPICAL at 23:50

## 2019-12-11 RX ADMIN — OXYCODONE AND ACETAMINOPHEN 2 TABLET(S): 5; 325 TABLET ORAL at 09:05

## 2019-12-11 RX ADMIN — LIDOCAINE 1 PATCH: 4 CREAM TOPICAL at 12:23

## 2019-12-11 RX ADMIN — Medication 1 TABLET(S): at 12:23

## 2019-12-11 RX ADMIN — Medication 100 MILLIGRAM(S): at 06:07

## 2019-12-11 RX ADMIN — PANTOPRAZOLE SODIUM 40 MILLIGRAM(S): 20 TABLET, DELAYED RELEASE ORAL at 06:07

## 2019-12-11 RX ADMIN — ENOXAPARIN SODIUM 40 MILLIGRAM(S): 100 INJECTION SUBCUTANEOUS at 12:23

## 2019-12-11 RX ADMIN — PANTOPRAZOLE SODIUM 40 MILLIGRAM(S): 20 TABLET, DELAYED RELEASE ORAL at 17:19

## 2019-12-11 RX ADMIN — OXYCODONE AND ACETAMINOPHEN 2 TABLET(S): 5; 325 TABLET ORAL at 21:06

## 2019-12-11 RX ADMIN — OXYCODONE AND ACETAMINOPHEN 2 TABLET(S): 5; 325 TABLET ORAL at 03:27

## 2019-12-11 RX ADMIN — LIDOCAINE 1 PATCH: 4 CREAM TOPICAL at 19:44

## 2019-12-11 RX ADMIN — Medication 1 APPLICATION(S): at 14:15

## 2019-12-11 RX ADMIN — OXYCODONE AND ACETAMINOPHEN 2 TABLET(S): 5; 325 TABLET ORAL at 14:59

## 2019-12-11 RX ADMIN — Medication 1 APPLICATION(S): at 06:08

## 2019-12-11 RX ADMIN — Medication 1 SPRAY(S): at 12:23

## 2019-12-11 RX ADMIN — OXYCODONE AND ACETAMINOPHEN 2 TABLET(S): 5; 325 TABLET ORAL at 09:40

## 2019-12-11 RX ADMIN — Medication 1 APPLICATION(S): at 23:49

## 2019-12-11 RX ADMIN — Medication 150 MILLIGRAM(S): at 23:49

## 2019-12-11 RX ADMIN — BENZOCAINE AND MENTHOL 1 LOZENGE: 5; 1 LIQUID ORAL at 06:07

## 2019-12-11 RX ADMIN — OXYCODONE AND ACETAMINOPHEN 2 TABLET(S): 5; 325 TABLET ORAL at 21:40

## 2019-12-11 NOTE — PROGRESS NOTE ADULT - SUBJECTIVE AND OBJECTIVE BOX
Follow-up Pulm Progress Note    No new respiratory events overnight.  Denies SOB/CP.   States that cough was worse last night, productive with white-pale yellow phlegm   98% on RA      Medications:  MEDICATIONS  (STANDING):  amoxicillin  875 milliGRAM(s)/clavulanate 1 Tablet(s) Oral every 12 hours  benzocaine 15 mG/menthol 3.6 mG Lozenge 1 Lozenge Oral two times a day  enoxaparin Injectable 40 milliGRAM(s) SubCutaneous daily  guaiFENesin ER 1200 milliGRAM(s) Oral every 12 hours  influenza   Vaccine 0.5 milliLiter(s) IntraMuscular once  lactobacillus acidophilus 1 Tablet(s) Oral daily  lidocaine   Patch 1 Patch Transdermal daily  pantoprazole    Tablet 40 milliGRAM(s) Oral two times a day  polyethylene glycol 3350 17 Gram(s) Oral daily  pregabalin 150 milliGRAM(s) Oral every 8 hours  senna 2 Tablet(s) Oral at bedtime  sodium chloride 0.65% Nasal 1 Spray(s) Both Nostrils every 6 hours  sodium chloride 0.9%. 1000 milliLiter(s) (75 mL/Hr) IV Continuous <Continuous>  triamcinolone 0.1% Cream 1 Application(s) Topical three times a day    MEDICATIONS  (PRN):  diphenhydrAMINE   Injectable 25 milliGRAM(s) IV Push every 12 hours PRN premedication  hydrocodone/homatropine Syrup 5 milliLiter(s) Oral every 6 hours PRN Cough  oxycodone    5 mG/acetaminophen 325 mG 2 Tablet(s) Oral every 6 hours PRN Moderate Pain (4 - 6)          Vital Signs Last 24 Hrs  T(C): 36.7 (11 Dec 2019 04:14), Max: 36.9 (10 Dec 2019 20:52)  T(F): 98.1 (11 Dec 2019 04:14), Max: 98.4 (10 Dec 2019 20:52)  HR: 71 (11 Dec 2019 04:14) (71 - 77)  BP: 100/61 (11 Dec 2019 04:14) (100/61 - 103/67)  BP(mean): --  RR: 17 (11 Dec 2019 04:14) (17 - 18)  SpO2: 97% (11 Dec 2019 04:14) (97% - 97%) on RA          12-10 @ 07:01  -  12-11 @ 07:00  --------------------------------------------------------  IN: 1074 mL / OUT: 0 mL / NET: 1074 mL          LABS:                        9.9    5.52  )-----------( 237      ( 10 Dec 2019 10:06 )             32.7     12-10    139  |  102  |  8   ----------------------------<  99  4.0   |  24  |  0.52    Ca    9.0      10 Dec 2019 09:58            CAPILLARY BLOOD GLUCOSE                        Fluid characteristics  -- 12-04 @ 17:03  pH --  LDH --  tprot 3.8    Cell count  Appearance Slightly Cloudy  Fluid type --  BF lymph 88  color Yellow  eosinophil --  PMN 0  Mesothelial --  Monocyte 12  Other body cells --      CULTURES: (if applicable)  Culture Results:   No growth at 5 days (12-04 @ 22:06)  Culture Results:   Testing in progress (12-04 @ 22:06)          Physical Examination:  PULM: Clear to auscultation bilaterally, no significant sputum production  CVS: S1, S2 heard    RADIOLOGY REVIEWED  CT chest: < from: CT Chest No Cont (11.28.19 @ 16:27) >  FINDINGS:    LUNGS AND AIRWAYS: The lungs are clear. Patent central airways.     PLEURA: No pleural effusion.    MEDIASTINUM AND EVELIA: No lymphadenopathy.    VESSELS: Within normal limits.    HEART: Heart size is normal. No pericardial effusion. Minimal LAD   calcific atherosclerosis.    CHEST WALL AND LOWER NECK: Bilateral breast implants.     VISUALIZED UPPER ABDOMEN: Status post gastric bypass. Punctate   nonobstructing left renal stone(series 2 image 160). 3.5 x 2.5 cm   incompletely imaged subcutaneous fluid collection with fistulous tract to   the skin surface within the mid lower back at the level of L1 vertebral   body. Recommend correlation with physical exam.    BONES: Within normal limits.    IMPRESSION:     Clear lungs.    3.5 x 2.5 cm incompletely imaged subcutaneous fluid collection with   fistulous tract to the skin surface within the mid lower back at the   level of L1 vertebral body. Recommend correlation with physical exam.    < end of copied text >

## 2019-12-11 NOTE — PROGRESS NOTE ADULT - SUBJECTIVE AND OBJECTIVE BOX
INTERVAL HPI/OVERNIGHT EVENTS: i feel same . I walked .   Vital Signs Last 24 Hrs  T(C): 36.7 (11 Dec 2019 04:14), Max: 36.9 (10 Dec 2019 12:02)  T(F): 98.1 (11 Dec 2019 04:14), Max: 98.5 (10 Dec 2019 12:02)  HR: 71 (11 Dec 2019 04:14) (71 - 88)  BP: 100/61 (11 Dec 2019 04:14) (99/67 - 103/67)  BP(mean): --  RR: 17 (11 Dec 2019 04:14) (17 - 18)  SpO2: 97% (11 Dec 2019 04:14) (96% - 97%)  I&O's Summary    10 Dec 2019 07:01  -  11 Dec 2019 07:00  --------------------------------------------------------  IN: 1074 mL / OUT: 0 mL / NET: 1074 mL      MEDICATIONS  (STANDING):  amoxicillin  875 milliGRAM(s)/clavulanate 1 Tablet(s) Oral every 12 hours  benzocaine 15 mG/menthol 3.6 mG Lozenge 1 Lozenge Oral two times a day  benzonatate 100 milliGRAM(s) Oral three times a day  enoxaparin Injectable 40 milliGRAM(s) SubCutaneous daily  fluticasone propionate 50 MICROgram(s)/spray Nasal Spray 1 Spray(s) Both Nostrils two times a day  influenza   Vaccine 0.5 milliLiter(s) IntraMuscular once  lactobacillus acidophilus 1 Tablet(s) Oral daily  lidocaine   Patch 1 Patch Transdermal daily  pantoprazole    Tablet 40 milliGRAM(s) Oral two times a day  polyethylene glycol 3350 17 Gram(s) Oral daily  pregabalin 150 milliGRAM(s) Oral every 8 hours  senna 2 Tablet(s) Oral at bedtime  sodium chloride 0.65% Nasal 1 Spray(s) Both Nostrils every 6 hours  sodium chloride 0.9%. 1000 milliLiter(s) (75 mL/Hr) IV Continuous <Continuous>  triamcinolone 0.1% Cream 1 Application(s) Topical three times a day    MEDICATIONS  (PRN):  diphenhydrAMINE   Injectable 25 milliGRAM(s) IV Push every 12 hours PRN premedication  hydrocodone/homatropine Syrup 5 milliLiter(s) Oral every 6 hours PRN Cough  oxycodone    5 mG/acetaminophen 325 mG 2 Tablet(s) Oral every 6 hours PRN Moderate Pain (4 - 6)    LABS:                        9.9    5.52  )-----------( 237      ( 10 Dec 2019 10:06 )             32.7     12-10    139  |  102  |  8   ----------------------------<  99  4.0   |  24  |  0.52    Ca    9.0      10 Dec 2019 09:58          CAPILLARY BLOOD GLUCOSE              REVIEW OF SYSTEMS:  CONSTITUTIONAL: No fever, weight loss, or fatigue  EYES: No eye pain, visual disturbances, or discharge  ENMT:  No difficulty hearing, tinnitus, vertigo; No sinus or throat pain  RESPIRATORY: No cough, wheezing, chills or hemoptysis; No shortness of breath  CARDIOVASCULAR: No chest pain, palpitations, dizziness, or leg swelling  GASTROINTESTINAL: No abdominal or epigastric pain. No nausea, vomiting, or hematemesis; No diarrhea or constipation. No melena or hematochezia.  GENITOURINARY: No dysuria, frequency, hematuria, or incontinence  NEUROLOGICAL: No headaches, memory loss, loss of strength, numbness, or tremors    RADIOLOGY & ADDITIONAL TESTS:    Consultant(s) Notes Reviewed:  [x ] YES  [ ] NO    PHYSICAL EXAM:  GENERAL: NAD, well-groomed, well-developed,not in any distress ,  HEAD:  Atraumatic, Normocephalic  EYES: EOMI, PERRLA, conjunctiva and sclera clear  ENMT: No tonsillar erythema, exudates, or enlargement; Moist mucous membranes, Good dentition, No lesions  NECK: Supple, No JVD, Normal thyroid  NERVOUS SYSTEM:  Alert & Oriented X3, No new  focal deficit   CHEST/LUNG: Good air entry bilateral with no  rales, rhonchi, wheezing, or rubs  HEART: Regular rate and rhythm; No murmurs, rubs, or gallops  ABDOMEN: Soft, Nontender, Nondistended; Bowel sounds present  EXTREMITIES:  2+ Peripheral Pulses, No clubbing, cyanosis, or edema  SKIN: No rashes or lesions    Care Discussed with Consultants/Other Providers [ x] YES  [ ] NO

## 2019-12-11 NOTE — PROGRESS NOTE ADULT - ASSESSMENT
44yo F pmhx of fibromyalgia, anemia, gastric bypass in 2006 at [place], she was admitted to [facility] in 2015 with epigastric pain, found to have ulcer at the gastric staple line, managed with PPI, then discharged, presents here with one day of BRBPR. No history of constipation, she had hemorrhoids when she was pregnant but resolved, no surgeries for hemorrhoids, no chronic anal pain. She reports that after the BRBPR yesterday 1x episode, later in day she had a vaginal bleeding (Believes this is period, right time and has been "bleeding heavily with periods for 4 months". Feeling weaker more sob with bleeding. Does not take motrin, does not smoke.      Problem/Plan - 1:  ·  Problem: GIB (gastrointestinal bleeding).  Plan: Hemodynamically stable. GI and Colorectal help appreciated. S/P EGD and Colonoscopy. HH stable.     Problem/Plan - 2:  ·  Problem: Anemia due to acute blood loss.  Plan: Transfusing to keep Hgb>9 G . IV iron and cbc stable.      Problem/Plan - 3:  ·  Problem:  Fever and chills .  Plan: RVP ,Chest Xray noted  and cultures noted.  No Leucocytosis . CT chest noted.     < from: CT Chest No Cont (11.28.19 @ 16:27) >  IMPRESSION:     Clear lungs.    3.5 x 2.5 cm incompletely imaged subcutaneous fluid collection with   fistulous tract to the skin surface within the mid lower back at the   level of L1 vertebral body. Recommend correlation with physical exam.    These findings were discussed NP Xavier by Dr. Boykin on 11/29/19 10:22   AM.    < end of copied text >  ID and [facility] Surgery helping.      Problem/Plan - 4:  ·  Problem: Back pain with radiculopathy with Fluid Collection .  Plan: H/O Surgeries and will f/u with her spinal surgeon.   Nsx consult noted. MRI < from: MR Lumbar Spine w/wo IV Cont (11.29.19 @ 23:49) >  Impression: There is a large fluid collection with the measurements given   above in the subcutaneous fat. The patient has undergone a laminectomy   between the prior CT of 8/28/2018 and the current evaluation at the L5-S1   level. That operation and associated imaging was not done in this   hospital. The collection described does not communicate or extend into   the paraspinal musculature or into the region of the spinal canal. It   does enhance around its rim. The fat on the T1-weighted sequences around   the region appears pristine. This collection may likely be a benign fluid   collection however, possibility of an infected collection is not entirely   excluded. Clinical correlation is necessary.    < end of copied text >  and Aspiration by IR today of 50 cc clear yellow fluid .  ID Dcd Abxs as  final c/s noted. Will check MRI LS spine and doppler legs as she  continues to have pain lower bakc and left thigh .     Problem/Plan - 5:  ·  Problem: Chronic Cough . .  Plan: CT chest noted. Pulmonary and ENT help appreciated. Pt already on PPI .CT sinuses noted.      Problem/Plan - 6:  Problem: Obesity. Plan: H/O Gastric Bypass and Bariatric surgery follow up noted.     Problem/Plan - 7:  ·  Problem: Fibromyalgia.  Plan: Home meds.       Disposition : DC planning home pending MRI and Doppler legs .

## 2019-12-12 PROCEDURE — 99232 SBSQ HOSP IP/OBS MODERATE 35: CPT

## 2019-12-12 RX ADMIN — OXYCODONE AND ACETAMINOPHEN 2 TABLET(S): 5; 325 TABLET ORAL at 03:48

## 2019-12-12 RX ADMIN — Medication 1 TABLET(S): at 09:12

## 2019-12-12 RX ADMIN — Medication 1 SPRAY(S): at 18:21

## 2019-12-12 RX ADMIN — Medication 150 MILLIGRAM(S): at 21:10

## 2019-12-12 RX ADMIN — Medication 1200 MILLIGRAM(S): at 18:21

## 2019-12-12 RX ADMIN — OXYCODONE AND ACETAMINOPHEN 2 TABLET(S): 5; 325 TABLET ORAL at 21:11

## 2019-12-12 RX ADMIN — PANTOPRAZOLE SODIUM 40 MILLIGRAM(S): 20 TABLET, DELAYED RELEASE ORAL at 18:21

## 2019-12-12 RX ADMIN — LIDOCAINE 1 PATCH: 4 CREAM TOPICAL at 21:10

## 2019-12-12 RX ADMIN — Medication 1 APPLICATION(S): at 21:13

## 2019-12-12 RX ADMIN — OXYCODONE AND ACETAMINOPHEN 2 TABLET(S): 5; 325 TABLET ORAL at 10:00

## 2019-12-12 RX ADMIN — ENOXAPARIN SODIUM 40 MILLIGRAM(S): 100 INJECTION SUBCUTANEOUS at 09:12

## 2019-12-12 RX ADMIN — LIDOCAINE 1 PATCH: 4 CREAM TOPICAL at 09:12

## 2019-12-12 RX ADMIN — BENZOCAINE AND MENTHOL 1 LOZENGE: 5; 1 LIQUID ORAL at 06:02

## 2019-12-12 RX ADMIN — LIDOCAINE 1 PATCH: 4 CREAM TOPICAL at 18:22

## 2019-12-12 RX ADMIN — BENZOCAINE AND MENTHOL 1 LOZENGE: 5; 1 LIQUID ORAL at 18:21

## 2019-12-12 RX ADMIN — Medication 1 SPRAY(S): at 06:00

## 2019-12-12 RX ADMIN — OXYCODONE AND ACETAMINOPHEN 2 TABLET(S): 5; 325 TABLET ORAL at 16:00

## 2019-12-12 RX ADMIN — OXYCODONE AND ACETAMINOPHEN 2 TABLET(S): 5; 325 TABLET ORAL at 22:03

## 2019-12-12 RX ADMIN — OXYCODONE AND ACETAMINOPHEN 2 TABLET(S): 5; 325 TABLET ORAL at 09:10

## 2019-12-12 RX ADMIN — OXYCODONE AND ACETAMINOPHEN 2 TABLET(S): 5; 325 TABLET ORAL at 15:12

## 2019-12-12 RX ADMIN — Medication 150 MILLIGRAM(S): at 13:41

## 2019-12-12 RX ADMIN — Medication 1 APPLICATION(S): at 13:41

## 2019-12-12 RX ADMIN — OXYCODONE AND ACETAMINOPHEN 2 TABLET(S): 5; 325 TABLET ORAL at 03:03

## 2019-12-12 RX ADMIN — Medication 1 APPLICATION(S): at 06:01

## 2019-12-12 RX ADMIN — PANTOPRAZOLE SODIUM 40 MILLIGRAM(S): 20 TABLET, DELAYED RELEASE ORAL at 06:02

## 2019-12-12 RX ADMIN — Medication 1200 MILLIGRAM(S): at 06:02

## 2019-12-12 RX ADMIN — Medication 1 SPRAY(S): at 12:00

## 2019-12-12 RX ADMIN — Medication 150 MILLIGRAM(S): at 06:01

## 2019-12-12 NOTE — PROGRESS NOTE ADULT - SUBJECTIVE AND OBJECTIVE BOX
Follow-up Pulm Progress Note    Again states cough is worsening   +white phlegm   98% on RA    Medications:  MEDICATIONS  (STANDING):  benzocaine 15 mG/menthol 3.6 mG Lozenge 1 Lozenge Oral two times a day  enoxaparin Injectable 40 milliGRAM(s) SubCutaneous daily  guaiFENesin ER 1200 milliGRAM(s) Oral every 12 hours  influenza   Vaccine 0.5 milliLiter(s) IntraMuscular once  lactobacillus acidophilus 1 Tablet(s) Oral daily  lidocaine   Patch 1 Patch Transdermal daily  pantoprazole    Tablet 40 milliGRAM(s) Oral two times a day  pregabalin 150 milliGRAM(s) Oral every 8 hours  sodium chloride 0.65% Nasal 1 Spray(s) Both Nostrils every 6 hours  sodium chloride 0.9%. 1000 milliLiter(s) (75 mL/Hr) IV Continuous <Continuous>  triamcinolone 0.1% Cream 1 Application(s) Topical three times a day    MEDICATIONS  (PRN):  diphenhydrAMINE   Injectable 25 milliGRAM(s) IV Push every 12 hours PRN premedication  hydrocodone/homatropine Syrup 5 milliLiter(s) Oral every 6 hours PRN Cough  oxycodone    5 mG/acetaminophen 325 mG 2 Tablet(s) Oral every 6 hours PRN Moderate Pain (4 - 6)          Vital Signs Last 24 Hrs  T(C): 36.4 (12 Dec 2019 04:53), Max: 36.8 (11 Dec 2019 21:29)  T(F): 97.6 (12 Dec 2019 04:53), Max: 98.2 (11 Dec 2019 21:29)  HR: 75 (12 Dec 2019 04:53) (75 - 76)  BP: 98/66 (12 Dec 2019 04:53) (98/66 - 103/69)  BP(mean): --  RR: 18 (12 Dec 2019 04:53) (17 - 18)  SpO2: 96% (12 Dec 2019 04:53) (96% - 98%) on RA          12-11 @ 07:01  -  12-12 @ 07:00  --------------------------------------------------------  IN: 472 mL / OUT: 0 mL / NET: 472 mL          LABS:                CAPILLARY BLOOD GLUCOSE                        Fluid characteristics  -- 12-04 @ 17:03  pH --  LDH --  tprot 3.8    Cell count  Appearance Slightly Cloudy  Fluid type --  BF lymph 88  color Yellow  eosinophil --  PMN 0  Mesothelial --  Monocyte 12  Other body cells --      CULTURES: (if applicable)          Physical Examination:  PULM: Clear to auscultation bilaterally, no significant sputum production  CVS: S1, S2 heard    RADIOLOGY REVIEWED  CT chest: < from: CT Chest No Cont (11.28.19 @ 16:27) >  FINDINGS:    LUNGS AND AIRWAYS: The lungs are clear. Patent central airways.     PLEURA: No pleural effusion.    MEDIASTINUM AND EVELIA: No lymphadenopathy.    VESSELS: Within normal limits.    HEART: Heart size is normal. No pericardial effusion. Minimal LAD   calcific atherosclerosis.    CHEST WALL AND LOWER NECK: Bilateral breast implants.     VISUALIZED UPPER ABDOMEN: Status post gastric bypass. Punctate   nonobstructing left renal stone(series 2 image 160). 3.5 x 2.5 cm   incompletely imaged subcutaneous fluid collection with fistulous tract to   the skin surface within the mid lower back at the level of L1 vertebral   body. Recommend correlation with physical exam.    BONES: Within normal limits.    IMPRESSION:     Clear lungs.    3.5 x 2.5 cm incompletely imaged subcutaneous fluid collection with   fistulous tract to the skin surface within the mid lower back at the   level of L1 vertebral body. Recommend correlation with physical exam.    < end of copied text >

## 2019-12-12 NOTE — PROGRESS NOTE ADULT - ATTENDING COMMENTS
Shahida Rao  Pager: [phone]. If no response or past 5 pm call [phone].
Shahida Rao  Pager: [phone]. If no response or past 5 pm call [phone].    Please call ID service for questions over weekend at [phone].
Shahida Rao  Pager: [phone]. If no response or past 5 pm call [phone].
Shahida Rao  Pager: [phone]. If no response or past 5 pm call [phone].
Shahida Rao  Pager: [phone]. If no response or past 5 pm call [phone].
Shahida Rao  Pager: [phone]. If no response or past 5 pm call [phone].    Will sign off, please call with questions.
Shahida Rao  Pager: [phone]. If no response or past 5 pm call [phone].
I have evaluated the relevant clinical findings and plan with the surgical housestaff and/or fellow.  Agree with the above documentation with addenda as noted.     EGD and colonoscopy findings reviewed  Has non-bleeding marginal ulcers for which PPI is recommended  Colonoscopy with non-bleeding internal hemorrhoids  No acute surgical intervention is indicated  Patient should f/u as outpatient in several weeks' time    Missael Bosch MD
I have seen and evaluated the patient and discussed the relevant clinical findings and plan with the surgical housestaff and fellow.  Agree with the above documentation with addenda as noted.     43y F hx gastric bypass and prior marginal ulcer presenting with BRBPR and vaginal bleeding, also with c/o epigastric/chest pain.  Reports no dysphagia but has noted more discomfort when drinking carbonated beverages.  Reports no BMs or bleeding from below for 2 days.  Passing flatus, no nausea.  Denies smoking hx or use of NSAIDs.     On exam, vitals are normal  Abdomen is soft, nondistended, mildly tender to palpation in epigastrium without guarding or rebound    Labs reviewed -- rec'd 1 U pRBCs    Recommend upper endoscopy and colonoscopy  PPI  Will follow    Missael Bosch MD

## 2019-12-12 NOTE — PROGRESS NOTE ADULT - SUBJECTIVE AND OBJECTIVE BOX
44y old  Female who presents with a chief complaint of bleeding per rectum (12 Dec 2019 13:30)      Interval history:  Afebrile, back pain unchanged, cough same. No SOB.     Allergies:   IV Contrast (Hives)  shellfish (Hives; Rash)    Antimicrobials:      REVIEW OF SYSTEMS:  No chest pain   No N/V/D, no abdominal pain  No dysuria   No rash.     Vital Signs Last 24 Hrs  T(C): 36.6 (12-12-19 @ 13:57), Max: 36.8 (12-11-19 @ 21:29)  T(F): 97.8 (12-12-19 @ 13:57), Max: 98.2 (12-11-19 @ 21:29)  HR: 76 (12-12-19 @ 13:57) (75 - 76)  BP: 101/65 (12-12-19 @ 13:57) (98/66 - 103/69)  BP(mean): --  RR: 18 (12-12-19 @ 13:57) (17 - 18)  SpO2: 96% (12-12-19 @ 13:57) (96% - 98%)      PHYSICAL EXAM:  Patient in no acute distress. AAOX3. Laying in bed.   Cardiovascular: S1S2 normal.  Lungs: + air entry B/L lung fields.  Gastrointestinal: soft, nontender,  Extremities: no edema.  IV sites not inflamed.   no rash.         No new labs         Radiology:  < from: MR Lumbar Spine w/wo IV Cont (12.11.19 @ 23:36) >  IMPRESSION:  Slight decrease in size to the subcutaneous fluid collection   identified when compared with the prior 11/29/2019. No extension of the   collection into the spinal canal. Fusion hardware again recognized at the   L5-S1 level.      < from: [facility] Duplex Lower Ext Vein Scan, Bilat (12.11.19 @ 11:56) >  IMPRESSION:     No evidence of deep venous thrombosis in either lower extremity.

## 2019-12-12 NOTE — PROGRESS NOTE ADULT - ASSESSMENT
44yo F pmhx of fibromyalgia, anemia, gastric bypass in 2006 at [place], she was admitted to [place] surgery in 2015 with epigastric pain, found to have ulcer at the gastric staple line, managed with PPI, then discharged, presents here with one day of BRBPR. No history of constipation, she had hemorrhoids when she was pregnant but resolved, no surgeries for hemorrhoids, no chronic anal pain. She reports that after the BRBPR yesterday 1x episode, later in day she had a vaginal bleeding (Believes this is period, right time and has been "bleeding heavily with periods for 4 months". Feeling weaker more sob with bleeding. Does not take motrin, does not smoke.      Problem/Plan - 1:  ·  Problem: GIB (gastrointestinal bleeding).  Plan: Hemodynamically stable. GI and Colorectal help appreciated. S/P EGD and Colonoscopy. HH stable.     Problem/Plan - 2:  ·  Problem: Anemia due to acute blood loss.  Plan: Transfusing to keep Hgb>9 G . IV iron and cbc stable.      Problem/Plan - 3:  ·  Problem:  Fever and chills .  Plan: RVP ,Chest Xray noted  and cultures noted.  No Leucocytosis . CT chest noted.     < from: CT Chest No Cont (11.28.19 @ 16:27) >  IMPRESSION:     Clear lungs.    3.5 x 2.5 cm incompletely imaged subcutaneous fluid collection with   fistulous tract to the skin surface within the mid lower back at the   level of L1 vertebral body. Recommend correlation with physical exam.    These findings were discussed NP Xavier by Dr. Boykin on 11/29/19 10:22   AM.    < end of copied text >  ID and Nsux Surgery helping.      Problem/Plan - 4:  ·  Problem: Back pain with radiculopathy with Fluid Collection .  Plan: H/O Surgeries and will f/u with her spinal surgeon.   Nsx consult noted. MRI < from: MR Lumbar Spine w/wo IV Cont (11.29.19 @ 23:49) >  Impression: There is a large fluid collection with the measurements given   above in the subcutaneous fat. The patient has undergone a laminectomy   between the prior CT of 8/28/2018 and the current evaluation at the L5-S1   level. That operation and associated imaging was not done in this   hospital. The collection described does not communicate or extend into   the paraspinal musculature or into the region of the spinal canal. It   does enhance around its rim. The fat on the T1-weighted sequences around   the region appears pristine. This collection may likely be a benign fluid   collection however, possibility of an infected collection is not entirely   excluded. Clinical correlation is necessary.    < end of copied text >  and Aspiration by IR today of 50 cc clear yellow fluid .  ID Dcd Abxs as  final c/s noted.  MRI LS spine decrease in size of collection and doppler legs negative . No fever or Leucocytosis .      Problem/Plan - 5:  ·  Problem: Chronic Cough . .  Plan: CT chest noted. Pulmonary and ENT help appreciated. Pt already on PPI .CT sinuses noted.      Problem/Plan - 6:  Problem: Obesity. Plan: H/O Gastric Bypass and Bariatric surgery follow up noted.     Problem/Plan - 7:  ·  Problem: Fibromyalgia.  Plan: Home meds.       Disposition : DC planning home .

## 2019-12-12 NOTE — PROGRESS NOTE ADULT - ASSESSMENT
2yo F pmhx of fibromyalgia, anemia, gastric bypass in 2006 at [facility], she was [facility] to [facility] in [facility] with epigastric pain, found to have ulcer at the gastric staple line, managed with PPI, then discharged, presents here with one day of BRBPR.     Pt with intermittent low grade temp, Abnormal CT, concern for post op spinal/ soft tissue infection. Cough   S/p CT guided aspiration, cx NTD. Possible periodontal ds.       Plan:   S/p aspiration of fluid collection in the back, cx NTD  Repeat MRI with not much change, fluid collection persists, pt advised to follow up with her surgeon as outpt.   s/p 7 days of therapy for possible sinus/periodontal infection.   ESR/CRP almost normal, blood cx NTD, all of this points towards non infectious collection, ? spinal leak.   s/p Neurosurgery eval, pt to follow up with her spinal surgeon as outpt.

## 2019-12-12 NOTE — PROGRESS NOTE ADULT - SUBJECTIVE AND OBJECTIVE BOX
INTERVAL HPI/OVERNIGHT EVENTS:  Vital Signs Last 24 Hrs  T(C): 36.4 (12 Dec 2019 04:53), Max: 36.8 (11 Dec 2019 21:29)  T(F): 97.6 (12 Dec 2019 04:53), Max: 98.2 (11 Dec 2019 21:29)  HR: 75 (12 Dec 2019 04:53) (75 - 76)  BP: 98/66 (12 Dec 2019 04:53) (98/66 - 103/69)  BP(mean): --  RR: 18 (12 Dec 2019 04:53) (17 - 18)  SpO2: 96% (12 Dec 2019 04:53) (96% - 98%)  I&O's Summary    11 Dec 2019 07:01  -  12 Dec 2019 07:00  --------------------------------------------------------  IN: 472 mL / OUT: 0 mL / NET: 472 mL      MEDICATIONS  (STANDING):  benzocaine 15 mG/menthol 3.6 mG Lozenge 1 Lozenge Oral two times a day  enoxaparin Injectable 40 milliGRAM(s) SubCutaneous daily  guaiFENesin ER 1200 milliGRAM(s) Oral every 12 hours  influenza   Vaccine 0.5 milliLiter(s) IntraMuscular once  lactobacillus acidophilus 1 Tablet(s) Oral daily  lidocaine   Patch 1 Patch Transdermal daily  pantoprazole    Tablet 40 milliGRAM(s) Oral two times a day  pregabalin 150 milliGRAM(s) Oral every 8 hours  sodium chloride 0.65% Nasal 1 Spray(s) Both Nostrils every 6 hours  sodium chloride 0.9%. 1000 milliLiter(s) (75 mL/Hr) IV Continuous <Continuous>  triamcinolone 0.1% Cream 1 Application(s) Topical three times a day    MEDICATIONS  (PRN):  diphenhydrAMINE   Injectable 25 milliGRAM(s) IV Push every 12 hours PRN premedication  hydrocodone/homatropine Syrup 5 milliLiter(s) Oral every 6 hours PRN Cough  oxycodone    5 mG/acetaminophen 325 mG 2 Tablet(s) Oral every 6 hours PRN Moderate Pain (4 - 6)    LABS:              CAPILLARY BLOOD GLUCOSE              REVIEW OF SYSTEMS:  CONSTITUTIONAL: No fever, weight loss, or fatigue  EYES: No eye pain, visual disturbances, or discharge  ENMT:  No difficulty hearing, tinnitus, vertigo; No sinus or throat pain  NECK: No pain or stiffness  RESPIRATORY: No cough, wheezing, chills or hemoptysis; No shortness of breath  CARDIOVASCULAR: No chest pain, palpitations, dizziness, or leg swelling  GASTROINTESTINAL: No abdominal or epigastric pain. No nausea, vomiting, or hematemesis; No diarrhea or constipation. No melena or hematochezia.  GENITOURINARY: No dysuria, frequency, hematuria, or incontinence  NEUROLOGICAL: No headaches, memory loss, loss of strength, numbness, or tremors  MUSCULOSKELETAL: left lower  extremity pain      RADIOLOGY & ADDITIONAL TESTS:    Consultant(s) Notes Reviewed:  [x ] YES  [ ] NO    PHYSICAL EXAM:  GENERAL: NAD, well-groomed, well-developed,not in any distress ,  HEAD:  Atraumatic, Normocephalic  EYES: EOMI, PERRLA, conjunctiva and sclera clear  ENMT: No tonsillar erythema, exudates, or enlargement; Moist mucous membranes, Good dentition, No lesions  NECK: Supple, No JVD, Normal thyroid  NERVOUS SYSTEM:  Alert & Oriented X3, No focal deficit   CHEST/LUNG: Good air entry bilateral with no  rales, rhonchi, wheezing, or rubs  HEART: Regular rate and rhythm; No murmurs, rubs, or gallops  ABDOMEN: Soft, Nontender, Nondistended; Bowel sounds present  EXTREMITIES:  2+ Peripheral Pulses, No clubbing, cyanosis, or edema      Care Discussed with Consultants/Other Providers [ x] YES  [ ] NO

## 2019-12-13 ENCOUNTER — TRANSCRIPTION ENCOUNTER (OUTPATIENT)
Age: 44
End: 2019-12-13

## 2019-12-13 VITALS
SYSTOLIC BLOOD PRESSURE: 139 MMHG | OXYGEN SATURATION: 96 % | HEART RATE: 79 BPM | TEMPERATURE: 98 F | RESPIRATION RATE: 18 BRPM | DIASTOLIC BLOOD PRESSURE: 84 MMHG

## 2019-12-13 PROCEDURE — 87205 SMEAR GRAM STAIN: CPT

## 2019-12-13 PROCEDURE — 82947 ASSAY GLUCOSE BLOOD QUANT: CPT

## 2019-12-13 PROCEDURE — 86901 BLOOD TYPING SEROLOGIC RH(D): CPT

## 2019-12-13 PROCEDURE — 81001 URINALYSIS AUTO W/SCOPE: CPT

## 2019-12-13 PROCEDURE — 89051 BODY FLUID CELL COUNT: CPT

## 2019-12-13 PROCEDURE — 82803 BLOOD GASES ANY COMBINATION: CPT

## 2019-12-13 PROCEDURE — 82728 ASSAY OF FERRITIN: CPT

## 2019-12-13 PROCEDURE — 87040 BLOOD CULTURE FOR BACTERIA: CPT

## 2019-12-13 PROCEDURE — 71250 CT THORAX DX C-: CPT

## 2019-12-13 PROCEDURE — 83605 ASSAY OF LACTIC ACID: CPT

## 2019-12-13 PROCEDURE — 90686 IIV4 VACC NO PRSV 0.5 ML IM: CPT

## 2019-12-13 PROCEDURE — 82945 GLUCOSE OTHER FLUID: CPT

## 2019-12-13 PROCEDURE — 94640 AIRWAY INHALATION TREATMENT: CPT

## 2019-12-13 PROCEDURE — 80048 BASIC METABOLIC PNL TOTAL CA: CPT

## 2019-12-13 PROCEDURE — 84100 ASSAY OF PHOSPHORUS: CPT

## 2019-12-13 PROCEDURE — 86900 BLOOD TYPING SEROLOGIC ABO: CPT

## 2019-12-13 PROCEDURE — 80053 COMPREHEN METABOLIC PANEL: CPT

## 2019-12-13 PROCEDURE — 82435 ASSAY OF BLOOD CHLORIDE: CPT

## 2019-12-13 PROCEDURE — 82550 ASSAY OF CK (CPK): CPT

## 2019-12-13 PROCEDURE — 72157 MRI CHEST SPINE W/O & W/DYE: CPT

## 2019-12-13 PROCEDURE — A9585: CPT

## 2019-12-13 PROCEDURE — 85610 PROTHROMBIN TIME: CPT

## 2019-12-13 PROCEDURE — 85014 HEMATOCRIT: CPT

## 2019-12-13 PROCEDURE — 85027 COMPLETE CBC AUTOMATED: CPT

## 2019-12-13 PROCEDURE — 87633 RESP VIRUS 12-25 TARGETS: CPT

## 2019-12-13 PROCEDURE — 87581 M.PNEUMON DNA AMP PROBE: CPT

## 2019-12-13 PROCEDURE — 93970 EXTREMITY STUDY: CPT

## 2019-12-13 PROCEDURE — 87102 FUNGUS ISOLATION CULTURE: CPT

## 2019-12-13 PROCEDURE — 83550 IRON BINDING TEST: CPT

## 2019-12-13 PROCEDURE — 82272 OCCULT BLD FECES 1-3 TESTS: CPT

## 2019-12-13 PROCEDURE — 97116 GAIT TRAINING THERAPY: CPT

## 2019-12-13 PROCEDURE — 97162 PT EVAL MOD COMPLEX 30 MIN: CPT

## 2019-12-13 PROCEDURE — 71046 X-RAY EXAM CHEST 2 VIEWS: CPT

## 2019-12-13 PROCEDURE — 87070 CULTURE OTHR SPECIMN AEROBIC: CPT

## 2019-12-13 PROCEDURE — 82746 ASSAY OF FOLIC ACID SERUM: CPT

## 2019-12-13 PROCEDURE — 93005 ELECTROCARDIOGRAM TRACING: CPT

## 2019-12-13 PROCEDURE — 84702 CHORIONIC GONADOTROPIN TEST: CPT

## 2019-12-13 PROCEDURE — 86850 RBC ANTIBODY SCREEN: CPT

## 2019-12-13 PROCEDURE — 85652 RBC SED RATE AUTOMATED: CPT

## 2019-12-13 PROCEDURE — 84484 ASSAY OF TROPONIN QUANT: CPT

## 2019-12-13 PROCEDURE — 84295 ASSAY OF SERUM SODIUM: CPT

## 2019-12-13 PROCEDURE — 86140 C-REACTIVE PROTEIN: CPT

## 2019-12-13 PROCEDURE — 82330 ASSAY OF CALCIUM: CPT

## 2019-12-13 PROCEDURE — 72158 MRI LUMBAR SPINE W/O & W/DYE: CPT

## 2019-12-13 PROCEDURE — 87075 CULTR BACTERIA EXCEPT BLOOD: CPT

## 2019-12-13 PROCEDURE — P9016: CPT

## 2019-12-13 PROCEDURE — 82607 VITAMIN B-12: CPT

## 2019-12-13 PROCEDURE — 80202 ASSAY OF VANCOMYCIN: CPT

## 2019-12-13 PROCEDURE — 84157 ASSAY OF PROTEIN OTHER: CPT

## 2019-12-13 PROCEDURE — 10009 FNA BX W/CT GDN 1ST LES: CPT

## 2019-12-13 PROCEDURE — 88312 SPECIAL STAINS GROUP 1: CPT

## 2019-12-13 PROCEDURE — 89060 EXAM SYNOVIAL FLUID CRYSTALS: CPT

## 2019-12-13 PROCEDURE — 88305 TISSUE EXAM BY PATHOLOGIST: CPT

## 2019-12-13 PROCEDURE — 83540 ASSAY OF IRON: CPT

## 2019-12-13 PROCEDURE — 99285 EMERGENCY DEPT VISIT HI MDM: CPT | Mod: 25

## 2019-12-13 PROCEDURE — 87798 DETECT AGENT NOS DNA AMP: CPT

## 2019-12-13 PROCEDURE — 87486 CHLMYD PNEUM DNA AMP PROBE: CPT

## 2019-12-13 PROCEDURE — 85730 THROMBOPLASTIN TIME PARTIAL: CPT

## 2019-12-13 PROCEDURE — 82565 ASSAY OF CREATININE: CPT

## 2019-12-13 PROCEDURE — 84132 ASSAY OF SERUM POTASSIUM: CPT

## 2019-12-13 PROCEDURE — 97530 THERAPEUTIC ACTIVITIES: CPT

## 2019-12-13 PROCEDURE — 70486 CT MAXILLOFACIAL W/O DYE: CPT

## 2019-12-13 PROCEDURE — 86923 COMPATIBILITY TEST ELECTRIC: CPT

## 2019-12-13 PROCEDURE — 83735 ASSAY OF MAGNESIUM: CPT

## 2019-12-13 PROCEDURE — 87389 HIV-1 AG W/HIV-1&-2 AB AG IA: CPT

## 2019-12-13 PROCEDURE — 82553 CREATINE MB FRACTION: CPT

## 2019-12-13 RX ORDER — PANTOPRAZOLE SODIUM 20 MG/1
1 TABLET, DELAYED RELEASE ORAL
Qty: 60 | Refills: 0
Start: 2019-12-13 | End: 2020-01-11

## 2019-12-13 RX ORDER — LIDOCAINE 4 G/100G
1 CREAM TOPICAL
Qty: 0 | Refills: 0 | DISCHARGE

## 2019-12-13 RX ORDER — IBUPROFEN 200 MG
1 TABLET ORAL
Qty: 0 | Refills: 0 | DISCHARGE

## 2019-12-13 RX ORDER — SODIUM CHLORIDE 0.65 %
1 AEROSOL, SPRAY (ML) NASAL
Qty: 1 | Refills: 0
Start: 2019-12-13 | End: 2020-01-11

## 2019-12-13 RX ORDER — CYCLOBENZAPRINE HYDROCHLORIDE 10 MG/1
1 TABLET, FILM COATED ORAL
Qty: 0 | Refills: 0 | DISCHARGE

## 2019-12-13 RX ORDER — LACTOBACILLUS ACIDOPHILUS 100MM CELL
1 CAPSULE ORAL
Qty: 30 | Refills: 0
Start: 2019-12-13 | End: 2020-01-11

## 2019-12-13 RX ADMIN — OXYCODONE AND ACETAMINOPHEN 2 TABLET(S): 5; 325 TABLET ORAL at 05:05

## 2019-12-13 RX ADMIN — Medication 1200 MILLIGRAM(S): at 05:04

## 2019-12-13 RX ADMIN — Medication 1200 MILLIGRAM(S): at 17:09

## 2019-12-13 RX ADMIN — Medication 1 TABLET(S): at 12:01

## 2019-12-13 RX ADMIN — ENOXAPARIN SODIUM 40 MILLIGRAM(S): 100 INJECTION SUBCUTANEOUS at 12:01

## 2019-12-13 RX ADMIN — Medication 150 MILLIGRAM(S): at 13:55

## 2019-12-13 RX ADMIN — INFLUENZA VIRUS VACCINE 0.5 MILLILITER(S): 15; 15; 15; 15 SUSPENSION INTRAMUSCULAR at 17:27

## 2019-12-13 RX ADMIN — Medication 1 SPRAY(S): at 17:10

## 2019-12-13 RX ADMIN — BENZOCAINE AND MENTHOL 1 LOZENGE: 5; 1 LIQUID ORAL at 05:05

## 2019-12-13 RX ADMIN — LIDOCAINE 1 PATCH: 4 CREAM TOPICAL at 17:13

## 2019-12-13 RX ADMIN — BENZOCAINE AND MENTHOL 1 LOZENGE: 5; 1 LIQUID ORAL at 17:11

## 2019-12-13 RX ADMIN — OXYCODONE AND ACETAMINOPHEN 2 TABLET(S): 5; 325 TABLET ORAL at 17:40

## 2019-12-13 RX ADMIN — Medication 1 APPLICATION(S): at 13:55

## 2019-12-13 RX ADMIN — PANTOPRAZOLE SODIUM 40 MILLIGRAM(S): 20 TABLET, DELAYED RELEASE ORAL at 17:09

## 2019-12-13 RX ADMIN — Medication 150 MILLIGRAM(S): at 05:05

## 2019-12-13 RX ADMIN — Medication 1 SPRAY(S): at 05:09

## 2019-12-13 RX ADMIN — OXYCODONE AND ACETAMINOPHEN 2 TABLET(S): 5; 325 TABLET ORAL at 17:10

## 2019-12-13 RX ADMIN — Medication 1 SPRAY(S): at 12:02

## 2019-12-13 RX ADMIN — OXYCODONE AND ACETAMINOPHEN 2 TABLET(S): 5; 325 TABLET ORAL at 10:54

## 2019-12-13 RX ADMIN — OXYCODONE AND ACETAMINOPHEN 2 TABLET(S): 5; 325 TABLET ORAL at 12:04

## 2019-12-13 RX ADMIN — PANTOPRAZOLE SODIUM 40 MILLIGRAM(S): 20 TABLET, DELAYED RELEASE ORAL at 05:04

## 2019-12-13 RX ADMIN — OXYCODONE AND ACETAMINOPHEN 2 TABLET(S): 5; 325 TABLET ORAL at 06:09

## 2019-12-13 RX ADMIN — Medication 1 APPLICATION(S): at 05:07

## 2019-12-13 NOTE — DISCHARGE NOTE PROVIDER - PROVIDER TOKENS
FREE:[LAST:[Dr alvarado [facility] [place]],PHONE:[(   )    -],FAX:[(   )    -]],FREE:[LAST:[Dr Bird],FIRST:[[facility]],PHONE:[(   )    -],FAX:[(   )    -]],FREE:[LAST:[GYN],PHONE:[(   )    -],FAX:[(   )    -]],FREE:[LAST:[Dentist],PHONE:[(   )    -],FAX:[(   )    -]],PROVIDER:[TOKEN:[[id number]]]

## 2019-12-13 NOTE — PROGRESS NOTE ADULT - NSHPATTENDINGPLANDISCUSS_GEN_ALL_CORE
the primary team
the primary team
Neuroradiology, medicine np
medicine attending, np, pharmacy
medicine np
medicine np
medicine np and attending
np
pt and NP
pt and NP

## 2019-12-13 NOTE — PROGRESS NOTE ADULT - PROBLEM SELECTOR PLAN 1
2nd GERD vs post-viral cough  -Cough has been worse the last few days after being better last week. Still white phlegm. ?GERD ?inflammation/post viral (though she is beyond the timeline that this should continue to be an issue). Defer steroid use for now but will consider as outpt if cough fails to improve  -She is sleeping at least at 30 degree angle and not eating several hours before sleep, using GERD reduction strategies.   -Consider outpt PFT's   -Hycodan q6 PRN  -Cepacol PRN  -c/w Protonix 40mg BID  -CT chest clear  -No history of asthma   -c/w Nasal saline q6  -Mucinex BID  -Outpt f/u with pulm if cough is nonresolving. Please discharge with 1 week Rx for Hycodan
2nd GERD vs post-viral cough  -Cough is worse again after being better last week. Still white phlegm. ?GERD ?inflammation/post viral (though she is beyond the timeline that this should continue to be an issue). Defer steroid use for now but will consider as outpt if cough fails to improve  -She is sleeping at least at 30 degree angle and not eating several hours before sleep, using GERD reduction strategies.   -Consider outpt PFT's   -Hycodan q6 PRN  -Cepacol PRN  -c/w Protonix 40mg BID  -CT chest clear  -No history of asthma   -c/w Nasal saline q6  -Mucinex BID  -Outpt f/u with pulm if cough is nonresolving. Please discharge with 1 week Rx for Hycodan
2nd GERD vs post-viral cough  -ENT eval noted and appreciated  -c/w Protonix 40mg BID  -CT chest clear  -No history of asthma   -c/w Flonase, nasal saline  -Tessalon TID PRN.
2nd GERD vs post-viral cough  -Repeat RVP negative   -Hycodan q6 PRN  -Cepacol PRN  -ENT eval noted and appreciated, no evidence of bacterial sinusitis   -c/w Protonix 40mg BID  -CT chest clear  -No history of asthma   -c/w Flonase, nasal saline  -Tessalon TID PRN
2nd GERD vs post-viral cough  -Repeat RVP negative   -Hycodan q6 PRN  -Cepacol PRN  -ENT eval noted and appreciated, no evidence of bacterial sinusitis   -c/w Protonix 40mg BID  -CT chest clear  -No history of asthma   -c/w Flonase, nasal saline  -Tessalon TID PRN
2nd GERD vs post-viral cough  -Repeat RVP negative   -Hycodan q6 PRN  -Cepacol PRN  -c/w Protonix 40mg BID  -CT chest clear  -No history of asthma   -c/w Nasal saline q6  -Mucinex BID
2nd GERD vs post-viral cough  -Repeat RVP negative   -Hycodan q6 PRN  -f/u sinus CT  -Cepacol PRN  -Pharynx appears erythematous but no exudates noted   -ENT eval noted and appreciated, no evidence of bacterial sinusitis   -c/w Protonix 40mg BID  -CT chest clear  -No history of asthma   -c/w Flonase, nasal saline  -Tessalon TID PRN
2nd GERD vs post-viral cough  -Repeat RVP negative   -Hycodan q6 PRN  -f/u sinus CT  -Cepacol PRN  -Pharynx appears erythematous but no exudates noted   -ENT eval noted and appreciated, no evidence of bacterial sinusitis   -c/w Protonix 40mg BID  -CT chest clear  -No history of asthma   -c/w Flonase, nasal saline  -Tessalon TID PRN
2nd GERD vs post-viral cough  -Worsened cough today, please re-check RVP  -Hycodan qHS  -Cepacol PRN  -Pharynx appears erythematous but no exudates noted   -ENT eval noted and appreciated, no evidence of bacterial sinusitis   -c/w Protonix 40mg BID  -CT chest clear  -No history of asthma   -c/w Flonase, nasal saline  -Tessalon TID PRN
2nd GERD vs post-viral cough  -Repeat RVP negative   -Hycodan q6 PRN  -Cepacol PRN  -c/w Protonix 40mg BID  -CT chest clear  -No history of asthma   -c/w Flonase, nasal saline  -Tessalon TID PRN

## 2019-12-13 NOTE — CHART NOTE - NSCHARTNOTEFT_GEN_A_CORE
MRI reviewed. shows decreased in subcutaneous lumbar fluid collection  -Patient may follow up outpatient with her primary spine surgeon at [facility]

## 2019-12-13 NOTE — PROGRESS NOTE ADULT - SUBJECTIVE AND OBJECTIVE BOX
INTERVAL HPI/OVERNIGHT EVENTS: I just came back walking . Feel better .   Vital Signs Last 24 Hrs  T(C): 36.8 (13 Dec 2019 11:26), Max: 36.8 (12 Dec 2019 21:19)  T(F): 98.3 (13 Dec 2019 11:26), Max: 98.3 (13 Dec 2019 11:26)  HR: 83 (13 Dec 2019 11:26) (70 - 84)  BP: 115/70 (13 Dec 2019 11:26) (98/64 - 115/70)  BP(mean): --  RR: 18 (13 Dec 2019 11:26) (18 - 18)  SpO2: 95% (13 Dec 2019 11:26) (94% - 96%)  I&O's Summary    12 Dec 2019 07:01  -  13 Dec 2019 07:00  --------------------------------------------------------  IN: 1360 mL / OUT: 0 mL / NET: 1360 mL    13 Dec 2019 07:01  -  13 Dec 2019 12:17  --------------------------------------------------------  IN: 200 mL / OUT: 0 mL / NET: 200 mL      MEDICATIONS  (STANDING):  benzocaine 15 mG/menthol 3.6 mG Lozenge 1 Lozenge Oral two times a day  enoxaparin Injectable 40 milliGRAM(s) SubCutaneous daily  guaiFENesin ER 1200 milliGRAM(s) Oral every 12 hours  influenza   Vaccine 0.5 milliLiter(s) IntraMuscular once  lactobacillus acidophilus 1 Tablet(s) Oral daily  lidocaine   Patch 1 Patch Transdermal daily  pantoprazole    Tablet 40 milliGRAM(s) Oral two times a day  pregabalin 150 milliGRAM(s) Oral every 8 hours  sodium chloride 0.65% Nasal 1 Spray(s) Both Nostrils every 6 hours  triamcinolone 0.1% Cream 1 Application(s) Topical three times a day    MEDICATIONS  (PRN):  hydrocodone/homatropine Syrup 5 milliLiter(s) Oral every 6 hours PRN Cough  oxycodone    5 mG/acetaminophen 325 mG 2 Tablet(s) Oral every 6 hours PRN Moderate Pain (4 - 6)    LABS:              CAPILLARY BLOOD GLUCOSE              REVIEW OF SYSTEMS:  CONSTITUTIONAL: No fever, weight loss, or fatigue  EYES: No eye pain, visual disturbances, or discharge  ENMT:  No difficulty hearing, tinnitus, vertigo; No sinus or throat pain  NECK: No pain or stiffness  RESPIRATORY: No cough, wheezing, chills or hemoptysis; No shortness of breath  CARDIOVASCULAR: No chest pain, palpitations, dizziness, or leg swelling  GASTROINTESTINAL: No abdominal or epigastric pain. No nausea, vomiting, or hematemesis; No diarrhea or constipation. No melena or hematochezia.  GENITOURINARY: No dysuria, frequency, hematuria, or incontinence  NEUROLOGICAL: No headaches, memory loss, loss of strength, numbness, or tremors      Consultant(s) Notes Reviewed:  [x ] YES  [ ] NO    PHYSICAL EXAM:  GENERAL: NAD, well-groomed, well-developed,not in any distress ,  HEAD:  Atraumatic, Normocephalic  EYES: EOMI, PERRLA, conjunctiva and sclera clear  ENMT: No tonsillar erythema, exudates, or enlargement; Moist mucous membranes, Good dentition, No lesions  NECK: Supple, No JVD, Normal thyroid  NERVOUS SYSTEM:  Alert & Oriented X3, No focal deficit   CHEST/LUNG: Good air entry bilateral with no  rales, rhonchi, wheezing, or rubs  HEART: Regular rate and rhythm; No murmurs, rubs, or gallops  ABDOMEN: Soft, Nontender, Nondistended; Bowel sounds present  EXTREMITIES:  2+ Peripheral Pulses, No clubbing, cyanosis, or edema    Care Discussed with Consultants/Other Providers [ x] YES  [ ] NO

## 2019-12-13 NOTE — PROGRESS NOTE ADULT - ASSESSMENT
42yo F pmhx of fibromyalgia, anemia, gastric bypass in 2006 at [place], she was admitted to [place] surgery in 2015 with epigastric pain, found to have ulcer at the gastric staple line, managed with PPI, then discharged, presents here with one day of BRBPR. No history of constipation, she had hemorrhoids when she was pregnant but resolved, no surgeries for hemorrhoids, no chronic anal pain. She reports that after the BRBPR yesterday 1x episode, later in day she had a vaginal bleeding (Believes this is period, right time and has been "bleeding heavily with periods for 4 months". Feeling weaker more sob with bleeding. Does not take motrin, does not smoke.      Problem/Plan - 1:  ·  Problem: GIB (gastrointestinal bleeding).  Plan: Hemodynamically stable. GI and Colorectal help appreciated. S/P EGD and Colonoscopy. HH stable.     Problem/Plan - 2:  ·  Problem: Anemia due to acute blood loss.  Plan: Transfusing to keep Hgb>9 G . IV iron and cbc stable.      Problem/Plan - 3:  ·  Problem:  Fever and chills .  Plan: RVP ,Chest Xray noted  and cultures noted.  No Leucocytosis . CT chest noted.     < from: CT Chest No Cont (11.28.19 @ 16:27) >  IMPRESSION:     Clear lungs.    3.5 x 2.5 cm incompletely imaged subcutaneous fluid collection with   fistulous tract to the skin surface within the mid lower back at the   level of L1 vertebral body. Recommend correlation with physical exam.    These findings were discussed NP Xavier by Dr. Boykin on 11/29/19 10:22   AM.    < end of copied text >  ID and Nsux Surgery helping.      Problem/Plan - 4:  ·  Problem: Back pain with radiculopathy with Fluid Collection .  Plan: H/O Surgeries and will f/u with her spinal surgeon.   Nsx consult noted. MRI < from: MR Lumbar Spine w/wo IV Cont (11.29.19 @ 23:49) >  Impression: There is a large fluid collection with the measurements given   above in the subcutaneous fat. The patient has undergone a laminectomy   between the prior CT of 8/28/2018 and the current evaluation at the L5-S1   level. That operation and associated imaging was not done in this   hospital. The collection described does not communicate or extend into   the paraspinal musculature or into the region of the spinal canal. It   does enhance around its rim. The fat on the T1-weighted sequences around   the region appears pristine. This collection may likely be a benign fluid   collection however, possibility of an infected collection is not entirely   excluded. Clinical correlation is necessary.    < end of copied text >  and Aspiration by IR today of 50 cc clear yellow fluid .  ID Dcd Abxs as  final c/s noted.  MRI LS spine decrease in size of collection and doppler legs negative . No fever or Leucocytosis .      Problem/Plan - 5:  ·  Problem: Chronic Cough . .  Plan: CT chest noted. Pulmonary and ENT help appreciated. Pt already on PPI .CT sinuses noted.      Problem/Plan - 6:  Problem: Obesity. Plan: H/O Gastric Bypass and Bariatric surgery follow up noted.     Problem/Plan - 7:  ·  Problem: Fibromyalgia.  Plan: Home meds.       Disposition : DC planning home  today if cleared by Nsx.

## 2019-12-13 NOTE — PROGRESS NOTE ADULT - SUBJECTIVE AND OBJECTIVE BOX
Follow-up Pulm Progress Note    No new respiratory events overnight.  Denies SOB/CP.   Cough about the same as yesterday   98% on RA    Medications:  MEDICATIONS  (STANDING):  benzocaine 15 mG/menthol 3.6 mG Lozenge 1 Lozenge Oral two times a day  enoxaparin Injectable 40 milliGRAM(s) SubCutaneous daily  guaiFENesin ER 1200 milliGRAM(s) Oral every 12 hours  influenza   Vaccine 0.5 milliLiter(s) IntraMuscular once  lactobacillus acidophilus 1 Tablet(s) Oral daily  lidocaine   Patch 1 Patch Transdermal daily  pantoprazole    Tablet 40 milliGRAM(s) Oral two times a day  pregabalin 150 milliGRAM(s) Oral every 8 hours  sodium chloride 0.65% Nasal 1 Spray(s) Both Nostrils every 6 hours  triamcinolone 0.1% Cream 1 Application(s) Topical three times a day    MEDICATIONS  (PRN):  hydrocodone/homatropine Syrup 5 milliLiter(s) Oral every 6 hours PRN Cough  oxycodone    5 mG/acetaminophen 325 mG 2 Tablet(s) Oral every 6 hours PRN Moderate Pain (4 - 6)          Vital Signs Last 24 Hrs  T(C): 36.6 (13 Dec 2019 04:50), Max: 36.8 (12 Dec 2019 21:19)  T(F): 97.9 (13 Dec 2019 04:50), Max: 98.2 (12 Dec 2019 21:19)  HR: 70 (13 Dec 2019 04:50) (70 - 84)  BP: 98/64 (13 Dec 2019 04:50) (98/64 - 101/65)  BP(mean): --  RR: 18 (13 Dec 2019 04:50) (18 - 18)  SpO2: 94% (13 Dec 2019 04:50) (94% - 96%) on RA          12-12 @ 07:01  -  12-13 @ 07:00  --------------------------------------------------------  IN: 1360 mL / OUT: 0 mL / NET: 1360 mL          LABS:                CAPILLARY BLOOD GLUCOSE                        Fluid characteristics  -- 12-04 @ 17:03  pH --  LDH --  tprot 3.8    Cell count  Appearance Slightly Cloudy  Fluid type --  BF lymph 88  color Yellow  eosinophil --  PMN 0  Mesothelial --  Monocyte 12  Other body cells --      CULTURES: (if applicable)          Physical Examination:  PULM: Clear to auscultation bilaterally, no significant sputum production  CVS: S1, S2 heard    RADIOLOGY REVIEWED  CT chest: < from: CT Chest No Cont (11.28.19 @ 16:27) >  FINDINGS:    LUNGS AND AIRWAYS: The lungs are clear. Patent central airways.     PLEURA: No pleural effusion.    MEDIASTINUM AND EVELIA: No lymphadenopathy.    VESSELS: Within normal limits.    HEART: Heart size is normal. No pericardial effusion. Minimal LAD   calcific atherosclerosis.    CHEST WALL AND LOWER NECK: Bilateral breast implants.     VISUALIZED UPPER ABDOMEN: Status post gastric bypass. Punctate   nonobstructing left renal stone(series 2 image 160). 3.5 x 2.5 cm   incompletely imaged subcutaneous fluid collection with fistulous tract to   the skin surface within the mid lower back at the level of L1 vertebral   body. Recommend correlation with physical exam.    BONES: Within normal limits.    IMPRESSION:     Clear lungs.    3.5 x 2.5 cm incompletely imaged subcutaneous fluid collection with   fistulous tract to the skin surface within the mid lower back at the   level of L1 vertebral body. Recommend correlation with physical exam.    < end of copied text >

## 2019-12-13 NOTE — PROGRESS NOTE ADULT - PROBLEM SELECTOR PLAN 2
L maxillary molar periapical lucency extending into L maxillary sinus floor  -Likely the source of patient's unilateral maxillary sinus pain  -Please have dental team comment on the need for abx and duration  -Outpt dental f/u for tooth extraction  -d/w NP
L maxillary molar periapical lucency extending into L maxillary sinus floor  -Likely the source of patient's unilateral maxillary sinus pain  -d/w dental yesterday, patient does not need abx from their standpoint   -Outpt dental f/u for tooth extraction
L maxillary molar periapical lucency extending into L maxillary sinus floor  -Likely the source of patient's unilateral maxillary sinus pain  -d/w dental, patient does not need abx from their standpoint   -Outpt dental f/u for tooth extraction
L maxillary molar periapical lucency extending into L maxillary sinus floor  -Likely the source of patient's unilateral maxillary sinus pain  -d/w dental, patient does not need abx from their standpoint   -Outpt dental f/u for tooth extraction
L maxillary molar periapical lucency extending into L maxillary sinus floor  -Possibly the source of patient's unilateral maxillary sinus pain  -Consider dental consult for further planning-does she require abx?
L maxillary molar periapical lucency extending into L maxillary sinus floor  -Possibly the source of patient's unilateral maxillary sinus pain  -Dental f/u
L maxillary molar periapical lucency extending into L maxillary sinus floor  -Likely the source of patient's unilateral maxillary sinus pain  -Await call back from dental resident re. length of need for Augmentin   -Outpt dental f/u for tooth extraction

## 2019-12-13 NOTE — PROGRESS NOTE ADULT - PROBLEM SELECTOR PROBLEM 2
R/O Dental abscess

## 2019-12-13 NOTE — DISCHARGE NOTE PROVIDER - NSDCMRMEDTOKEN_GEN_ALL_CORE_FT
cyclobenzaprine 5 mg oral tablet: 1 tab(s) orally 3 times a day, As needed, Muscle Spasm  ibuprofen 200 mg oral tablet: 1 tab(s) orally once a day, As Needed  lidocaine 5% topical film: Apply topically to affected area once a day, As Needed  nyquil: as needed  Percocet 10/325 oral tablet: 1 tab(s) orally 2 times a day, As Needed  pregabalin 150 mg oral capsule: 1 cap(s) orally every 8 hours MDD:3 Acidophilus oral capsule: 1 cap(s) orally once a day   hydrocodone-homatropine 5 mg-1.5 mg/5 mL oral syrup: 5 milliliter(s) orally every 6 hours, As needed, Cough MDD:20 ml  lidocaine 5% topical film: Apply topically to affected area once a day, As Needed  oxycodone-acetaminophen 5 mg-325 mg oral tablet: 2 tab(s) orally every 6 hours, As needed, Moderate Pain (4 - 6) MDD:8 tab  pantoprazole 40 mg oral delayed release tablet: 1 tab(s) orally 2 times a day  pregabalin 150 mg oral capsule: 1 cap(s) orally every 8 hours MDD:3  sodium chloride nasal spray: 1 spray(s) nasal 2 times a day   triamcinolone 0.1% topical cream: 1 application topically 3 times a day

## 2019-12-13 NOTE — DISCHARGE NOTE PROVIDER - NSDCCPCAREPLAN_GEN_ALL_CORE_FT
PRINCIPAL DISCHARGE DIAGNOSIS  Diagnosis: GIB (gastrointestinal bleeding)  Assessment and Plan of Treatment: Gastric non bleeding ulcer- continue meds. avoid NSAIDS. Follow up with pcp in a few days. Hemoglobin is stable   return to the hospital if worsens      SECONDARY DISCHARGE DIAGNOSES  Diagnosis: Dental cavity  Assessment and Plan of Treatment: left molar cavity- dental eval    Diagnosis: Cough  Assessment and Plan of Treatment: Cough and sinusitis- continue meds. Outpatient pulm functions test. continue meds    Diagnosis: Back pain with radiculopathy  Assessment and Plan of Treatment: Back pain with radiculopathy L1 fluid collection is less. follow up with Dr Fuller    Diagnosis: Obesity  Assessment and Plan of Treatment: Obesity- follow up with gastric bypass physician    Diagnosis: GERD (gastroesophageal reflux disease)  Assessment and Plan of Treatment: GERD (gastroesophageal reflux disease)- elevate head of bed. anti reflux measures. take meds as prescribed    Diagnosis: Anemia due to blood loss  Assessment and Plan of Treatment: Anemia due to blood loss- stable. you were given IV iron. follow up with your physician in a few days

## 2019-12-13 NOTE — DISCHARGE NOTE PROVIDER - HOSPITAL COURSE
42yo F pmhx of fibromyalgia, anemia, gastric bypass in 2006 at [facility], she was admitted to [facility] in 2015 with epigastric pain, found to have ulcer at the gastric staple line, managed with PPI, then discharged, presents here with one day of BRBPR. No history of constipation, she had hemorrhoids when she was pregnant but resolved, no surgeries for hemorrhoids, no chronic anal pain. She reports that after the BRBPR yesterday 1x episode, later in day she had a vaginal bleeding (Believes this is period, right time and has been "bleeding heavily with periods for 4 months". Feeling weaker more sob with bleeding. Does not take motrin, does not smoke.          Problem/Plan - 1:    ·  Problem: GIB (gastrointestinal bleeding).  Plan: Hemodynamically stable. GI and Colorectal help appreciated. S/P EGD and Colonoscopy. Gastric non bleeding ulcer. HH stable.         Problem/Plan - 2:    ·  Problem: Anemia due to acute blood loss.  Plan: Transfusing to keep Hgb>9 G . IV iron and cbc stable.          Problem/Plan - 3:    ·  Problem:  Fever and chills .  Plan: RVP ,Chest Xray noted  and cultures noted.  No Leucocytosis . CT chest noted.       < from: CT Chest No Cont (11.28.19 @ 16:27) >    IMPRESSION:         Clear lungs.        3.5 x 2.5 cm incompletely imaged subcutaneous fluid collection with     fistulous tract to the skin surface within the mid lower back at the     level of L1 vertebral body. Recommend correlation with physical exam.        These findings were discussed NP Xavier by Dr. Boykin on 11/29/19 10:22     AM.        < end of copied text >    ID and Nsux Surgery helping. Neurosurgery said to follow up outpatient with Dr [facility]          Problem/Plan - 4:    ·  Problem: Back pain with radiculopathy with Fluid Collection .  Plan: H/O Surgeries and will f/u with her spinal surgeon.     Nsx consult noted as above . MRI < from: MR Lumbar Spine w/wo IV Cont (11.29.19 @ 23:49) >    Impression: There is a large fluid collection with the measurements given     above in the subcutaneous fat. The patient has undergone a laminectomy     between the prior CT of 8/28/2018 and the current evaluation at the L5-S1     level. That operation and associated imaging was not done in this     hospital. The collection described does not communicate or extend into     the paraspinal musculature or into the region of the spinal canal. It     does enhance around its rim. The fat on the T1-weighted sequences around     the region appears pristine. This collection may likely be a benign fluid     collection however, possibility of an infected collection is not entirely     excluded. Clinical correlation is necessary.        < end of copied text >    and Aspiration by IR today of 50 cc clear yellow fluid .  ID Dcd Abxs as  final c/s noted.  MRI LS spine decrease in size of collection and doppler legs negative . No fever or Leucocytosis .          Problem/Plan - 5:    ·  Problem: Chronic Cough . .  Plan: CT chest noted. Pulmonary and ENT help appreciated. Pt already on PPI .CT sinuses noted.          Problem/Plan - 6:    Problem: Obesity. Plan: H/O Gastric Bypass and Bariatric surgery follow up noted.         Problem/Plan - 7:    ·  Problem: Fibromyalgia.  Plan: Home meds.             Disposition : DC home today per Dr Bustos.

## 2019-12-13 NOTE — DISCHARGE NOTE PROVIDER - CARE PROVIDER_API CALL
Dr alvarado [facility] [place],   Phone: (   )    -  Fax: (   )    -  Follow Up Time:     Flavia Joshi  Phone: (   )    -  Fax: (   )    -  Follow Up Time:     GYN,   Phone: (   )    -  Fax: (   )    -  Follow Up Time:     Dentist,   Phone: (   )    -  Fax: (   )    -  Follow Up Time:     Vladimir Hatch (DO)  Critical Care Medicine; Internal Medicine; Pulmonary Disease  [address] [facility], [address]  [place] [address]  Phone: [phone]  Fax: [phone]  Follow Up Time:

## 2019-12-13 NOTE — PROGRESS NOTE ADULT - REASON FOR ADMISSION
bleeding per rectum

## 2019-12-18 LAB
CULTURE RESULTS: SIGNIFICANT CHANGE UP
SPECIMEN SOURCE: SIGNIFICANT CHANGE UP

## 2020-01-04 LAB
CULTURE RESULTS: SIGNIFICANT CHANGE UP
SPECIMEN SOURCE: SIGNIFICANT CHANGE UP

## 2020-02-28 NOTE — PATIENT PROFILE ADULT - DO YOU FEEL LIKE HURTING YOURSELF OR OTHERS?
Progress Note - Chun Carrillo 1956, 59 y o  female MRN: [id number]    [facility] Encounter: [id number]    Primary Care Provider: Catie Feliz PA-C   Date and time admitted to hospital: 7/23/2019  5:30 PM        * Schizoaffective disorder, bipolar type [facility])  Assessment & Plan  Patient is excited that she was given intake interview by the [facility] and she has no objection to have them be the payee  for her social security benefits  She is still accusatory about certain staff and is somewhat reluctant in attending to her ADLs skills but taking showers as required but has been doing so like twice a week with staff perseveration and reminders  She does make it a point to attend groups and has been compliant with medications but seems to harbor psychosomatic symptoms about dying from choking and not being able to breathe and not being able to swallow etc   Her hygiene and grooming is still not that great  She is friendly and pleasant today but continues to talk in a stilted manner as if court talking in a court of law  Compliant with medications and no side effects reported  Appetite is fair and sleeping well  She denies any worsening of her depressive symptoms but still feels frustrated as she is here too long    He is looking forward to going out to the community on another pass with staff next week following which she expects to go out on a pass with her sister to the community and she is willing to wait it out until a bed opens up at the [facility]    Current medications:    Current Facility-Administered Medications:     acetaminophen (TYLENOL) tablet 325 mg, 325 mg, Oral, Q6H PRN, Ervin Little MD    acetaminophen (TYLENOL) tablet 650 mg, 650 mg, Oral, Q6H PRN, Ervin Little MD    acetaminophen (TYLENOL) tablet 650 mg, 650 mg, Oral, Q8H PRN, Ervin Little MD    albuterol (PROVENTIL HFA,VENTOLIN HFA) inhaler 2 puff, 2 puff, Inhalation, Q4H PRN, Dameon Jurado Chente Kaye MD, 2 puff at 10/11/19 0424    aluminum-magnesium hydroxide-simethicone (MYLANTA) 200-200-20 mg/5 mL oral suspension 15 mL, 15 mL, Oral, Q4H PRN, Krystyna Mcclain MD, 15 mL at 01/26/20 1021    ammonium lactate (LAC-HYDRIN) 12 % lotion 1 application, 1 application, Topical, BID PRN, Krystyna Mcclain MD    benzonatate (TESSALON PERLES) capsule 100 mg, 100 mg, Oral, TID PRN, Krystyna Mcclain MD    benztropine (COGENTIN) injection 1 mg, 1 mg, Intramuscular, Q8H PRN, Krystyna Mcclain MD    carbamide peroxide (DEBROX) 6 5 % otic solution 5 drop, 5 drop, Left Ear, BID PRN, Amy Powell MD    cloZAPine (CLOZARIL) tablet 25 mg, 25 mg, Oral, BID, Krystyna Mcclain MD, 25 mg at 02/27/20 2054    cloZAPine (CLOZARIL) tablet 50 mg, 50 mg, Oral, BID, Krystyna Mcclain MD, 50 mg at 02/27/20 2054    docusate sodium (COLACE) capsule 100 mg, 100 mg, Oral, BID PRN, Krystyna Mcclain MD    EPINEPHrine PF (ADRENALIN) 1 mg/mL injection 0 15 mg, 0 15 mg, Intramuscular, Once PRN, Krystyna Mcclain MD    fluticasone-vilanterol (BREO ELLIPTA) 200-25 MCG/INH inhaler 1 puff, 1 puff, Inhalation, Daily, Krystyna Mcclain MD, 1 puff at 02/28/20 0836    ketotifen (ZADITOR) 0 025 % ophthalmic solution 1 drop, 1 drop, Right Eye, BID PRN, Krystyna Mcclain MD    levothyroxine tablet 125 mcg, 125 mcg, Oral, Early Morning, Krystyna Mcclain MD, 125 mcg at 02/28/20 0602    magnesium hydroxide (MILK OF MAGNESIA) 400 mg/5 mL oral suspension 30 mL, 30 mL, Oral, Daily PRN, Krystyna Mcclani MD    montelukast (SINGULAIR) tablet 10 mg, 10 mg, Oral, HS, Krystyna Mcclain MD, 10 mg at 02/22/20 2104    OLANZapine (ZyPREXA) IM injection 5 mg, 5 mg, Intramuscular, Q8H PRN, Krystyna Mcclain MD    OLANZapine (ZyPREXA) tablet 5 mg, 5 mg, Oral, Q8H PRN, Krystyna Mcclain MD    ondansetron (ZOFRAN-ODT) dispersible tablet 4 mg, 4 mg, Oral, Q6H PRN, Krystyna Mcclain MD, 4 mg at 12/09/19 1757    pantoprazole (PROTONIX) EC tablet 40 mg, 40 mg, Oral, Early Morning, Krystyna Mcclain MD, 40 mg at 02/28/20 0602    polyethylene glycol (MIRALAX) packet 17 g, 17 g, Oral, Daily PRN, Amina Aparicio MD    polyvinyl alcohol (LIQUIFILM TEARS) 1 4 % ophthalmic solution 1 drop, 1 drop, Both Eyes, Q3H PRN, Amina Aparicio MD    sertraline (ZOLOFT) tablet 175 mg, 175 mg, Oral, Daily, Amina Aparicio MD, 175 mg at 02/28/20 3695    sucralfate (CARAFATE) oral suspension 1,000 mg, 1,000 mg, Oral, BID, Florence Rendon MD, 1,000 mg at 02/28/20 0602    theophylline (JEF-24) 24 hr capsule 200 mg, 200 mg, Oral, Daily, Amina Aparicio MD, 200 mg at 02/28/20 0836    tiotropium [facility]) capsule for inhaler 18 mcg, 18 mcg, Inhalation, Daily, Amina Aparicio MD, 18 mcg at 02/28/20 0836    traZODone (DESYREL) tablet 25 mg, 25 mg, Oral, HS PRN, Amina Aparicio MD  Justification if on more than two antipsychotics: not applicable  Side effects if any: none    Risks , benefits, side effects and precautions of medications discussed with patient and reminded patient to let us know any problems with medications     Objective:     Vital Signs:  Vitals:    02/27/20 2015 02/27/20 2137 02/28/20 0730 02/28/20 0732   BP: 116/70  117/56 112/68   BP Location: Left arm  Left arm Left arm   Pulse: 87  80 84   Resp: 18  17    Temp: 98 °F (36 7 °C)  (!) 97 3 °F (36 3 °C)    TempSrc: Temporal  Temporal    SpO2: 94% 92%     Weight:       Height:         Appearance:  age appropriate and older than stated age somewhat disheveled poorly groomed preoccupied with good eye contact   Behavior:  deviant, evasive and guarded became is suspicious argumentative   Speech:  delayed, increased latency of response and tangential    Mood:  anxious, euphoric and irritable    Affect:  increased in intensity, increased in range, mood-congruent and redirectable   Thought Process:  circumstantial, concrete, goal directed, illogical and perserverative with tendency to become stilted   Thought Content:  delusions  grandiose and persecutory no current suicidal homicidal thoughts and under plans    No preoccupation with violence  Still with psychosomatic symptoms as before  No phobias obsessions or compulsions  Still somewhat paranoid about certain staff   Perceptual Disturbances: None    Risk Potential: Inability to care for herself and refusal to take showers regularly   Sensorium:  person, place, time/date, situation, day of week, month of year, year and time   Cognition:  grossly intact and language deficit, no deficit in recent or remote memory, aware of current events   Consciousness:  alert and awake    Attention: Fair   Intellect: Average   Insight:  Limited   Judgment: fair       Motor Activity: no abnormal movements         Recent Labs:  Results Reviewed     None          I/O Past 24 hours:  No intake/output data recorded  No intake/output data recorded  Assessment / Plan:     Schizoaffective disorder, [facility] )  Overall status:  improving    Reason for continued inpatient care: to assess ability to maintain improvement by attending to ADLs and taking showers regular and see how she does on outing with family after another outing with staff escort next week  Acceptance by patient: accepting now  Hopefulness in recovery: living at the [facility]  Understanding of medications :  yes  Involved in reintegration process: attending groups and has off grounds and off unit privileges   Trusting in relatoinship with psychiatrist:  Georgia Ruelas now  Overall status :  No changes  Recommended Treatment:    Medication changes:  1) none today    Non-pharmacological treatments  1) Continue with individual therapy, group therapy, milieu therapy and occupational therapy using recovery principles and psycho-education about accepting illness and need for treatment     2) encourage to take showers twice a week and cooperate with treatment and adl skills as per her behav  plan  3) another pass with staff to community today again before pass with sister to see if she would fully comply with assigned outing before pass to community with sister  4) Prepare for interview for intake with [facility]   Safety  1) Safety/communication plan established targeting dynamic risk factors above  Discharge Plan to a [facility] at [address] / Coordination of Care    Total floor / unit time spent today 15 minutes  Greater than 50% of total time was spent with the patient and / or family counseling and / or coordination of care  Receptive to listening and learning coping skills for management of symptoms and attending to ADLs  Patient's Rights, confidentiality and exceptions to confidentiality, use of automated medical record, [address] [place] [address] staff access to medical record, and consent to treatment reviewed      Krystyna Mcclain MD no

## 2020-05-26 ENCOUNTER — EMERGENCY (EMERGENCY)
Facility: HOSPITAL | Age: 45
LOS: 1 days | Discharge: HOME | End: 2020-05-26
Attending: EMERGENCY MEDICINE
Payer: COMMERCIAL

## 2020-05-26 VITALS
RESPIRATION RATE: 20 BRPM | WEIGHT: 199.96 LBS | HEIGHT: 61 IN | SYSTOLIC BLOOD PRESSURE: 117 MMHG | DIASTOLIC BLOOD PRESSURE: 76 MMHG | HEART RATE: 100 BPM | OXYGEN SATURATION: 100 % | TEMPERATURE: 99 F

## 2020-05-26 VITALS
OXYGEN SATURATION: 98 % | HEART RATE: 85 BPM | RESPIRATION RATE: 17 BRPM | SYSTOLIC BLOOD PRESSURE: 118 MMHG | DIASTOLIC BLOOD PRESSURE: 63 MMHG

## 2020-05-26 DIAGNOSIS — Z98.89 OTHER SPECIFIED POSTPROCEDURAL STATES: Chronic | ICD-10-CM

## 2020-05-26 LAB
ALBUMIN SERPL ELPH-MCNC: 5 G/DL — SIGNIFICANT CHANGE UP (ref 3.3–5)
ALP SERPL-CCNC: 117 U/L — SIGNIFICANT CHANGE UP (ref 40–120)
ALT FLD-CCNC: 18 U/L — SIGNIFICANT CHANGE UP (ref 10–45)
ANION GAP SERPL CALC-SCNC: 21 MMOL/L — HIGH (ref 5–17)
APAP SERPL-MCNC: <15 UG/ML — SIGNIFICANT CHANGE UP (ref 10–30)
APPEARANCE UR: CLEAR — SIGNIFICANT CHANGE UP
AST SERPL-CCNC: 28 U/L — SIGNIFICANT CHANGE UP (ref 10–40)
BACTERIA # UR AUTO: ABNORMAL
BASOPHILS # BLD AUTO: 0.08 K/UL — SIGNIFICANT CHANGE UP (ref 0–0.2)
BASOPHILS NFR BLD AUTO: 0.9 % — SIGNIFICANT CHANGE UP (ref 0–2)
BILIRUB SERPL-MCNC: 0.3 MG/DL — SIGNIFICANT CHANGE UP (ref 0.2–1.2)
BILIRUB UR-MCNC: NEGATIVE — SIGNIFICANT CHANGE UP
BUN SERPL-MCNC: 9 MG/DL — SIGNIFICANT CHANGE UP (ref 7–23)
CALCIUM SERPL-MCNC: 9.8 MG/DL — SIGNIFICANT CHANGE UP (ref 8.4–10.5)
CHLORIDE SERPL-SCNC: 101 MMOL/L — SIGNIFICANT CHANGE UP (ref 96–108)
CO2 SERPL-SCNC: 19 MMOL/L — LOW (ref 22–31)
COLOR SPEC: SIGNIFICANT CHANGE UP
CREAT SERPL-MCNC: 0.59 MG/DL — SIGNIFICANT CHANGE UP (ref 0.5–1.3)
DIFF PNL FLD: ABNORMAL
EOSINOPHIL # BLD AUTO: 0.13 K/UL — SIGNIFICANT CHANGE UP (ref 0–0.5)
EOSINOPHIL NFR BLD AUTO: 1.4 % — SIGNIFICANT CHANGE UP (ref 0–6)
EPI CELLS # UR: 3 /HPF — SIGNIFICANT CHANGE UP
GAS PNL BLDV: SIGNIFICANT CHANGE UP
GAS PNL BLDV: SIGNIFICANT CHANGE UP
GLUCOSE SERPL-MCNC: 132 MG/DL — HIGH (ref 70–99)
GLUCOSE UR QL: NEGATIVE — SIGNIFICANT CHANGE UP
HCG SERPL-ACNC: <2 MIU/ML — SIGNIFICANT CHANGE UP
HCT VFR BLD CALC: 38.1 % — SIGNIFICANT CHANGE UP (ref 34.5–45)
HGB BLD-MCNC: 12.9 G/DL — SIGNIFICANT CHANGE UP (ref 11.5–15.5)
HYALINE CASTS # UR AUTO: 1 /LPF — SIGNIFICANT CHANGE UP (ref 0–2)
IMM GRANULOCYTES NFR BLD AUTO: 0.3 % — SIGNIFICANT CHANGE UP (ref 0–1.5)
KETONES UR-MCNC: SIGNIFICANT CHANGE UP
LEUKOCYTE ESTERASE UR-ACNC: NEGATIVE — SIGNIFICANT CHANGE UP
LYMPHOCYTES # BLD AUTO: 2.98 K/UL — SIGNIFICANT CHANGE UP (ref 1–3.3)
LYMPHOCYTES # BLD AUTO: 32 % — SIGNIFICANT CHANGE UP (ref 13–44)
MAGNESIUM SERPL-MCNC: 2.3 MG/DL — SIGNIFICANT CHANGE UP (ref 1.6–2.6)
MCHC RBC-ENTMCNC: 30.4 PG — SIGNIFICANT CHANGE UP (ref 27–34)
MCHC RBC-ENTMCNC: 33.9 GM/DL — SIGNIFICANT CHANGE UP (ref 32–36)
MCV RBC AUTO: 89.9 FL — SIGNIFICANT CHANGE UP (ref 80–100)
MONOCYTES # BLD AUTO: 0.35 K/UL — SIGNIFICANT CHANGE UP (ref 0–0.9)
MONOCYTES NFR BLD AUTO: 3.8 % — SIGNIFICANT CHANGE UP (ref 2–14)
NEUTROPHILS # BLD AUTO: 5.75 K/UL — SIGNIFICANT CHANGE UP (ref 1.8–7.4)
NEUTROPHILS NFR BLD AUTO: 61.6 % — SIGNIFICANT CHANGE UP (ref 43–77)
NITRITE UR-MCNC: NEGATIVE — SIGNIFICANT CHANGE UP
NRBC # BLD: 0 /100 WBCS — SIGNIFICANT CHANGE UP (ref 0–0)
PH UR: 6 — SIGNIFICANT CHANGE UP (ref 5–8)
PLATELET # BLD AUTO: 339 K/UL — SIGNIFICANT CHANGE UP (ref 150–400)
POTASSIUM SERPL-MCNC: 3.5 MMOL/L — SIGNIFICANT CHANGE UP (ref 3.5–5.3)
POTASSIUM SERPL-SCNC: 3.5 MMOL/L — SIGNIFICANT CHANGE UP (ref 3.5–5.3)
PROT SERPL-MCNC: 8.6 G/DL — HIGH (ref 6–8.3)
PROT UR-MCNC: ABNORMAL
RBC # BLD: 4.24 M/UL — SIGNIFICANT CHANGE UP (ref 3.8–5.2)
RBC # FLD: 14.6 % — HIGH (ref 10.3–14.5)
RBC CASTS # UR COMP ASSIST: 1 /HPF — SIGNIFICANT CHANGE UP (ref 0–4)
SALICYLATES SERPL-MCNC: <2 MG/DL — LOW (ref 15–30)
SODIUM SERPL-SCNC: 141 MMOL/L — SIGNIFICANT CHANGE UP (ref 135–145)
SP GR SPEC: 1.03 — HIGH (ref 1.01–1.02)
UROBILINOGEN FLD QL: NEGATIVE — SIGNIFICANT CHANGE UP
WBC # BLD: 9.32 K/UL — SIGNIFICANT CHANGE UP (ref 3.8–10.5)
WBC # FLD AUTO: 9.32 K/UL — SIGNIFICANT CHANGE UP (ref 3.8–10.5)
WBC UR QL: 2 /HPF — SIGNIFICANT CHANGE UP (ref 0–5)

## 2020-05-26 PROCEDURE — 85027 COMPLETE CBC AUTOMATED: CPT

## 2020-05-26 PROCEDURE — 93005 ELECTROCARDIOGRAM TRACING: CPT

## 2020-05-26 PROCEDURE — 83605 ASSAY OF LACTIC ACID: CPT

## 2020-05-26 PROCEDURE — 84702 CHORIONIC GONADOTROPIN TEST: CPT

## 2020-05-26 PROCEDURE — 96374 THER/PROPH/DIAG INJ IV PUSH: CPT

## 2020-05-26 PROCEDURE — 84295 ASSAY OF SERUM SODIUM: CPT

## 2020-05-26 PROCEDURE — 99284 EMERGENCY DEPT VISIT MOD MDM: CPT | Mod: 25

## 2020-05-26 PROCEDURE — 80307 DRUG TEST PRSMV CHEM ANLYZR: CPT

## 2020-05-26 PROCEDURE — 70450 CT HEAD/BRAIN W/O DYE: CPT

## 2020-05-26 PROCEDURE — 85014 HEMATOCRIT: CPT

## 2020-05-26 PROCEDURE — 82435 ASSAY OF BLOOD CHLORIDE: CPT

## 2020-05-26 PROCEDURE — 81001 URINALYSIS AUTO W/SCOPE: CPT

## 2020-05-26 PROCEDURE — 70450 CT HEAD/BRAIN W/O DYE: CPT | Mod: 26

## 2020-05-26 PROCEDURE — 84132 ASSAY OF SERUM POTASSIUM: CPT

## 2020-05-26 PROCEDURE — 80053 COMPREHEN METABOLIC PANEL: CPT

## 2020-05-26 PROCEDURE — 82330 ASSAY OF CALCIUM: CPT

## 2020-05-26 PROCEDURE — 99285 EMERGENCY DEPT VISIT HI MDM: CPT

## 2020-05-26 PROCEDURE — 82947 ASSAY GLUCOSE BLOOD QUANT: CPT

## 2020-05-26 PROCEDURE — 82803 BLOOD GASES ANY COMBINATION: CPT

## 2020-05-26 PROCEDURE — 83735 ASSAY OF MAGNESIUM: CPT

## 2020-05-26 RX ORDER — SODIUM CHLORIDE 9 MG/ML
1000 INJECTION INTRAMUSCULAR; INTRAVENOUS; SUBCUTANEOUS ONCE
Refills: 0 | Status: COMPLETED | OUTPATIENT
Start: 2020-05-26 | End: 2020-05-26

## 2020-05-26 RX ORDER — OXYCODONE HYDROCHLORIDE 5 MG/1
1 TABLET ORAL
Qty: 12 | Refills: 0
Start: 2020-05-26 | End: 2020-05-28

## 2020-05-26 RX ORDER — ONDANSETRON 8 MG/1
4 TABLET, FILM COATED ORAL ONCE
Refills: 0 | Status: COMPLETED | OUTPATIENT
Start: 2020-05-26 | End: 2020-05-26

## 2020-05-26 RX ORDER — OXYCODONE HYDROCHLORIDE 5 MG/1
5 TABLET ORAL ONCE
Refills: 0 | Status: DISCONTINUED | OUTPATIENT
Start: 2020-05-26 | End: 2020-05-26

## 2020-05-26 RX ORDER — OXYCODONE HYDROCHLORIDE 5 MG/1
10 TABLET ORAL ONCE
Refills: 0 | Status: DISCONTINUED | OUTPATIENT
Start: 2020-05-26 | End: 2020-05-26

## 2020-05-26 RX ADMIN — SODIUM CHLORIDE 1000 MILLILITER(S): 9 INJECTION INTRAMUSCULAR; INTRAVENOUS; SUBCUTANEOUS at 09:56

## 2020-05-26 RX ADMIN — OXYCODONE HYDROCHLORIDE 10 MILLIGRAM(S): 5 TABLET ORAL at 15:22

## 2020-05-26 RX ADMIN — ONDANSETRON 4 MILLIGRAM(S): 8 TABLET, FILM COATED ORAL at 11:56

## 2020-05-26 RX ADMIN — SODIUM CHLORIDE 1000 MILLILITER(S): 9 INJECTION INTRAMUSCULAR; INTRAVENOUS; SUBCUTANEOUS at 11:06

## 2020-05-26 RX ADMIN — OXYCODONE HYDROCHLORIDE 5 MILLIGRAM(S): 5 TABLET ORAL at 09:55

## 2020-05-26 RX ADMIN — Medication 150 MILLIGRAM(S): at 12:57

## 2020-05-26 RX ADMIN — OXYCODONE HYDROCHLORIDE 5 MILLIGRAM(S): 5 TABLET ORAL at 12:01

## 2020-05-26 NOTE — ED PROVIDER NOTE - NS ED ROS FT
GENERAL: No fever or chills, weight changes, nightsweats  EYES: no change in vision  HEENT: no dysplasia, odynophagia   CARDIAC: no chest pain, palpitation   PULMONARY: no productive cough or SOB  GI: no abdominal pain, no nausea or no vomiting, no diarrhea or constipation  : No changes in urination for pain/freq.   SKIN: no rashes, abnormal bruising or bleeding  NEURO: no headache, numbness/tingling, extremity weakness   MSK: Back pain

## 2020-05-26 NOTE — ED PROVIDER NOTE - NSFOLLOWUPINSTRUCTIONS_ED_ALL_ED_FT
Thank you for visiting our Emergency Department, it has been a pleasure taking part in your healthcare. Please follow up with your primary doctor within x48 hours.    Your discharge diagnosis is: confusion  Please take your pain medication and follow-up with your primary care doctor.     Return precautions to the Emergency Department include but are not limited to: unrelenting nausea, vomiting, fever, chills, chest pain, shortness of breath, dizziness, abdominal pain, worsening pain, syncope, blood in urine or stool, headache that doesn't resolve, numbness or tingling, loss of sensation, loss of motor function, or any other concerning symptoms.

## 2020-05-26 NOTE — ED ADULT NURSE REASSESSMENT NOTE - NS ED NURSE REASSESS COMMENT FT1
Pt assisted to bathroom, pt unable to provide urine specimen. Pt informed that sterile specimen is needed. Pt states she will try again.

## 2020-05-26 NOTE — ED PROVIDER NOTE - PHYSICAL EXAMINATION
General: morbidly obese   HENT: Atraumatic, EOMI, no conjunctivae injection, moist mucosa, no post. oropharynx erythema, exudates  Neck: normal ROM and trachea midline   Cardiovascular: tachycardiac, S1&2, no M or R, radial pulses equal and b/l  Respiratory: CTABL, no wheezes or crackles, no decreased breath sounds  Abdominal: soft and non-tender non distended, neg for guarding, no CVA tenderness   Back: tenderness to the neck and the lower back   Extremities: FROM, no edema of the legs/feet, DP/PT equal b/l  Skin: warm, well perfused  Neurologic: nonfocal, AAOx2 (not to time/year)   Psych: normal mood and affect

## 2020-05-26 NOTE — ED PROVIDER NOTE - PROGRESS NOTE DETAILS
Vladimir Figueroa, PGY 2: patient's pain improved and not longer confused. Patient is able to recall pharmacy  from memory and will d/c home with home pain medication and follow-up with pcp. patient is agreeable. Vladimir Figueroa, PGY 2: patient tolerated PO and ambulated in the ER. lactate trending down. no abdominal pain or n/v, soft abdomen. re-examined and dw pt sxs and plan of care. she thinks the pain is prob chronic and she needs oxy, we discussed her use of etoh and she is going to hold off. has appt on friday for mr and we discussed what change in symptoms to return for ER admit or inpt. she does not want to stay for pain control and feels more comfortable at home susi in context of covid 19. will rx for a few more days oxy/gabapentin till pt has mri. pain prob caused by noted fluid collection

## 2020-05-26 NOTE — ED PROVIDER NOTE - OBJECTIVE STATEMENT
44F, h.o fibromyalgia, anemia, gastric bypass, lumbar spinal fusion, BIBEMS after family reports amsx2 and persistent alcohol (vodka, last drink 4hrs ago) use for the last 2 days and urinating on herself. Family reports patient usually don't use alcohol but run out of pain meds (oxycodone and Lyrica). Family states patient doesn't know the time/year and unlike her when she is normally drunk. Pt reports neck and back pain typical of her normal pain. Denied fever, n/v, diarrhea, abdominal pain. Pt denied any SI or HI, and reports one shot of vodka yesterday.

## 2020-05-26 NOTE — ED ADULT NURSE NOTE - OBJECTIVE STATEMENT
44 year old female PMHX fibromyalgia, thyroidectomy, lumbar spine surgery, arrives to ED by EMS from home for AMS. EMS reports pt cousin called 911 due to pt not acting normal for 2 days. Upon arrival to ED pt is a&ox4, hesitant to answer today's month. pt smells of ETOH, states "I had one shot of vodka yesterday for pain" and denies chronic ETOH use. States takes percocet for chronic pain management but ran out. Respirations unlabored. Pt tachycardic upon arrival. abdomen soft non-tender, non-distended. no N/V/D, no fever/chills. denies urinary symptoms or changes in BM. skin is warm and dry. sensation is intact x 4 extremities. no sick contacts. IV 20 gauge placed right AC. labs drawn and sent.

## 2020-05-26 NOTE — ED ADULT NURSE NOTE - INTERVENTIONS DEFINITIONS
Stretcher in lowest position, wheels locked, appropriate side rails in place/Monitor gait and stability/[place] to call system/Non-slip footwear when patient is off stretcher/Provide visual cue, wrist band, yellow gown, etc./Monitor for mental status changes and reorient to person, place, and time/Instruct patient to call for assistance/Provide visual clues: red socks/Physically safe environment: no spills, clutter or unnecessary equipment

## 2020-05-26 NOTE — ED PROVIDER NOTE - ATTENDING CONTRIBUTION TO CARE
lbp. hx of fluid collection recently. No numbness / tingling / urinary incont or retention / bowel probs / ivdu / fevers.   scar. mild ttp to approximately L4. 5/5 ehl / sensory intact bl le.   no red flag other than past fluid collection prob still there. no s/s of this being infected. ams prob from intox. now resolved.

## 2020-05-26 NOTE — ED PROVIDER NOTE - PATIENT PORTAL LINK FT
You can access the FollowMyHealth Patient Portal offered by [facility] by registering at the following website: [email or web address]. By joining [employer]’s FollowMyHealth portal, you will also be able to view your health information using other applications (apps) compatible with our system. You can access the FollowMyHealth Patient Portal offered by [facility] by registering at the following website: [email or web address]. By joining [employer]’s FollowMyHealth portal, you will also be able to view your health information using other applications (apps) compatible with our system.

## 2020-05-26 NOTE — ED PROVIDER NOTE - CLINICAL SUMMARY MEDICAL DECISION MAKING FREE TEXT BOX
44F, h.o spinal fusion, gastric bypass, p.w amsx2 and neck and back pain after 2 days of etoh use. no fever, cough, abdominal pain, n/v. nonfocal on neuro exam, tachycardiac possible due to pain and anxiety. concerning for electrolyte abn, alcohol ketoacidosis, vs ich or brain mass but low suspicion. back pain is typical per patient, will reassess. low suspicion for MARIBETH or spinal fx or impingement. will get labs, UA, urine culture, ct head, urine tox. low suspicion for infection at this time. dispo pending labs and advance imaging.

## 2021-11-15 NOTE — H&P ADULT - PROBLEM SELECTOR PLAN 2
How Severe Is It?: moderate Is This A New Presentation, Or A Follow-Up?: Rash Additional History: Patient recently cut her hair but reports dandruff and itching Transfusing to keep Hgb>9 G .

## 2022-04-28 NOTE — ED PROVIDER NOTE - SEPSIS ALERT QUESTION 1
yes
This patient was evaluated for sepsis.  At this time, a diagnosis of sepsis is not supported by the overall clinical picture.

## 2023-07-20 NOTE — ED ADULT NURSE NOTE - CHIEF COMPLAINT QUOTE
cough, back pain, vaginal bleed x 2 days, bright red blood per rectum x since yesterday (hx gastric bypass and gastric ulcer), chest pain x today, sob, chills, lower back pain, +hx anemia
not applicable (Male)

## 2023-10-10 NOTE — DISCHARGE NOTE PROVIDER - NSDCHHASSISTDEVIC_GEN_ALL_CORE_FT
Addended by: CANDIE JUSTICE on: 10/10/2023 04:32 PM     Modules accepted: Orders    
gait instability
